# Patient Record
Sex: FEMALE | Race: BLACK OR AFRICAN AMERICAN | Employment: OTHER | URBAN - METROPOLITAN AREA
[De-identification: names, ages, dates, MRNs, and addresses within clinical notes are randomized per-mention and may not be internally consistent; named-entity substitution may affect disease eponyms.]

---

## 2017-07-18 ENCOUNTER — OFFICE VISIT (OUTPATIENT)
Dept: INTERNAL MEDICINE CLINIC | Age: 67
End: 2017-07-18

## 2017-07-18 VITALS
HEART RATE: 67 BPM | DIASTOLIC BLOOD PRESSURE: 92 MMHG | SYSTOLIC BLOOD PRESSURE: 163 MMHG | HEIGHT: 63 IN | BODY MASS INDEX: 20.94 KG/M2 | RESPIRATION RATE: 20 BRPM | OXYGEN SATURATION: 94 % | TEMPERATURE: 97.9 F | WEIGHT: 118.2 LBS

## 2017-07-18 DIAGNOSIS — Z12.11 ENCOUNTER FOR SCREENING COLONOSCOPY: ICD-10-CM

## 2017-07-18 DIAGNOSIS — E78.5 DYSLIPIDEMIA: ICD-10-CM

## 2017-07-18 DIAGNOSIS — R06.09 DYSPNEA ON EXERTION: Primary | ICD-10-CM

## 2017-07-18 DIAGNOSIS — I10 WHITE COAT SYNDROME WITH HYPERTENSION: ICD-10-CM

## 2017-07-18 DIAGNOSIS — Z12.31 ENCOUNTER FOR SCREENING MAMMOGRAM FOR HIGH-RISK PATIENT: ICD-10-CM

## 2017-07-18 DIAGNOSIS — Z00.00 WELLNESS EXAMINATION: ICD-10-CM

## 2017-07-18 NOTE — MR AVS SNAPSHOT
Visit Information Date & Time Provider Department Dept. Phone Encounter #  
 7/18/2017 10:30 AM Pawan Batista MD [facility] [phone] [id number] Follow-up Instructions Return in about 6 months (around 1/18/2018). Upcoming Health Maintenance Date Due Hepatitis C Screening 1950 BREAST CANCER SCRN MAMMOGRAM 1/20/2001 FOBT Q 1 YEAR AGE 50-75 1/20/2001 GLAUCOMA SCREENING Q2Y 1/20/2016 MEDICARE YEARLY EXAM 7/12/2017 INFLUENZA AGE 9 TO ADULT 8/1/2017 Pneumococcal 65+ Low/Medium Risk (2 of 2 - PPSV23) 7/18/2018 DTaP/Tdap/Td series (2 - Td) 7/18/2027 Allergies as of 7/18/2017  Review Complete On: 7/18/2017 By: Shazia Contreras No Known Allergies Current Immunizations  Never Reviewed No immunizations on file. Not reviewed this visit You Were Diagnosed With   
  
 Codes Comments Dyspnea on exertion    -  Primary ICD-10-CM: R06.09 
ICD-9-CM: 786.09 Dyslipidemia     ICD-10-CM: E78.5 ICD-9-CM: 272.4 White coat syndrome with hypertension     ICD-10-CM: I10 
ICD-9-CM: 401.9 Vitals BP Pulse Temp Resp Height(growth percentile) Weight(growth percentile) (!) 163/92 (BP 1 Location: Left arm, BP Patient Position: Sitting) 67 97.9 °F (36.6 °C) (Oral) 20 5' 3\" (1.6 m) 118 lb 3.2 oz (53.6 kg) SpO2 BMI OB Status Smoking Status 94% 20.94 kg/m2 Hysterectomy Never Smoker Vitals History BMI and BSA Data Body Mass Index Body Surface Area  
 20.94 kg/m 2 1.54 m 2 Preferred Pharmacy Pharmacy Name Phone [facility] [address] [place] [address] [phone] Your Updated Medication List  
  
Notice  As of 7/18/2017 12:40 PM  
 You have not been prescribed any medications. We Performed the Following AMB POC EKG ROUTINE W/ 12 LEADS, INTER & REP [76629 CPT(R)] APOLIPOPROTEIN B [id number] CPT(R)] CBC WITH AUTOMATED DIFF [52163 CPT(R)] LIPID PANEL [54424 CPT(R)] METABOLIC PANEL, COMPREHENSIVE [32372 CPT(R)] MO COLLECTION VENOUS BLOOD,VENIPUNCTURE [id number] CPT(R)] TSH 3RD GENERATION [88983 CPT(R)] URINALYSIS W/ RFLX MICROSCOPIC [41231 CPT(R)] Follow-up Instructions Return in about 6 months (around 1/18/2018). Introducing [facility] & HEALTH SERVICES! Jumana Machado introduces [employer] patient portal. Now you can access parts of your medical record, email your doctor's office, and request medication refills online. 1. In your internet browser, go to [email or web address] 2. Click on the First Time User? Click Here link in the Sign In box. You will see the New Member Sign Up page. 3. Enter your [employer]iance Access Code exactly as it appears below. You will not need to use this code after youve completed the sign-up process. If you do not sign up before the expiration date, you must request a new code. · [employer]iance Access Code: [email or web address] Expires: 10/16/2017 11:09 AM 
 
4. Enter the last four digits of your Social Security Number (xxxx) and Date of Birth (mm/dd/yyyy) as indicated and click Submit. You will be taken to the next sign-up page. 5. Create a Enviance ID. This will be your Enviance login ID and cannot be changed, so think of one that is secure and easy to remember. 6. Create a Enviance password. You can change your password at any time. 7. Enter your Password Reset Question and Answer. This can be used at a later time if you forget your password. 8. Enter your e-mail address. You will receive e-mail notification when new information is available in [address] 9. Click Sign Up. You can now view and download portions of your medical record. 10. Click the Download Summary menu link to download a portable copy of your medical information.  
 
If you have questions, please visit the Frequently Asked Questions section of the [employer]. Remember, [employer] is NOT to be used for urgent needs. For medical emergencies, dial 911. Now available from your iPhone and Android! Please provide this summary of care documentation to your next provider. Your primary care clinician is listed as Adriana Bonds. If you have any questions after today's visit, please call [phone].

## 2017-07-18 NOTE — PROGRESS NOTES
Chief Complaint   Patient presents with   Vilma Coffman Annual Wellness Visit     1. Have you been to the ER, urgent care clinic since your last visit? Hospitalized since your last visit? No    2. Have you seen or consulted any other health care providers outside of the [address] [place] [address] since your last visit? Include any pap smears or colon screening.  No

## 2017-07-18 NOTE — PROGRESS NOTES
Chief Complaint   Patient presents with   [facility] Wellness Visit   . SUBECTIVE:    Castillo Barakat is a 78 y.o. female comes in for return visit stating that he has done well. Her chief complaint is that of dyspnea on exertion. She denies any chest pain. She has had this for before but it has gotten a bit worse. It does not interfere with her ability to function. She also has a history of white coat hypertension. Colonoscopy was done last year and she had some dysplasia on one of her polyps that was removed. Therefore she needs another colonoscopy this year.             Past Medical History:   Diagnosis Date    Pneumonia 04/01/2010     Past Surgical History:   Procedure Laterality Date    COLONOSCOPY N/A 5/23/2016    COLONOSCOPY performed [facility] Lexx Valencia [facility] at [facility] END[facility]COPY    HX GI      colonscopy - about 2011    HX GYN      hysterectomy    HX ORTHOPAEDIC      surgery for broken foot - right     No Known Allergies    REVIEW OF SYSTEMS:  Review of Systems - Negative except   ENT ROS: negative for - headaches, hearing change, nasal congestion, oral lesions, tinnitus, visual changes or   Respiratory ROS: no cough, shortness of breath, or wheezing  Cardiovascular ROS: no chest pain or dyspnea on exertion  Gastrointestinal ROS: no abdominal pain, change in bowel habits, or black or blood  Genito-Urinary ROS: no dysuria, trouble voiding, or hematuria  Musculoskeletal ROS: negative  Neurological ROS: no TIA or stroke symptoms      Social History     Social History    Marital status: LEGALLY      Spouse name: N/A    Number of children: 3    Years of education: N/A     Occupational History    retired cna      Social History Main Topics    Smoking status: Never Smoker    Smokeless tobacco: Never Used    Alcohol use No    Drug use: No    Sexual activity: Not Asked     Other Topics Concern    None     Social History Narrative   r  Family History   Problem Relation Age of Onset  Cancer Sister 39     colon ca       OBJECTIVE:  Visit Vitals    BP (!) 163/92 (BP 1 Location: Left arm, BP Patient Position: Sitting)    Pulse 67    Temp 97.9 °F (36.6 °C) (Oral)    Resp 20    Ht 5' 3\" (1.6 m)    Wt 118 lb 3.2 oz (53.6 kg)    SpO2 94%    BMI 20.94 kg/m2     ENT: perrla,  eom intact  NECK: supple. Thyroid normal, no JVD  CHEST: clear to ascultation and percussion   HEART: regular rate and rhythm  Breast: No masses  ABD: soft, bowel sounds active,   EXTREMITIES: no edema, pulse 1+  INTEGUMENT: clear      ASSESSMENT:  1. Dyspnea on exertion    2. Dyslipidemia    3. White coat syndrome with hypertension    4. Encounter for screening colonoscopy    5. Encounter for screening mammogram for high-risk patient    6. Wellness examination        PLAN:    1. I am not entirely sure of the origin of the patient's dyspnea on exertion. I will await the results of her lab work first.  If this is entirely negative, specifically no anemia an echocardiogram and chest x-ray will be obtained. 2. Her blood pressure is 140/70. I think she has white coat hypertension but I suspect she will eventually develop sustained hypertension. 3. Mammograms will be scheduled. .  Orders Placed This Encounter    NATALIE MAMMO BI SCREENING INCL CAD    APOLIPOPROTEIN B    CBC WITH AUTOMATED DIFF    LIPID PANEL    URINALYSIS W/ RFLX MICROSCOPIC    TSH 3RD GENERATION    METABOLIC PANEL, COMPREHENSIVE    MICROSCOPIC EXAMINATION    REFERRAL TO GASTROENTEROLOGY    AMB POC EKG ROUTINE W/ 12 LEADS, INTER & REP       Follow-up Disposition:  Return in about 6 months (around 1/18/2018). Cliff Meza MD  This is a Subsequent Medicare Annual Wellness Exam (AWV) (Performed 12 months after IPPE or effective date of Medicare Part B enrollment, Once in a lifetime)    I have reviewed the patient's medical history in detail and updated the computerized patient record.      History     Past Medical History:   Diagnosis Date    Pneumonia 04/01/2010      Past Surgical History:   Procedure Laterality Date    COLONOSCOPY N/A 5/23/2016    COL[facility] at [facility]    HX GI      colonscopy - about 2011    HX GYN      hysterectomy    HX ORTHOPAEDIC      surgery for broken foot - right       No Known Allergies  Family History   Problem Relation Age of Onset    Cancer Sister 39     colon ca     Social History   Substance Use Topics    Smoking status: Never Smoker    Smokeless tobacco: Never Used    Alcohol use No     Patient Active Problem List   Diagnosis Code    Physical exam, annual Z00.00    Encounter for screening mammogram for high-risk patient Z12.31    Anemia D64.9    HTN, white coat [id number]       Depression Risk Factor Screening:     PHQ over the last two weeks 7/18/2017   Little interest or pleasure in doing things Not at all   Feeling down, depressed or hopeless Not at all   Total Score PHQ 2 0     Alcohol Risk Factor Screening: On any occasion during the past 3 months, have you had more than 3 drinks containing alcohol? No    Do you average more than 7 drinks per week? No      Functional Ability and Level of Safety:     Hearing Loss  Hearing is good. Activities of Daily Living  The home contains: no safety equipment  Patient does total self care    Fall Risk  Fall Risk Assessment, last 12 mths 7/18/2017   Able to walk? Yes   Fall in past 12 months? No       Abuse Screen  Patient is not abused    Cognitive Screening   Evaluation of Cognitive Function:  Has your family/caregiver stated any concerns about your memory: no  Normal    Patient Care Team   Patient Care Team:  Pb Reece MD as PCP - General (Internal Medicine)    Advice/Referrals/Counseling   Education and counseling provided:  Are appropriate based on today's review and evaluation      Assessment/Plan   Diagnoses and all orders for this visit:    1.  Dyspnea on exertion  -     CBC WITH AUTOMATED DIFF  - URINALYSIS W/ RFLX MICROSCOPIC  -     HI COLLECTION VENOUS BLOOD,VENIPUNCTURE  -     METABOLIC PANEL, COMPREHENSIVE  -     AMB POC EKG ROUTINE W/ 12 LEADS, INTER & REP    2. Dyslipidemia  -     APOLIPOPROTEIN B  -     LIPID PANEL  -     TSH 3RD GENERATION  -     AMB POC EKG ROUTINE W/ 12 LEADS, INTER & REP    3. White coat syndrome with hypertension    4. Encounter for screening colonoscopy  -     REFERRAL TO GASTROENTEROLOGY    5. Encounter for screening mammogram for high-risk patient  -     NATALIE MAMMO BI SCREENING INCL CAD; Future    6.  Wellness examination    Other orders  -     MICROSCOPIC EXAMINATION      Health Maintenance Due   Topic Date Due    Hepatitis C Screening  1950    BREAST CANCER SCRN MAMMOGRAM  01/20/2001    FOBT Q 1 YEAR AGE 50-75  01/20/2001    GLAUCOMA SCREENING Q2Y  01/20/2016    INFLUENZA AGE 9 TO ADULT  08/01/2017

## 2017-07-19 LAB
ALBUMIN SERPL-MCNC: 4.4 G/DL (ref 3.6–4.8)
ALBUMIN/GLOB SERPL: 1.8 {RATIO} (ref 1.2–2.2)
ALP SERPL-CCNC: 59 IU/L (ref 39–117)
ALT SERPL-CCNC: 9 IU/L (ref 0–32)
APO B SERPL-MCNC: 86 MG/DL (ref 54–133)
APPEARANCE UR: ABNORMAL
AST SERPL-CCNC: 13 IU/L (ref 0–40)
BACTERIA #/AREA URNS HPF: NORMAL /[HPF]
BASOPHILS # BLD AUTO: 0 X10E3/UL (ref 0–0.2)
BASOPHILS NFR BLD AUTO: 1 %
BILIRUB SERPL-MCNC: 0.3 MG/DL (ref 0–1.2)
BILIRUB UR QL STRIP: NEGATIVE
BUN SERPL-MCNC: 20 MG/DL (ref 8–27)
BUN/CREAT SERPL: 20 (ref 12–28)
CALCIUM SERPL-MCNC: 9.9 MG/DL (ref 8.7–10.3)
CASTS URNS QL MICRO: NORMAL /LPF
CHLORIDE SERPL-SCNC: 103 MMOL/L (ref 96–106)
CHOLEST SERPL-MCNC: 187 MG/DL (ref 100–199)
CO2 SERPL-SCNC: 24 MMOL/L (ref 18–29)
COLOR UR: YELLOW
CREAT SERPL-MCNC: 1 MG/DL (ref 0.57–1)
EOSINOPHIL # BLD AUTO: 0.1 X10E3/UL (ref 0–0.4)
EOSINOPHIL NFR BLD AUTO: 3 %
EPI CELLS #/AREA URNS HPF: NORMAL /HPF
ERYTHROCYTE [DISTWIDTH] IN BLOOD BY AUTOMATED COUNT: 12.8 % (ref 12.3–15.4)
GLOBULIN SER CALC-MCNC: 2.5 G/DL (ref 1.5–4.5)
GLUCOSE SERPL-MCNC: 89 MG/DL (ref 65–99)
GLUCOSE UR QL: NEGATIVE
HCT VFR BLD AUTO: 34.9 % (ref 34–46.6)
HDLC SERPL-MCNC: 69 MG/DL
HGB BLD-MCNC: 11 G/DL (ref 11.1–15.9)
HGB UR QL STRIP: NEGATIVE
IMM GRANULOCYTES # BLD: 0 X10E3/UL (ref 0–0.1)
IMM GRANULOCYTES NFR BLD: 0 %
KETONES UR QL STRIP: NEGATIVE
LDLC SERPL CALC-MCNC: 110 MG/DL (ref 0–99)
LEUKOCYTE ESTERASE UR QL STRIP: ABNORMAL
LYMPHOCYTES # BLD AUTO: 1.7 X10E3/UL (ref 0.7–3.1)
LYMPHOCYTES NFR BLD AUTO: 39 %
MCH RBC QN AUTO: 30.7 PG (ref 26.6–33)
MCHC RBC AUTO-ENTMCNC: 31.5 G/DL (ref 31.5–35.7)
MCV RBC AUTO: 98 FL (ref 79–97)
MICRO URNS: ABNORMAL
MONOCYTES # BLD AUTO: 0.3 X10E3/UL (ref 0.1–0.9)
MONOCYTES NFR BLD AUTO: 7 %
MUCOUS THREADS URNS QL MICRO: PRESENT
NEUTROPHILS # BLD AUTO: 2.2 X10E3/UL (ref 1.4–7)
NEUTROPHILS NFR BLD AUTO: 50 %
NITRITE UR QL STRIP: NEGATIVE
PH UR STRIP: 6 [PH] (ref 5–7.5)
PLATELET # BLD AUTO: 244 X10E3/UL (ref 150–379)
POTASSIUM SERPL-SCNC: 4.3 MMOL/L (ref 3.5–5.2)
PROT SERPL-MCNC: 6.9 G/DL (ref 6–8.5)
PROT UR QL STRIP: NEGATIVE
RBC # BLD AUTO: 3.58 X10E6/UL (ref 3.77–5.28)
RBC #/AREA URNS HPF: NORMAL /HPF
SODIUM SERPL-SCNC: 144 MMOL/L (ref 134–144)
SP GR UR: 1.01 (ref 1–1.03)
TRIGL SERPL-MCNC: 41 MG/DL (ref 0–149)
TSH SERPL DL<=0.005 MIU/L-ACNC: 1.59 UIU/ML (ref 0.45–4.5)
UROBILINOGEN UR STRIP-MCNC: 0.2 MG/DL (ref 0.2–1)
VLDLC SERPL CALC-MCNC: 8 MG/DL (ref 5–40)
WBC # BLD AUTO: 4.4 X10E3/UL (ref 3.4–10.8)
WBC #/AREA URNS HPF: NORMAL /HPF

## 2017-08-27 NOTE — PATIENT INSTRUCTIONS

## 2017-08-31 ENCOUNTER — HOSPITAL ENCOUNTER (OUTPATIENT)
Dept: MAMMOGRAPHY | Age: 67
Discharge: HOME | End: 2017-08-31
Attending: INTERNAL MEDICINE
Payer: MEDICARE

## 2017-08-31 DIAGNOSIS — Z12.31 ENCOUNTER FOR SCREENING MAMMOGRAM FOR HIGH-RISK PATIENT: ICD-10-CM

## 2017-08-31 PROCEDURE — 77067 SCR MAMMO BI INCL CAD: CPT

## 2017-09-04 DIAGNOSIS — R06.09 DYSPNEA ON EXERTION: Primary | ICD-10-CM

## 2017-09-08 ENCOUNTER — TELEPHONE (OUTPATIENT)
Dept: INTERNAL MEDICINE CLINIC | Age: 67
End: 2017-09-08

## 2017-09-12 ENCOUNTER — HOSPITAL ENCOUNTER (OUTPATIENT)
Dept: ULTRASOUND IMAGING | Age: 67
Discharge: HOME | End: 2017-09-12
Attending: INTERNAL MEDICINE
Payer: MEDICARE

## 2017-09-12 ENCOUNTER — HOSPITAL ENCOUNTER (OUTPATIENT)
Dept: MAMMOGRAPHY | Age: 67
Discharge: HOME | End: 2017-09-12
Attending: INTERNAL MEDICINE
Payer: MEDICARE

## 2017-09-12 DIAGNOSIS — R92.8 ABNORMAL MAMMOGRAM OF LEFT BREAST: ICD-10-CM

## 2017-09-12 DIAGNOSIS — R92.8 ABNORMAL MAMMOGRAM OF RIGHT BREAST: ICD-10-CM

## 2017-09-12 PROCEDURE — 77065 DX MAMMO INCL CAD UNI: CPT

## 2017-09-28 ENCOUNTER — HOSPITAL ENCOUNTER (OUTPATIENT)
Dept: NON INVASIVE DIAGNOSTICS | Age: 67
Discharge: HOME | End: 2017-09-28
Attending: INTERNAL MEDICINE
Payer: MEDICARE

## 2017-09-28 DIAGNOSIS — R06.09 DYSPNEA ON EXERTION: ICD-10-CM

## 2017-09-28 PROCEDURE — 93306 TTE W/DOPPLER COMPLETE: CPT

## 2017-11-13 NOTE — TELEPHONE ENCOUNTER
Patient called stating that she is constantly coughing. Per Dr. Cheyenne Romero patient treated with Prednisone 20mg 1 tid x 7 days.

## 2017-11-30 ENCOUNTER — HOSPITAL ENCOUNTER (EMERGENCY)
Age: 67
Discharge: HOME | End: 2017-11-30
Attending: EMERGENCY MEDICINE
Payer: MEDICARE

## 2017-11-30 ENCOUNTER — APPOINTMENT (OUTPATIENT)
Dept: GENERAL RADIOLOGY | Age: 67
End: 2017-11-30
Attending: EMERGENCY MEDICINE
Payer: MEDICARE

## 2017-11-30 VITALS
TEMPERATURE: 98.5 F | RESPIRATION RATE: 16 BRPM | SYSTOLIC BLOOD PRESSURE: 150 MMHG | DIASTOLIC BLOOD PRESSURE: 120 MMHG | HEART RATE: 77 BPM | WEIGHT: 114.64 LBS | HEIGHT: 63 IN | BODY MASS INDEX: 20.31 KG/M2 | OXYGEN SATURATION: 98 %

## 2017-11-30 DIAGNOSIS — J20.9 ACUTE BRONCHITIS, UNSPECIFIED ORGANISM: ICD-10-CM

## 2017-11-30 DIAGNOSIS — R09.89 HYPERINFLATION OF LUNGS: ICD-10-CM

## 2017-11-30 DIAGNOSIS — R05.9 COUGH: Primary | ICD-10-CM

## 2017-11-30 PROCEDURE — 71020 XR CHEST PA LAT: CPT

## 2017-11-30 PROCEDURE — 99283 EMERGENCY DEPT VISIT LOW MDM: CPT

## 2017-11-30 PROCEDURE — 74011000250 HC RX REV CODE- 250: Performed by: EMERGENCY MEDICINE

## 2017-11-30 PROCEDURE — 94640 AIRWAY INHALATION TREATMENT: CPT

## 2017-11-30 PROCEDURE — 77030029684 HC NEB SM VOL KT MONA -A

## 2017-11-30 PROCEDURE — 74011250637 HC RX REV CODE- 250/637: Performed by: EMERGENCY MEDICINE

## 2017-11-30 RX ORDER — BENZONATATE 100 MG/1
100 CAPSULE ORAL
Qty: 21 CAP | Refills: 0 | Status: SHIPPED | OUTPATIENT
Start: 2017-11-30 | End: 2017-12-07

## 2017-11-30 RX ORDER — ALBUTEROL SULFATE 90 UG/1
2 AEROSOL, METERED RESPIRATORY (INHALATION)
Qty: 1 INHALER | Refills: 1 | Status: SHIPPED | OUTPATIENT
Start: 2017-11-30 | End: 2020-04-08

## 2017-11-30 RX ORDER — IPRATROPIUM BROMIDE AND ALBUTEROL SULFATE 2.5; .5 MG/3ML; MG/3ML
3 SOLUTION RESPIRATORY (INHALATION)
Status: COMPLETED | OUTPATIENT
Start: 2017-11-30 | End: 2017-11-30

## 2017-11-30 RX ORDER — AZITHROMYCIN 250 MG/1
250 TABLET, FILM COATED ORAL DAILY
Qty: 4 TAB | Refills: 0 | Status: SHIPPED | OUTPATIENT
Start: 2017-12-01 | End: 2017-12-05

## 2017-11-30 RX ORDER — AZITHROMYCIN 250 MG/1
500 TABLET, FILM COATED ORAL
Status: COMPLETED | OUTPATIENT
Start: 2017-11-30 | End: 2017-11-30

## 2017-11-30 RX ADMIN — IPRATROPIUM BROMIDE AND ALBUTEROL SULFATE 3 ML: .5; 3 SOLUTION RESPIRATORY (INHALATION) at 13:46

## 2017-11-30 RX ADMIN — AZITHROMYCIN 500 MG: 250 TABLET, FILM COATED ORAL at 13:45

## 2017-11-30 NOTE — ED NOTES
Complaint of cough for several months. She initially had a cold, which resolved, but the cough did not. She was placed on steroids and mucinex. Since then, the cold sx have returned and the cough is persistent.

## 2017-11-30 NOTE — ED PROVIDER NOTES
EMERGENCY DEPARTMENT HISTORY AND PHYSICAL EXAM      Date: 11/30/2017  Patient Name: Nino Leonard    History of Presenting Illness     Chief Complaint   Patient presents with    Cough     pt reported she has had a cough for 2-3 months. Pts PCP called in prescription for prednisone that patient reported didn't help. History Provided By: Patient    HPI: Nino Leonard, 78 y.o. female, presents ambulatory to the ED with cc of a persistent productive cough with thick yellow sputum x 3 months. She also c/o associated chest tightness. She states that she was evaluated by her PCP for this cough, and she was prescribed Prednisone TID with no relief. The patient also reports that she has taken Mucinex and Robitussin with no relief. She states that she has a history of PNA. She denies a history of COPD, asthma, tobacco use, or heart failure. She also denies taking Lisinopril. She specifically denies fevers, chest pain, leg swelling, or other complaints at this time. There are no other complaints, changes, or physical findings at this time. PCP: Court Vargas MD        Past History     Past Medical History:  Past Medical History:   Diagnosis Date    Pneumonia 04/01/2010       Past Surgical History:  Past Surgical History:   Procedure Laterality Date    COLONOSCOPY N/A 5/23/2016    COLONOSCOPY performed by Ashly Rodriguez MD at [address] [place] [address] ENDOSCOPY    HX BREAST BIOPSY Right     Benign. Patient thinks biopsy was on rt breast lower areolar area. Along time ago.     HX GI      colonscopy - about 2011    HX GYN      hysterectomy    HX HYSTERECTOMY      HX ORTHOPAEDIC      surgery for broken foot - right       Family History:  Family History   Problem Relation Age of Onset    Cancer Sister 39     colon ca       Social History:  Social History   Substance Use Topics    Smoking status: Never Smoker    Smokeless tobacco: Never Used    Alcohol use No       Allergies:  No Known Allergies      Review of Systems Review of Systems   Constitutional: Negative for chills and fever. Respiratory: Positive for cough and chest tightness. Negative for shortness of breath. Cardiovascular: Negative for chest pain and leg swelling. Gastrointestinal: Negative for constipation, diarrhea, nausea and vomiting. Neurological: Negative for weakness and numbness. All other systems reviewed and are negative. Physical Exam   Physical Exam   Constitutional: She is oriented to person, place, and time. Thin. HENT:   Head: Normocephalic and atraumatic. Eyes: Conjunctivae and EOM are normal.   Neck: Normal range of motion. Neck supple. Cardiovascular: Normal rate and regular rhythm. Pulmonary/Chest: Effort normal. No respiratory distress. Poor air movement throughout, especially with expiration. Abdominal: Soft. She exhibits no distension. There is no tenderness. Musculoskeletal: Normal range of motion. No pitting edema. Neurological: She is alert and oriented to person, place, and time. Skin: Skin is warm and dry. Psychiatric: She has a normal mood and affect. Nursing note and vitals reviewed. Diagnostic Study Results     Radiologic Studies -   CXR Results  (Last 48 hours)               11/30/17 1324  XR CHEST PA LAT Final result    Impression:  IMPRESSION: COPD. No airspace disease or acute abnormality and no change. Narrative:  EXAM:  XR CHEST PA LAT. INDICATION: Cough. COMPARISON: 1/29/2013. FINDINGS:    PA and lateral radiographs of the chest were obtained. Lungs: The lungs are hyperinflated and clear of mass, nodule, airspace disease   or edema. Pleura: There is no pleural effusion or pneumothorax. Mediastinum: The cardiac and mediastinal contours and pulmonary vascularity are   normal.   Bones and soft tissues: There are degenerative changes of the spine. Left upper   quadrant calcifications probably represent splenic granulomas and are unchanged. Medical Decision Making   I am the first provider for this patient. I reviewed the vital signs, available nursing notes, past medical history, past surgical history, family history and social history. Vital Signs-Reviewed the patient's vital signs. Patient Vitals for the past 12 hrs:   Temp Pulse Resp BP SpO2   11/30/17 1232 98.5 °F (36.9 °C) 77 16 (!) 150/120 98 %       Records Reviewed: Nursing Notes and Old Medical Records    Provider Notes (Medical Decision Making): The patient complains of nasal congestion, rhinorrhea, and cough. DDx: viral URI, acute bronchitis, bacterial sinusitis vs. pharyngitis, migraine, flu. Possibly COPD/asthma. Will get CXR to r/o PNA and treat symptoms. ED Course:   Initial assessment performed. The patients presenting problems have been discussed, and they are in agreement with the care plan formulated and outlined with them. I have encouraged them to ask questions as they arise throughout their visit. Progress Note:  1:55 PM  The patient was re-evaluated, and she states that she is feeling better at this time. The patient was informed of cxray results, and she conveys her understanding of these results. Pt was recommended to follow up with Pulmonology. Written by Arron Shaw ED Scribbailey, as dictated by Naomi Reyna MD.    Critical Care Time: 0 minutes    Discharge Note:  1:47 PM  The pt is ready for discharge. The pt's signs, symptoms, diagnosis, and discharge instructions have been discussed and pt has conveyed their understanding. The pt is to follow up as recommended or return to ER should their symptoms worsen. Plan has been discussed and pt is in agreement. PLAN:  1. Current Discharge Medication List      START taking these medications    Details   albuterol (PROVENTIL HFA, VENTOLIN HFA, PROAIR HFA) 90 mcg/actuation inhaler Take 2 Puffs by inhalation every four (4) hours as needed for Wheezing.   Qty: 1 Inhaler, Refills: 1      azithromycin (ZITHROMAX) 250 mg tablet Take 1 Tab by mouth daily for 4 days. Qty: 4 Tab, Refills: 0      benzonatate (TESSALON PERLES) 100 mg capsule Take 1 Cap by mouth three (3) times daily as needed for Cough for up to 7 days. Qty: 21 Cap, Refills: 0           2. Follow-up Information     Follow up With Details Comments Contact Stephany Schultz MD  About seeing a Pulmonologist [address] [address]  [address]  [facility] 7 [facility]      Mango Albert MD Schedule an appointment as soon as possible for a visit  [facility]  [facility]  Shade Romero [address] [place] [address]  [phone]          Return to ED if worse     Diagnosis     Clinical Impression:   1. Cough    2. Acute bronchitis, unspecified organism    3. Hyperinflation of lungs        Attestations: This note is prepared by Paula Meyer, acting as a Scribe for Steve Daniel MD.    Steve Daniel MD: The scribe's documentation has been prepared under my direction and personally reviewed by me in its entirety. I confirm that the notes above accurately reflects all work, treatment, procedures, and medical decision making performed by me. This note will not be viewable in [address]

## 2017-11-30 NOTE — DISCHARGE INSTRUCTIONS

## 2017-11-30 NOTE — ED NOTES
Dr Concepcion Mcnally reviewed discharge instructions with the patient. The patient verbalized understanding. All questions and concerns were addressed. The patient declined a wheelchair and is discharged ambulatory in the care of family members with instructions and prescriptions in hand. Pt is alert and oriented x 4. Respirations are clear and unlabored.

## 2017-12-21 ENCOUNTER — OFFICE VISIT (OUTPATIENT)
Dept: INTERNAL MEDICINE CLINIC | Age: 67
End: 2017-12-21

## 2017-12-21 VITALS
BODY MASS INDEX: 20.55 KG/M2 | HEIGHT: 63 IN | HEART RATE: 81 BPM | RESPIRATION RATE: 16 BRPM | TEMPERATURE: 98.5 F | OXYGEN SATURATION: 95 % | DIASTOLIC BLOOD PRESSURE: 74 MMHG | WEIGHT: 116 LBS | SYSTOLIC BLOOD PRESSURE: 127 MMHG

## 2017-12-21 DIAGNOSIS — R06.09 DYSPNEA ON EXERTION: ICD-10-CM

## 2017-12-21 DIAGNOSIS — J45.20 MILD INTERMITTENT REACTIVE AIRWAY DISEASE WITHOUT COMPLICATION: Primary | ICD-10-CM

## 2017-12-21 NOTE — PROGRESS NOTES
Chief Complaint   Patient presents with   [facility] ED Follow-up     cough on 11/30/17     1. Have you been to the ER, urgent care clinic since your last visit? Hospitalized since your last visit? Yes Reason for visit: cough on 11/30/17    2. Have you seen or consulted any other health care providers outside of the [address] [place] [address] since your last visit? Include any pap smears or colon screening.  No

## 2017-12-21 NOTE — MR AVS SNAPSHOT
Visit Information Date & Time Provider Department Dept. Phone Encounter #  
 12/21/2017 12:30 PM [facility] [phone] Your Appointments 1/18/2018  9:15 AM  
Any with Viky Wolf MD  
[address] [facility] [address] [facility]) Appt Note: 6 month f/u  
 [address] [facility]  
  
   
 [address] [id number] Upcoming Health Maintenance Date Due Hepatitis C Screening 1950 FOBT Q 1 YEAR AGE 50-75 1/20/2001 GLAUCOMA SCREENING Q2Y 1/20/2016 Influenza Age 5 to Adult 8/1/2017 Pneumococcal 65+ Low/Medium Risk (2 of 2 - PPSV23) 7/18/2018 MEDICARE YEARLY EXAM 7/19/2018 DTaP/Tdap/Td series (2 - Td) 7/18/2027 Allergies as of 12/21/2017  Review Complete On: 12/21/2017 By: Tracey Morrison No Known Allergies Current Immunizations  Reviewed on 11/30/2017 No immunizations on file. Not reviewed this visit Vitals BP Pulse Temp Resp Height(growth percentile) Weight(growth percentile) 127/74 (BP 1 Location: Right arm, BP Patient Position: Sitting) 81 98.5 °F (36.9 °C) (Oral) 16 5' 3\" (1.6 m) 116 lb (52.6 kg) SpO2 BMI OB Status Smoking Status 95% 20.55 kg/m2 Hysterectomy Never Smoker Vitals History BMI and BSA Data Body Mass Index Body Surface Area 20.55 kg/m 2 1.53 m 2 Preferred Pharmacy Pharmacy Name Phone [facility] [address] [address] [employer] [facility] [phone] Your Updated Medication List  
  
   
This list is accurate as of: 12/21/17 12:49 PM.  Always use your most recent med list.  
  
  
  
  
 albuterol 90 mcg/actuation inhaler Commonly known as:  PROVENTIL HFA, VENTOLIN HFA, PROAIR HFA Take 2 Puffs by inhalation every four (4) hours as needed for Wheezing. Introducing \[facility]\"" & HEALTH SERVICES! Janice Morgan introduces [employer] patient portal. Now you can access parts of your medical record, email your doctor's office, and request medication refills online. 1. In your internet browser, go to [email or web address] 2. Click on the First Time User? Click Here link in the Sign In box. You will see the New Member Sign Up page. 3. Enter your [employer] Access Code exactly as it appears below. You will not need to use this code after youve completed the sign-up process. If you do not sign up before the expiration date, you must request a new code. · [employer] Access Code: [email or web address] Expires: 1/13/2018  1:09 PM 
 
4. Enter the last four digits of your Social Security Number (xxxx) and Date of Birth (mm/dd/yyyy) as indicated and click Submit. You will be taken to the next sign-up page. 5. Create a [employer] ID. This will be your [employer] login ID and cannot be changed, so think of one that is secure and easy to remember. 6. Create a [employer] password. You can change your password at any time. 7. Enter your Password Reset Question and Answer. This can be used at a later time if you forget your password. 8. Enter your e-mail address. You will receive e-mail notification when new information is available in [address] 9. Click Sign Up. You can now view and download portions of your medical record. 10. Click the Download Summary menu link to download a portable copy of your medical information. If you have questions, please visit the Frequently Asked Questions section of the [employer] website. Remember, [employer] is NOT to be used for urgent needs. For medical emergencies, dial 911. Now available from your iPhone and Android! Please provide this summary of care documentation to your next provider. Your primary care clinician is listed as Adriana Bonds. If you have any questions after today's visit, please call [phone].

## 2017-12-21 NOTE — PROGRESS NOTES
Chief Complaint   Patient presents with   [facility] ED Follow-up     cough on 11/30/17   . SUBECTIVE:    Yaya Jaramillo is a 78 y.o. female   Comes in for a return visit stating that she has had a history of recurrent coughing. This started in September. She never followed up with me. She was given steroids, only took it for two days and did not get any improvement. Therefore, she stopped the medication. The cough continued intermittently and apparently worsened a week or so ago. The cough became productive of yellowish to greenish mucus. She was given an antibiotic and Albuterol inhaler as well as a cough suppressant. The cough is somewhat better currently. She has had a history of chronic dyspnea but to date no obvious etiology has been found. She formerly smoked cigarettes for 30+ years ago. She has never worked in a polluted environment either. MedDATA/gwo         Current Outpatient Prescriptions   Medication Sig Dispense Refill    albuterol (PROVENTIL HFA, VENTOLIN HFA, PROAIR HFA) 90 mcg/actuation inhaler Take 2 Puffs by inhalation every four (4) hours as needed for Wheezing. 1 Inhaler 1     Past Medical History:   Diagnosis Date    Pneumonia 04/01/2010     Past Surgical History:   Procedure Laterality Date    COLONOSCOPY N/A 5/23/2016    COLONOSCOPY performed by Lisa Toth MD at [address] HX BREAST BIOPSY Right     Benign. Patient thinks biopsy was on rt breast lower areolar area. Along time ago.     HX GI      colonscopy - about 2011    HX GYN      hysterectomy    HX HYSTERECTOMY      HX ORTHOPAEDIC      surgery for broken foot - right     No Known Allergies    REVIEW OF SYSTEMS:  Review of Systems - Negative except   ENT ROS: negative for - headaches, hearing change, nasal congestion, oral lesions, tinnitus, visual changes or   Respiratory ROS: no cough, shortness of breath, or wheezing  Cardiovascular ROS: no chest pain or dyspnea on exertion  Gastrointestinal ROS: no abdominal pain, change in bowel habits, or black or blood  Genito-Urinary ROS: no dysuria, trouble voiding, or hematuria  Musculoskeletal ROS: negative  Neurological ROS: no TIA or stroke symptoms      Social History     Social History    Marital status: LEGALLY      Spouse name: N/A    Number of children: 3    Years of education: N/A     Occupational History    retired cna      Social History Main Topics    Smoking status: Never Smoker    Smokeless tobacco: Never Used    Alcohol use No    Drug use: No    Sexual activity: Not Asked     Other Topics Concern    None     Social History Narrative   r  Family History   Problem Relation Age of Onset    Cancer Sister 39     colon ca       OBJECTIVE:  Visit Vitals    /74 (BP 1 Location: Right arm, BP Patient Position: Sitting)    Pulse 81    Temp 98.5 °F (36.9 °C) (Oral)    Resp 16    Ht 5' 3\" (1.6 m)    Wt 116 lb (52.6 kg)    SpO2 95%    BMI 20.55 kg/m2     ENT: perrla,  eom intact  NECK: supple. Thyroid normal, no JVD  CHEST: Sparse fine rhonchi to forced expiration  HEART: regular rate and rhythm  ABD: soft, bowel sounds active,   EXTREMITIES: no edema, pulse 1+  INTEGUMENT: clear      ASSESSMENT:  1. Mild intermittent reactive airway disease without complication    2. Dyspnea on exertion        PLAN:    1. The patient has mild reactive airway disease. She will use Dulera two puffs bid which will be taken on a regular basis. She will not use the rescue inhaler she was given because she is not having any true episodic flares. I will see how she fares when she returns in three weeks. Her chest x-ray done in the emergency department was negative. 2. As far as the dyspnea on exertion, I do not find any obvious etiology. I am not entirely sure if we need to be a bit more aggressive. Her echocardiogram was negative.  I would prefer her exercising on a consistent basis first and if this persists and she is not able to engage in activities that she should be able to theoretically engage in, then workup will be intensified. MedDATA/gwo         Follow-up Disposition:  Return in about 3 weeks (around 1/11/2018).       Fernando Vasquez MD

## 2018-01-11 ENCOUNTER — OFFICE VISIT (OUTPATIENT)
Dept: INTERNAL MEDICINE CLINIC | Age: 68
End: 2018-01-11

## 2018-01-11 VITALS
HEART RATE: 88 BPM | TEMPERATURE: 99 F | SYSTOLIC BLOOD PRESSURE: 120 MMHG | RESPIRATION RATE: 16 BRPM | OXYGEN SATURATION: 96 % | DIASTOLIC BLOOD PRESSURE: 65 MMHG | WEIGHT: 117 LBS | BODY MASS INDEX: 20.73 KG/M2 | HEIGHT: 63 IN

## 2018-01-11 DIAGNOSIS — J06.9 UPPER RESPIRATORY TRACT INFECTION, UNSPECIFIED TYPE: ICD-10-CM

## 2018-01-11 DIAGNOSIS — J45.909 MILD REACTIVE AIRWAYS DISEASE, UNSPECIFIED WHETHER PERSISTENT: Primary | ICD-10-CM

## 2018-01-11 NOTE — MR AVS SNAPSHOT
Visit Information Date & Time Provider Department Dept. Phone Encounter #  
 1/11/2018  3:30 PM Oscar Canavan, [address] Sports Medicine and Primary Care [phone] [id number] Your Appointments 1/11/2018  3:30 PM  
Any with Oscar Canavan, MD  
[address] [facility]) Appt Note: 3 week f/u  
 [phone]  
  
   
 [address] [phone] Upcoming Health Maintenance Date Due Hepatitis C Screening 1950 FOBT Q 1 YEAR AGE 50-75 1/20/2001 GLAUCOMA SCREENING Q2Y 1/20/2016 Influenza Age 5 to Adult 8/1/2017 Pneumococcal 65+ Low/Medium Risk (2 of 2 - PPSV23) 7/18/2018 MEDICARE YEARLY EXAM 7/19/2018 BREAST CANCER SCRN MAMMOGRAM 9/12/2019 DTaP/Tdap/Td series (2 - Td) 7/18/2027 Allergies as of 1/11/2018  Review Complete On: 1/11/2018 By: Natasha Schuster No Known Allergies Current Immunizations  Reviewed on 11/30/2017 No immunizations on file. Not reviewed this visit You Were Diagnosed With   
  
 Codes Comments Mild reactive airways disease, unspecified whether persistent    -  Primary ICD-10-CM: J45.909 ICD-9-CM: 493.90 Vitals BP Pulse Temp Resp Height(growth percentile) Weight(growth percentile) 120/65 (BP 1 Location: Right arm, BP Patient Position: Sitting) 88 99 °F (37.2 °C) (Oral) 16 5' 3\" (1.6 m) 117 lb (53.1 kg) SpO2 BMI OB Status Smoking Status 96% 20.73 kg/m2 Hysterectomy Never Smoker BMI and BSA Data Body Mass Index Body Surface Area 20.73 kg/m 2 1.54 m 2 Preferred Pharmacy Pharmacy Name Phone [facility] [address], [address] [place] [address] [phone] Your Updated Medication List  
  
   
This list is accurate as of: 1/11/18  3:14 PM.  Always use your most recent med list.  
  
  
  
  
 albuterol 90 mcg/actuation inhaler Commonly known as:  PROVENTIL HFA, VENTOLIN HFA, PROAIR HFA Take 2 Puffs by inhalation every four (4) hours as needed for Wheezing. Introducing [facility]! [facility] introduces [employer] patient portal. Now you can access parts of your medical record, email your doctor's office, and request medication refills online. 1. In your internet browser, go to [email or web address] 2. Click on the First Time User? Click Here link in the Sign In box. You will see the New Member Sign Up page. 3. Enter your [employer] Access Code exactly as it appears below. You will not need to use this code after youve completed the sign-up process. If you do not sign up before the expiration date, you must request a new code. · [employer] Access Code: [email or web address] Expires: 1/13/2018  1:09 PM 
 
4. Enter the last four digits of your Social Security Number (xxxx) and Date of Birth (mm/dd/yyyy) as indicated and click Submit. You will be taken to the next sign-up page. 5. Create a i[employer]Systems ID. This will be your [employer] login ID and cannot be changed, so think of one that is secure and easy to remember. 6. Create a [employer] password. You can change your password at any time. 7. Enter your Password Reset Question and Answer. This can be used at a later time if you forget your password. 8. Enter your e-mail address. You will receive e-mail notification when new information is available in [address] 9. Click Sign Up. You can now view and download portions of your medical record. 10. Click the Download Summary menu link to download a portable copy of your medical information. If you have questions, please visit the Frequently Asked Questions section of the [employer] website. Remember, [employer] is NOT to be used for urgent needs. For medical emergencies, dial 911. Now available from your iPhone and Android! Please provide this summary of care documentation to your next provider. Your primary care clinician is listed as Adriana Bonds. If you have any questions after today's visit, please call [phone].

## 2018-01-11 NOTE — PROGRESS NOTES
Chief Complaint   Patient presents with    Asthma     3 week follow up    . SUBECTIVE:    Cassandra Fernandez is a 78 y.o. female comes in for return visit stating that she is much better now as compared to her last visit as it relates to her cough. She responded quite nicely to the dual MDI. Most recently, she thinks she had developed another upper respiratory infection. She works in the school system. Her symptoms are quite mild at this point and consist predominantly of a slight bit of nasal congestion. Current Outpatient Prescriptions   Medication Sig Dispense Refill    albuterol (PROVENTIL HFA, VENTOLIN HFA, PROAIR HFA) 90 mcg/actuation inhaler Take 2 Puffs by inhalation every four (4) hours as needed for Wheezing. 1 Inhaler 1     Past Medical History:   Diagnosis Date    Pneumonia 04/01/2010     Past Surgical History:   Procedure Laterality Date    COLONOSCOPY N/A 5/23/2016    COLONOSCOPY performed by Radha Dunaway MD at [address] HX BREAST BIOPSY Right     Benign. Patient thinks biopsy was on rt breast lower areolar area. Along time ago.     HX GI      colonscopy - about 2011    HX GYN      hysterectomy    HX HYSTERECTOMY      HX ORTHOPAEDIC      surgery for broken foot - right     No Known Allergies    REVIEW OF SYSTEMS:  Review of Systems - Negative except   ENT ROS: negative for - headaches, hearing change, nasal congestion, oral lesions, tinnitus, visual changes or   Respiratory ROS: no cough, shortness of breath, or wheezing  Cardiovascular ROS: no chest pain or dyspnea on exertion  Gastrointestinal ROS: no abdominal pain, change in bowel habits, or black or blood  Genito-Urinary ROS: no dysuria, trouble voiding, or hematuria  Musculoskeletal ROS: negative  Neurological ROS: no TIA or stroke symptoms      Social History     Social History    Marital status: LEGALLY      Spouse name: N/A    Number of children: 3    Years of education: N/A     Occupational History    retired cna      Social History Main Topics    Smoking status: Never Smoker    Smokeless tobacco: Never Used    Alcohol use No    Drug use: No    Sexual activity: Not Currently     Other Topics Concern    None     Social History Narrative   r  Family History   Problem Relation Age of Onset    Cancer Sister 39     colon ca       OBJECTIVE:  Visit Vitals    /65 (BP 1 Location: Right arm, BP Patient Position: Sitting)    Pulse 88    Temp 99 °F (37.2 °C) (Oral)    Resp 16    Ht 5' 3\" (1.6 m)    Wt 117 lb (53.1 kg)    SpO2 96%    BMI 20.73 kg/m2     ENT: perrla,  eom intact  NECK: supple. Thyroid normal, no JVD  CHEST: clear to ascultation and percussion   HEART: regular rate and rhythm  ABD: soft, bowel sounds active,   EXTREMITIES: no edema, pulse 1+  INTEGUMENT: clear      ASSESSMENT:  1. Mild reactive airways disease, unspecified whether persistent    2. Upper respiratory tract infection, unspecified type        PLAN:    1. The patient's reactive airway disease has resolved. Nothing more need to be done for now. 2. She might have a mild upper respiratory infection but it is difficult to say. No treatment is started at this point. 3. I encouraged her to remain as physically active as possible. Follow-up Disposition:  Return in about 6 months (around 7/11/2018).       Tammy Quinones MD

## 2018-01-11 NOTE — PROGRESS NOTES
Chief Complaint   Patient presents with    Asthma     3 week follow up      1. Have you been to the ER, urgent care clinic since your last visit? Hospitalized since your last visit? No    2. Have you seen or consulted any other health care providers outside of the [address] [place] [address] since your last visit? Include any pap smears or colon screening.  No

## 2019-01-24 ENCOUNTER — OFFICE VISIT (OUTPATIENT)
Dept: INTERNAL MEDICINE CLINIC | Age: 69
End: 2019-01-24

## 2019-01-24 VITALS
BODY MASS INDEX: 21.03 KG/M2 | OXYGEN SATURATION: 92 % | HEIGHT: 63 IN | HEART RATE: 78 BPM | SYSTOLIC BLOOD PRESSURE: 181 MMHG | TEMPERATURE: 98.3 F | DIASTOLIC BLOOD PRESSURE: 82 MMHG | WEIGHT: 118.7 LBS | RESPIRATION RATE: 14 BRPM

## 2019-01-24 DIAGNOSIS — E78.5 DYSLIPIDEMIA: ICD-10-CM

## 2019-01-24 DIAGNOSIS — I10 ESSENTIAL HYPERTENSION: ICD-10-CM

## 2019-01-24 DIAGNOSIS — R19.5 OCCULT BLOOD IN STOOLS: ICD-10-CM

## 2019-01-24 DIAGNOSIS — J45.909 MILD REACTIVE AIRWAYS DISEASE, UNSPECIFIED WHETHER PERSISTENT: Primary | ICD-10-CM

## 2019-01-24 DIAGNOSIS — D64.9 ANEMIA, UNSPECIFIED TYPE: ICD-10-CM

## 2019-01-24 RX ORDER — AMLODIPINE BESYLATE 5 MG/1
5 TABLET ORAL DAILY
Qty: 30 TAB | Refills: 11 | Status: SHIPPED | OUTPATIENT
Start: 2019-01-24 | End: 2020-03-16 | Stop reason: SDUPTHER

## 2019-01-24 NOTE — PROGRESS NOTES
Chief Complaint Patient presents with  Cold Patient states that she has had a cold/cough x 1 month. 1. Have you been to the ER, urgent care clinic since your last visit? Hospitalized since your last visit? No 
 
2. Have you seen or consulted any other health care providers outside of the [address] [place] [address] since your last visit? Include any pap smears or colon screening.  No

## 2019-01-25 NOTE — PROGRESS NOTES
[address] [place] [address] and Primary Care 
[place] [address] 
[address] [place] [address] [address] Phone:  [phone]  Fax: [phone] Chief Complaint Patient presents with  Cold Patient states that she has had a cold/cough x 1 month. .   
 
SUBJECTIVE: 
  Cammy Shannon is a 77 y.o. female Comes in stating that she is doing well other than having a cough for the last three to four weeks. Accompanying this has been mild dyspnea on exertion, which she has had for years. The character of the shortness of breath has not changed. I worked this up previously and the workup was entirely negative. She does not have a history of cigarette smoking. Her current cough is nonproductive, but worse nocturnally and accompanied by occasional wheezing. Her blood pressure is also elevated today, more so than it has ever been. She has a mild anemia and this will be pursued. She also has a history of dyslipidemia. Her risk factor profile is quite low, however. Current Outpatient Medications Medication Sig Dispense Refill  amLODIPine (NORVASC) 5 mg tablet Take 1 Tab by mouth daily. 30 Tab 11  
 albuterol (PROVENTIL HFA, VENTOLIN HFA, PROAIR HFA) 90 mcg/actuation inhaler Take 2 Puffs by inhalation every four (4) hours as needed for Wheezing. 1 Inhaler 1 Past Medical History:  
Diagnosis Date  Pneumonia 04/01/2010 Past Surgical History:  
Procedure Laterality Date  COLONOSCOPY N/A 5/23/2016 COLONOSCOPY performed by Ivy Garduno MD at [facility] ENDOSCOPY  
 HX BREAST BIOPSY Right Benign. Patient thinks biopsy was on rt breast lower areolar area. Along time ago.  HX GI    
 colonscopy - about 2011  HX GYN    
 hysterectomy  HX HYSTERECTOMY  HX ORTHOPAEDIC    
 surgery for broken foot - right No Known Allergies REVIEW OF SYSTEMS: 
General: negative for - chills or fever ENT: negative for - headaches, nasal congestion or tinnitus Respiratory: negative for - cough, hemoptysis, shortness of breath or wheezing Cardiovascular : negative for - chest pain, edema, palpitations or shortness of breath Gastrointestinal: negative for - abdominal pain, blood in stools, heartburn or nausea/vomiting Genito-Urinary: no dysuria, trouble voiding, or hematuria Musculoskeletal: negative for - gait disturbance, joint pain, joint stiffness or joint swelling Neurological: no TIA or stroke symptoms Hematologic: no bruises, no bleeding, no swollen glands Integument: no lumps, mole changes, nail changes or rash Endocrine: no malaise/lethargy or unexpected weight changes Social History Socioeconomic History  Marital status: LEGALLY  Spouse name: Not on file  Number of children: 3  
 Years of education: Not on file  Highest education level: Not on file Occupational History  Occupation: retired cna  Occupation: Current  [employer] Tobacco Use  Smoking status: Never Smoker  Smokeless tobacco: Never Used Substance and Sexual Activity  Alcohol use: No  
 Drug use: No  
 Sexual activity: Not Currently Family History Problem Relation Age of Onset  Cancer Sister 39  
     colon ca OBJECTIVE: 
 
Visit Vitals /82 Pulse 78 Temp 98.3 °F (36.8 °C) (Oral) Resp 14 Ht 5' 3\" (1.6 m) Wt 118 lb 11.2 oz (53.8 kg) SpO2 92% BMI 21.03 kg/m² CONSTITUTIONAL: well , well nourished, appears age appropriate EYES: perrla, eom intact ENMT:moist mucous membranes, pharynx clear NECK: supple. Thyroid normal,no JVD RESPIRATORY: Chest: Fine expiratory rhonchi CARDIOVASCULAR: Heart: regular rate and rhythm GASTROINTESTINAL: Abdomen: soft, bowel sounds active HEMATOLOGIC: no pathological lymph nodes palpated MUSCULOSKELETAL: Extremities: no edema, pulse 1+ INTEGUMENT: No unusual rashes or suspicious skin lesions noted.  Nails appear normal. 
 NEUROLOGIC: non-focal exam  
MENTAL STATUS: alert and oriented, appropriate affect ASSESSMENT: 
1. Mild reactive airways disease, unspecified whether persistent 2. Essential hypertension 3. Anemia, unspecified type 4. Dyslipidemia 5. Occult blood in stools PLAN: 
 
1. I think the patient's cough is probably coming from reactive airway disease. She will begin using a Breo inhaler, one inhalation daily. Chest xray will also be obtained. As I stated earlier, there is no history of cigarette smoking. 2. Her blood pressure is markedly elevated today after taken on several occasions. Specifically I got 200/110. She now has sustained hypertension and will be started on Amlodipine, which will be titrated up accordingly. 3. She does have a history of anemia and CBC will be checked. 4. Lipids will also be checked tomorrow to determine if the levels are high enough to justify intervention. . 
Orders Placed This Encounter  XR CHEST PA LAT  APOLIPOPROTEIN B  
 CBC WITH AUTOMATED DIFF  
 LIPID PANEL  
 METABOLIC PANEL, COMPREHENSIVE  
 TSH 3RD GENERATION  
 URINALYSIS W/ RFLX MICROSCOPIC  amLODIPine (NORVASC) 5 mg tablet Follow-up Disposition: 
Return in about 4 weeks (around 2/21/2019).  
 
 
Manuela Miramontes MD

## 2019-01-27 LAB
ALBUMIN SERPL-MCNC: 4.3 G/DL (ref 3.6–4.8)
ALBUMIN/GLOB SERPL: 1.9 {RATIO} (ref 1.2–2.2)
ALP SERPL-CCNC: 55 IU/L (ref 39–117)
ALT SERPL-CCNC: 18 IU/L (ref 0–32)
APO B SERPL-MCNC: 91 MG/DL (ref 54–133)
APPEARANCE UR: CLEAR
AST SERPL-CCNC: 14 IU/L (ref 0–40)
BACTERIA #/AREA URNS HPF: ABNORMAL /[HPF]
BASOPHILS # BLD AUTO: 0 X10E3/UL (ref 0–0.2)
BASOPHILS NFR BLD AUTO: 1 %
BILIRUB SERPL-MCNC: 0.4 MG/DL (ref 0–1.2)
BILIRUB UR QL STRIP: NEGATIVE
BUN SERPL-MCNC: 12 MG/DL (ref 8–27)
BUN/CREAT SERPL: 13 (ref 12–28)
CALCIUM SERPL-MCNC: 9.2 MG/DL (ref 8.7–10.3)
CASTS URNS QL MICRO: ABNORMAL /LPF
CHLORIDE SERPL-SCNC: 107 MMOL/L (ref 96–106)
CHOLEST SERPL-MCNC: 163 MG/DL (ref 100–199)
CO2 SERPL-SCNC: 23 MMOL/L (ref 20–29)
COLOR UR: YELLOW
CREAT SERPL-MCNC: 0.9 MG/DL (ref 0.57–1)
EOSINOPHIL # BLD AUTO: 0.2 X10E3/UL (ref 0–0.4)
EOSINOPHIL NFR BLD AUTO: 4 %
EPI CELLS #/AREA URNS HPF: ABNORMAL /HPF
ERYTHROCYTE [DISTWIDTH] IN BLOOD BY AUTOMATED COUNT: 13.7 % (ref 12.3–15.4)
GLOBULIN SER CALC-MCNC: 2.3 G/DL (ref 1.5–4.5)
GLUCOSE SERPL-MCNC: 89 MG/DL (ref 65–99)
GLUCOSE UR QL: NEGATIVE
HCT VFR BLD AUTO: 32.3 % (ref 34–46.6)
HDLC SERPL-MCNC: 57 MG/DL
HGB BLD-MCNC: 10.8 G/DL (ref 11.1–15.9)
HGB UR QL STRIP: NEGATIVE
IMM GRANULOCYTES # BLD AUTO: 0 X10E3/UL (ref 0–0.1)
IMM GRANULOCYTES NFR BLD AUTO: 0 %
KETONES UR QL STRIP: NEGATIVE
LDLC SERPL CALC-MCNC: 92 MG/DL (ref 0–99)
LEUKOCYTE ESTERASE UR QL STRIP: ABNORMAL
LYMPHOCYTES # BLD AUTO: 2.3 X10E3/UL (ref 0.7–3.1)
LYMPHOCYTES NFR BLD AUTO: 42 %
MCH RBC QN AUTO: 31.2 PG (ref 26.6–33)
MCHC RBC AUTO-ENTMCNC: 33.4 G/DL (ref 31.5–35.7)
MCV RBC AUTO: 93 FL (ref 79–97)
MICRO URNS: ABNORMAL
MONOCYTES # BLD AUTO: 0.5 X10E3/UL (ref 0.1–0.9)
MONOCYTES NFR BLD AUTO: 9 %
MUCOUS THREADS URNS QL MICRO: PRESENT
NEUTROPHILS # BLD AUTO: 2.5 X10E3/UL (ref 1.4–7)
NEUTROPHILS NFR BLD AUTO: 44 %
NITRITE UR QL STRIP: NEGATIVE
PH UR STRIP: 6 [PH] (ref 5–7.5)
PLATELET # BLD AUTO: 272 X10E3/UL (ref 150–379)
POTASSIUM SERPL-SCNC: 3.7 MMOL/L (ref 3.5–5.2)
PROT SERPL-MCNC: 6.6 G/DL (ref 6–8.5)
PROT UR QL STRIP: NEGATIVE
RBC # BLD AUTO: 3.46 X10E6/UL (ref 3.77–5.28)
RBC #/AREA URNS HPF: ABNORMAL /HPF
SODIUM SERPL-SCNC: 146 MMOL/L (ref 134–144)
SP GR UR: 1.01 (ref 1–1.03)
TRIGL SERPL-MCNC: 71 MG/DL (ref 0–149)
TSH SERPL DL<=0.005 MIU/L-ACNC: 1.49 UIU/ML (ref 0.45–4.5)
UROBILINOGEN UR STRIP-MCNC: 0.2 MG/DL (ref 0.2–1)
VLDLC SERPL CALC-MCNC: 14 MG/DL (ref 5–40)
WBC # BLD AUTO: 5.5 X10E3/UL (ref 3.4–10.8)
WBC #/AREA URNS HPF: ABNORMAL /HPF

## 2019-02-03 DIAGNOSIS — N39.0 URINARY TRACT INFECTION WITHOUT HEMATURIA, SITE UNSPECIFIED: Primary | ICD-10-CM

## 2019-02-03 RX ORDER — CIPROFLOXACIN 500 MG/1
500 TABLET ORAL 2 TIMES DAILY
Qty: 14 TAB | Refills: 0 | Status: SHIPPED | OUTPATIENT
Start: 2019-02-03 | End: 2019-02-10

## 2019-02-28 ENCOUNTER — OFFICE VISIT (OUTPATIENT)
Dept: INTERNAL MEDICINE CLINIC | Age: 69
End: 2019-02-28

## 2019-02-28 VITALS
DIASTOLIC BLOOD PRESSURE: 82 MMHG | OXYGEN SATURATION: 91 % | SYSTOLIC BLOOD PRESSURE: 155 MMHG | TEMPERATURE: 98.2 F | HEIGHT: 63 IN | RESPIRATION RATE: 16 BRPM | BODY MASS INDEX: 20.62 KG/M2 | HEART RATE: 64 BPM | WEIGHT: 116.4 LBS

## 2019-02-28 DIAGNOSIS — Z00.00 WELLNESS EXAMINATION: ICD-10-CM

## 2019-02-28 DIAGNOSIS — Z13.39 SCREENING FOR ALCOHOLISM: ICD-10-CM

## 2019-02-28 DIAGNOSIS — I10 ESSENTIAL HYPERTENSION: ICD-10-CM

## 2019-02-28 DIAGNOSIS — J45.909 MILD REACTIVE AIRWAYS DISEASE, UNSPECIFIED WHETHER PERSISTENT: Primary | ICD-10-CM

## 2019-02-28 DIAGNOSIS — Z13.31 SCREENING FOR DEPRESSION: ICD-10-CM

## 2019-02-28 DIAGNOSIS — R06.09 DYSPNEA ON EXERTION: ICD-10-CM

## 2019-02-28 NOTE — PROGRESS NOTES
Chief Complaint Patient presents with [address] [place] [address] Annual Wellness Visit 1. Have you been to the ER, urgent care clinic since your last visit? Hospitalized since your last visit? No 
 
2. Have you seen or consulted any other health care providers outside of the [address] [place] [address] since your last visit? Include any pap smears or colon screening.  No

## 2019-03-01 NOTE — PROGRESS NOTES
Chief Complaint Patient presents with [place] Annual Wellness Visit [place] SUBECTIVE: 
 
Poly Baca is a 77 y.o. female who comes in for return visit stating that she feels great. She no longer is coughing. She had reactive airway disease on her last visit and used an inhaler which was quite successful in alleviating the wheezing. She continues to have chronic shortness of breath which she has had for years, and I do not think this is pathological, but more related to deconditioning. Finally, she has a past history of primary hypertension. Current Outpatient Medications Medication Sig Dispense Refill  amLODIPine (NORVASC) 5 mg tablet Take 1 Tab by mouth daily. 30 Tab 11  
 albuterol (PROVENTIL HFA, VENTOLIN HFA, PROAIR HFA) 90 mcg/actuation inhaler Take 2 Puffs by inhalation every four (4) hours as needed for Wheezing. 1 Inhaler 1 Past Medical History:  
Diagnosis Date  Pneumonia 04/01/2010 Past Surgical History:  
Procedure Laterality Date  COLONOSCOPY N/A 5/23/2016 COL[facility]OSCOPY performed by Mylene Nolan MD at [facility] ENDOSCOPY  
 HX BREAST BIOPSY Right Benign. Patient thinks biopsy was on rt breast lower areolar area. Along time ago.  HX GI    
 colonscopy - about 2011  HX GYN    
 hysterectomy  HX HYSTERECTOMY  HX ORTHOPAEDIC    
 surgery for broken foot - right No Known Allergies REVIEW OF SYSTEMS: 
Review of Systems - Negative except ENT ROS: negative for - headaches, hearing change, nasal congestion, oral lesions, tinnitus, visual changes or Respiratory ROS: no cough, shortness of breath, or wheezing Cardiovascular ROS: no chest pain or dyspnea on exertion Gastrointestinal ROS: no abdominal pain, change in bowel habits, or black or blood Genito-Urinary ROS: no dysuria, trouble voiding, or hematuria Musculoskeletal ROS: negative Neurological ROS: no TIA or stroke symptoms Social History Socioeconomic History  Marital status: LEGALLY  Spouse name: Not on file  Number of children: 3  
 Years of education: Not on file  Highest education level: Not on file Occupational History  Occupation: retired cna  Occupation: Current  [employer] Tobacco Use  Smoking status: Never Smoker  Smokeless tobacco: Never Used Substance and Sexual Activity  Alcohol use: No  
 Drug use: No  
 Sexual activity: Not Currently  
r Family History Problem Relation Age of Onset  Cancer Sister 39  
     colon ca OBJECTIVE: 
Visit Vitals /82 Pulse 64 Temp 98.2 °F (36.8 °C) (Oral) Resp 16 Ht 5' 3\" (1.6 m) Wt 116 lb 6.4 oz (52.8 kg) SpO2 91% BMI 20.62 kg/m² ENT: perrla,  eom intact NECK: supple. Thyroid normal, no JVD CHEST: clear to ascultation and percussion HEART: regular rate and rhythm ABD: soft, bowel sounds active, EXTREMITIES: no edema, pulse 1+ INTEGUMENT: clear ASSESSMENT: 
1. Mild reactive airways disease, unspecified whether persistent 2. Dyspnea on exertion 3. Essential hypertension 4. Screening for alcoholism 5. Screening for depression 6. Wellness examination PLAN: 
 
1. The patient's reactive airway disease has resolved completely. 2. Her dyspnea is related to deconditioning and nothing need be done other than exercising on a consistent basis. 3. Patient remains normotensive and will continue her antihypertensive medication as prescribed. Follow-up Disposition: 
Return in about 6 months (around 8/28/2019). Jessica Chou MD  
This is the Subsequent Medicare Annual Wellness Exam, performed 12 months or more after the Initial AWV or the last Subsequent AWV I have reviewed the patient's medical history in detail and updated the computerized patient record. History Past Medical History:  
Diagnosis Date  Pneumonia 04/01/2010 Past Surgical History: Procedure Laterality Date  COLONOSCOPY N/A 5/23/2016 COLONOSCOPY performed by Josh Fraire MD at [facility] ENDOSCOPY  
 HX BREAST BIOPSY Right Benign. Patient thinks biopsy was on rt breast lower areolar area. Along time ago.  HX GI    
 colonscopy - about 2011  HX GYN    
 hysterectomy  HX HYSTERECTOMY  HX ORTHOPAEDIC    
 surgery for broken foot - right Current Outpatient Medications Medication Sig Dispense Refill  amLODIPine (NORVASC) 5 mg tablet Take 1 Tab by mouth daily. 30 Tab 11  
 albuterol (PROVENTIL HFA, VENTOLIN HFA, PROAIR HFA) 90 mcg/actuation inhaler Take 2 Puffs by inhalation every four (4) hours as needed for Wheezing. 1 Inhaler 1 No Known Allergies Family History Problem Relation Age of Onset  Cancer Sister 39  
     colon ca Social History Tobacco Use  Smoking status: Never Smoker  Smokeless tobacco: Never Used Substance Use Topics  Alcohol use: No  
 
Patient Active Problem List  
Diagnosis Code  Physical exam, annual Z00.00  Encounter for screening mammogram for high-risk patient Z12.31  
 Anemia D64.9  Dyspnea on exertion R06.09  
 Mild reactive airways disease J45.909  Essential hypertension I10 Depression Risk Factor Screening:  
 
3 most recent PHQ Screens 2/28/2019 Little interest or pleasure in doing things Not at all Feeling down, depressed, irritable, or hopeless Not at all Total Score PHQ 2 0 Alcohol Risk Factor Screening: You do not drink alcohol or very rarely. Functional Ability and Level of Safety:  
Hearing Loss Hearing is good. Activities of Daily Living The home contains: no safety equipment. Patient does total self care Fall Risk Fall Risk Assessment, last 12 mths 2/28/2019 Able to walk? Yes Fall in past 12 months? No  
 
 
Abuse Screen Patient is not abused Cognitive Screening Evaluation of Cognitive Function: Has your family/caregiver stated any concerns about your memory: no 
Normal 
 
Patient Care Team  
Patient Care Team: 
Luz Corbin MD as PCP - General (Internal Medicine) Assessment/Plan Education and counseling provided: 
Are appropriate based on today's review and evaluation Diagnoses and all orders for this visit: 
 
1. Mild reactive airways disease, unspecified whether persistent 2. Dyspnea on exertion 3. Essential hypertension 4. Screening for alcoholism -     [place] ANNUAL ALCOHOL SCREEN 15 MIN 
 
5. Screening for depression 
-     [facility] Wellness examination Health Maintenance Due Topic Date Due  
 Hepatitis C Screening  1950  Shingrix Vaccine Age 50> (1 of 2) 01/20/2001  
 FOBT Q 1 YEAR AGE 50-75  01/20/2001  GLAUCOMA SCREENING Q2Y  01/20/2016

## 2019-03-01 NOTE — PATIENT INSTRUCTIONS
Medicare Wellness Visit, Female The best way to live healthy is to have a lifestyle where you eat a well-balanced diet, exercise regularly, limit alcohol use, and quit all forms of tobacco/nicotine, if applicable. Regular preventive services are another way to keep healthy. Preventive services (vaccines, screening tests, monitoring & exams) can help personalize your care plan, which helps you manage your own care. Screening tests can find health problems at the earliest stages, when they are easiest to treat. [facility] follows the current, evidence-based guidelines published by the [facility] ([facility]) when recommending preventive services for our patients. Because we follow these guidelines, sometimes recommendations change over time as research supports it. (For example, mammograms used to be recommended annually. Even though Medicare will still pay for an annual mammogram, the newer guidelines recommend a mammogram every two years for women of average risk.) Of course, you and your doctor may decide to screen more often for some diseases, based on your risk and your health status. Preventive services for you include: - Medicare offers their members a free annual wellness visit, which is time for you and your primary care provider to discuss and plan for your preventive service needs. Take advantage of this benefit every year! 
-All adults over the age of 72 should receive the recommended pneumonia vaccines. Current USPSTF guidelines recommend a series of two vaccines for the best pneumonia protection.  
-All adults should have a flu vaccine yearly and a tetanus vaccine every 10 years. All adults age 61 and older should receive a shingles vaccine once in their lifetime.   
-A bone mass density test is recommended when a woman turns 65 to screen for osteoporosis. This test is only recommended one time, as a screening. Some providers will use this same test as a disease monitoring tool if you already have osteoporosis. -All adults age 38-68 who are overweight should have a diabetes screening test once every three years.  
-Other screening tests and preventive services for persons with diabetes include: an eye exam to screen for diabetic retinopathy, a kidney function test, a foot exam, and stricter control over your cholesterol.  
-Cardiovascular screening for adults with routine risk involves an electrocardiogram (ECG) at intervals determined by your doctor.  
-Colorectal cancer screenings should be done for adults age 54-65 with no increased risk factors for colorectal cancer. There are a number of acceptable methods of screening for this type of cancer. Each test has its own benefits and drawbacks. Discuss with your doctor what is most appropriate for you during your annual wellness visit. The different tests include: colonoscopy (considered the best screening method), a fecal occult blood test, a fecal DNA test, and sigmoidoscopy. -Breast cancer screenings are recommended every other year for women of normal risk, age 54-69. 
-Cervical cancer screenings for women over age 72 are only recommended with certain risk factors.  
-All adults born between [facility] should be screened once for Hepatitis C. Here is a list of your current Health Maintenance items (your personalized list of preventive services) with a due date: 
Health Maintenance Due Topic Date Due  
 Hepatitis C Test  1950  Shingles Vaccine (1 of 2) 01/20/2001  Stool testing for trace blood  01/20/2001  Glaucoma Screening   01/20/2016

## 2019-08-29 ENCOUNTER — OFFICE VISIT (OUTPATIENT)
Dept: INTERNAL MEDICINE CLINIC | Age: 69
End: 2019-08-29

## 2019-08-29 VITALS
DIASTOLIC BLOOD PRESSURE: 81 MMHG | WEIGHT: 115.4 LBS | SYSTOLIC BLOOD PRESSURE: 149 MMHG | RESPIRATION RATE: 16 BRPM | HEIGHT: 63 IN | TEMPERATURE: 98.1 F | OXYGEN SATURATION: 92 % | HEART RATE: 74 BPM | BODY MASS INDEX: 20.45 KG/M2

## 2019-08-29 DIAGNOSIS — Z12.11 COLON CANCER SCREENING: ICD-10-CM

## 2019-08-29 DIAGNOSIS — I10 ESSENTIAL HYPERTENSION: ICD-10-CM

## 2019-08-29 DIAGNOSIS — R59.1 LYMPHADENOPATHY OF HEAD AND NECK: ICD-10-CM

## 2019-08-29 DIAGNOSIS — E78.5 DYSLIPIDEMIA: ICD-10-CM

## 2019-08-29 DIAGNOSIS — Z12.31 SCREENING MAMMOGRAM, ENCOUNTER FOR: Primary | ICD-10-CM

## 2019-08-29 DIAGNOSIS — E04.9 NODULAR GOITER: ICD-10-CM

## 2019-08-29 DIAGNOSIS — D64.9 ANEMIA, UNSPECIFIED TYPE: ICD-10-CM

## 2019-08-29 NOTE — PROGRESS NOTES
Chief Complaint   Patient presents with    Hypertension     6 month follow up      1. Have you been to the ER, urgent care clinic since your last visit? Hospitalized since your last visit? No    2. Have you seen or consulted any other health care providers outside of the [address] [place] [address] since your last visit? Include any pap smears or colon screening.  No

## 2019-08-31 LAB
ALBUMIN SERPL ELPH-MCNC: 4 G/DL (ref 2.9–4.4)
ALBUMIN/GLOB SERPL: 1.3 {RATIO} (ref 0.7–1.7)
ALPHA1 GLOB SERPL ELPH-MCNC: 0.2 G/DL (ref 0–0.4)
ALPHA2 GLOB SERPL ELPH-MCNC: 0.8 G/DL (ref 0.4–1)
APO B SERPL-MCNC: 89 MG/DL
B-GLOBULIN SERPL ELPH-MCNC: 1.1 G/DL (ref 0.7–1.3)
BASOPHILS # BLD AUTO: 0.1 X10E3/UL (ref 0–0.2)
BASOPHILS NFR BLD AUTO: 1 %
EOSINOPHIL # BLD AUTO: 0.1 X10E3/UL (ref 0–0.4)
EOSINOPHIL NFR BLD AUTO: 2 %
ERYTHROCYTE [DISTWIDTH] IN BLOOD BY AUTOMATED COUNT: 11.7 % (ref 12.3–15.4)
G6PD BLD QN: 49 U/10E12 RBC (ref 146–376)
GAMMA GLOB SERPL ELPH-MCNC: 0.9 G/DL (ref 0.4–1.8)
GLOBULIN SER CALC-MCNC: 3 G/DL (ref 2.2–3.9)
HCT VFR BLD AUTO: 36.4 % (ref 34–46.6)
HGB BLD-MCNC: 11.5 G/DL (ref 11.1–15.9)
IMM GRANULOCYTES # BLD AUTO: 0 X10E3/UL (ref 0–0.1)
IMM GRANULOCYTES NFR BLD AUTO: 0 %
IRON SATN MFR SERPL: 23 % (ref 15–55)
IRON SERPL-MCNC: 65 UG/DL (ref 27–139)
LYMPHOCYTES # BLD AUTO: 1.6 X10E3/UL (ref 0.7–3.1)
LYMPHOCYTES NFR BLD AUTO: 34 %
M PROTEIN SERPL ELPH-MCNC: NORMAL G/DL
MCH RBC QN AUTO: 30.2 PG (ref 26.6–33)
MCHC RBC AUTO-ENTMCNC: 31.6 G/DL (ref 31.5–35.7)
MCV RBC AUTO: 96 FL (ref 79–97)
MONOCYTES # BLD AUTO: 0.4 X10E3/UL (ref 0.1–0.9)
MONOCYTES NFR BLD AUTO: 9 %
NEUTROPHILS # BLD AUTO: 2.5 X10E3/UL (ref 1.4–7)
NEUTROPHILS NFR BLD AUTO: 54 %
PLATELET # BLD AUTO: 287 X10E3/UL (ref 150–450)
PLEASE NOTE, 011150: NORMAL
PROT SERPL-MCNC: 7 G/DL (ref 6–8.5)
RBC # BLD AUTO: 3.81 X10E6/UL (ref 3.77–5.28)
TIBC SERPL-MCNC: 286 UG/DL (ref 250–450)
UIBC SERPL-MCNC: 221 UG/DL (ref 118–369)
WBC # BLD AUTO: 4.8 X10E3/UL (ref 3.4–10.8)

## 2019-09-02 PROBLEM — D75.A G6PD DEFICIENCY: Status: ACTIVE | Noted: 2019-09-02

## 2019-09-12 ENCOUNTER — HOSPITAL ENCOUNTER (OUTPATIENT)
Dept: MAMMOGRAPHY | Age: 69
Discharge: HOME | End: 2019-09-12
Attending: INTERNAL MEDICINE
Payer: MEDICARE

## 2019-09-12 ENCOUNTER — HOSPITAL ENCOUNTER (OUTPATIENT)
Dept: ULTRASOUND IMAGING | Age: 69
Discharge: HOME | End: 2019-09-12
Attending: INTERNAL MEDICINE
Payer: MEDICARE

## 2019-09-12 DIAGNOSIS — E04.9 NODULAR GOITER: ICD-10-CM

## 2019-09-12 DIAGNOSIS — Z12.31 SCREENING MAMMOGRAM, ENCOUNTER FOR: ICD-10-CM

## 2019-09-12 PROCEDURE — 77067 SCR MAMMO BI INCL CAD: CPT

## 2019-09-12 PROCEDURE — 76536 US EXAM OF HEAD AND NECK: CPT

## 2019-09-26 ENCOUNTER — OFFICE VISIT (OUTPATIENT)
Dept: INTERNAL MEDICINE CLINIC | Age: 69
End: 2019-09-26

## 2019-09-26 VITALS
BODY MASS INDEX: 20.13 KG/M2 | DIASTOLIC BLOOD PRESSURE: 70 MMHG | HEIGHT: 63 IN | TEMPERATURE: 98.9 F | WEIGHT: 113.6 LBS | OXYGEN SATURATION: 93 % | RESPIRATION RATE: 16 BRPM | HEART RATE: 71 BPM | SYSTOLIC BLOOD PRESSURE: 126 MMHG

## 2019-09-26 DIAGNOSIS — D64.9 ANEMIA, UNSPECIFIED TYPE: ICD-10-CM

## 2019-09-26 DIAGNOSIS — R59.1 LYMPHADENOPATHY OF HEAD AND NECK: ICD-10-CM

## 2019-09-26 DIAGNOSIS — I10 ESSENTIAL HYPERTENSION: Primary | ICD-10-CM

## 2019-09-26 DIAGNOSIS — E04.9 NODULAR GOITER: ICD-10-CM

## 2019-09-26 NOTE — PROGRESS NOTES
[address] [place] [address] and Primary Care  [place] [address]  [address] [place] [address]  Phone:  [phone]  Fax: [phone]       Chief Complaint   Patient presents with    Thyroid Problem     1 month follow up    . SUBJECTIVE:    Radha Benrardo is a 77 y.o. female Comes in for return visit stating she has done well. I did a thyroid ultrasound and nothing had changed as compared to the last ultrasound, therefore no further workup is indicated. I also was concerned about lymphadenopathy in her anterior cervical chain. She has some shoddy nodes that were there. She has an anemia, which is secondary to G6PD deficiency. Finally, she has a past history of primary hypertension. Current Outpatient Medications   Medication Sig Dispense Refill    amLODIPine (NORVASC) 5 mg tablet Take 1 Tab by mouth daily. 30 Tab 11    albuterol (PROVENTIL HFA, VENTOLIN HFA, PROAIR HFA) 90 mcg/actuation inhaler Take 2 Puffs by inhalation every four (4) hours as needed for Wheezing. 1 Inhaler 1     Past Medical History:   Diagnosis Date    Pneumonia 04/01/2010     Past Surgical History:   Procedure Laterality Date    COLONOSCOPY N/A 5/23/2016    COLONOSCOPY performed by Rose Rudd MD at [address] HX BREAST BIOPSY Right     Benign. Patient thinks biopsy was on rt breast lower areolar area. Along time ago.     HX GI      colonscopy - about 2011    HX GYN      hysterectomy    HX HYSTERECTOMY      HX ORTHOPAEDIC      surgery for broken foot - right     No Known Allergies      REVIEW OF SYSTEMS:  General: negative for - chills or fever  ENT: negative for - headaches, nasal congestion or tinnitus  Respiratory: negative for - cough, hemoptysis, shortness of breath or wheezing  Cardiovascular : negative for - chest pain, edema, palpitations or shortness of breath  Gastrointestinal: negative for - abdominal pain, blood in stools, heartburn or nausea/vomiting  Genito-Urinary: no dysuria, trouble voiding, or hematuria  Musculoskeletal: negative for - gait disturbance, joint pain, joint stiffness or joint swelling  Neurological: no TIA or stroke symptoms  Hematologic: no bruises, no bleeding, no swollen glands  Integument: no lumps, mole changes, nail changes or rash  Endocrine: no malaise/lethargy or unexpected weight changes      Social History     Socioeconomic History    Marital status: LEGALLY      Spouse name: Not on file    Number of children: 3    Years of education: Not on file    Highest education level: Not on file   Occupational History    Occupation: retired cna    Occupation: Current  [employer]   Tobacco Use    Smoking status: Never Smoker    Smokeless tobacco: Never Used   Substance and Sexual Activity    Alcohol use: No    Drug use: No    Sexual activity: Not Currently     Family History   Problem Relation Age of Onset    Cancer Sister 39        colon ca       OBJECTIVE:    Visit Vitals  /70   Pulse 71   Temp 98.9 °F (37.2 °C) (Oral)   Resp 16   Ht 5' 3\" (1.6 m)   Wt 113 lb 9.6 oz (51.5 kg)   SpO2 93%   BMI 20.12 kg/m²     CONSTITUTIONAL: well , well nourished, appears age appropriate  EYES: perrla, eom intact  ENMT:moist mucous membranes, pharynx clear  NECK: supple. Thyroid normal  RESPIRATORY: Chest: clear to ascultation and percussion   CARDIOVASCULAR: Heart: regular rate and rhythm  GASTROINTESTINAL: Abdomen: soft, bowel sounds active  HEMATOLOGIC: no pathological lymph nodes palpated  MUSCULOSKELETAL: Extremities: no edema, pulse 1+   INTEGUMENT: No unusual rashes or suspicious skin lesions noted. Nails appear normal.  NEUROLOGIC: non-focal exam   MENTAL STATUS: alert and oriented, appropriate affect      ASSESSMENT:  1. Essential hypertension    2. Lymphadenopathy of head and neck    3. Anemia, unspecified type    4. Nodular goiter        PLAN:    1. BP is excellent today, no adjustments are made.   Specifically her BP is 126/70.  2. She continues to have mild shoddy notes in the anterior cervical chain, but much less than it previously was. It is predominantly on the right side. Because they are decreasing in size I see no reason to pursue this. There is nothing to suggest that these are pathological and there is no other lymphadenopathy that is present. 3. Her CBC has been historically stable with the G6PD deficiency. 4. No further workup is indicated for her nodular goiter. She is indeed euthyroid based on TSH that was entirely normal.  The nodularity does not need to be evaluated, at least for now. Follow-up and Dispositions    · Return in about 6 months (around 3/26/2020).            Jinny Marcus MD

## 2019-09-26 NOTE — PROGRESS NOTES
Chief Complaint   Patient presents with    Thyroid Problem     1 month follow up      1. Have you been to the ER, urgent care clinic since your last visit? Hospitalized since your last visit? No    2. Have you seen or consulted any other health care providers outside of the [address] [place] [address] since your last visit? Include any pap smears or colon screening.  No

## 2019-12-20 RX ORDER — PREDNISONE 20 MG/1
20 TABLET ORAL 3 TIMES DAILY
Qty: 21 TAB | Refills: 0 | Status: SHIPPED | OUTPATIENT
Start: 2019-12-20 | End: 2019-12-27

## 2020-01-28 ENCOUNTER — OFFICE VISIT (OUTPATIENT)
Dept: INTERNAL MEDICINE CLINIC | Age: 70
End: 2020-01-28

## 2020-01-28 DIAGNOSIS — R59.1 LYMPHADENOPATHY OF HEAD AND NECK: ICD-10-CM

## 2020-01-28 DIAGNOSIS — R63.4 WEIGHT LOSS: ICD-10-CM

## 2020-01-28 DIAGNOSIS — E04.9 NODULAR GOITER: ICD-10-CM

## 2020-01-28 DIAGNOSIS — R03.0 WHITE COAT SYNDROME WITH HIGH BLOOD PRESSURE BUT WITHOUT HYPERTENSION: Primary | ICD-10-CM

## 2020-01-28 NOTE — PROGRESS NOTES
Chief Complaint   Patient presents with    Elevated Blood Pressure     Patient states that she has been experiencing headaches and blurred vision. 1. Have you been to the ER, urgent care clinic since your last visit? Hospitalized since your last visit? No    2. Have you seen or consulted any other health care providers outside of the [address] [place] [address] since your last visit? Include any pap smears or colon screening.  No

## 2020-01-29 PROBLEM — R03.0 WHITE COAT SYNDROME WITH HIGH BLOOD PRESSURE BUT WITHOUT HYPERTENSION: Status: ACTIVE | Noted: 2020-01-29

## 2020-02-09 VITALS
WEIGHT: 110.4 LBS | DIASTOLIC BLOOD PRESSURE: 70 MMHG | HEART RATE: 77 BPM | HEIGHT: 63 IN | RESPIRATION RATE: 16 BRPM | TEMPERATURE: 98.4 F | BODY MASS INDEX: 19.56 KG/M2 | SYSTOLIC BLOOD PRESSURE: 130 MMHG | OXYGEN SATURATION: 94 %

## 2020-02-09 NOTE — PROGRESS NOTES
[address] [place] [address] and Primary Care  [place] [address]  [address]  [place] [address]  Phone:  [phone]  Fax: [phone]       Chief Complaint   Patient presents with    Elevated Blood Pressure     Patient states that she has been experiencing headaches and blurred vision. .      SUBJECTIVE:    Tri Montoya is a 72 y.o. female Comes in for return visit concerned because she was told that her blood pressure was elevated. She had mild blurred vision and headaches and thought this was secondary to this. She is currently on Amlodipine 5 mg daily, which she has been consistently taking. She has not been stressed of late. I have noticed that she has been losing weight. Finally, she does have a nodular goiter previously noted. Current Outpatient Medications   Medication Sig Dispense Refill    amLODIPine (NORVASC) 5 mg tablet Take 1 Tab by mouth daily. 30 Tab 11    albuterol (PROVENTIL HFA, VENTOLIN HFA, PROAIR HFA) 90 mcg/actuation inhaler Take 2 Puffs by inhalation every four (4) hours as needed for Wheezing. 1 Inhaler 1     Past Medical History:   Diagnosis Date    Pneumonia 04/01/2010     Past Surgical History:   Procedure Laterality Date    COLONOSCOPY N/A 5/23/2016    COLONOSCOPY performed by Maryjean Boeck, MD at [address] HX BREAST BIOPSY Right     Benign. Patient thinks biopsy was on rt breast lower areolar area. Along time ago.     HX GI      colonscopy - about 2011    HX GYN      hysterectomy    HX HYSTERECTOMY      HX ORTHOPAEDIC      surgery for broken foot - right     No Known Allergies      REVIEW OF SYSTEMS:  General: negative for - chills or fever  ENT: negative for - headaches, nasal congestion or tinnitus  Respiratory: negative for - cough, hemoptysis, shortness of breath or wheezing  Cardiovascular : negative for - chest pain, edema, palpitations or shortness of breath  Gastrointestinal: negative for - abdominal pain, blood in stools, heartburn or nausea/vomiting  Genito-Urinary: no dysuria, trouble voiding, or hematuria  Musculoskeletal: negative for - gait disturbance, joint pain, joint stiffness or joint swelling  Neurological: no TIA or stroke symptoms  Hematologic: no bruises, no bleeding, no swollen glands  Integument: no lumps, mole changes, nail changes or rash  Endocrine: no malaise/lethargy or unexpected weight changes      Social History     Socioeconomic History    Marital status: LEGALLY      Spouse name: Not on file    Number of children: 3    Years of education: Not on file    Highest education level: Not on file   Occupational History    Occupation: retired cna    Occupation: Current  [employer]   Tobacco Use    Smoking status: Never Smoker    Smokeless tobacco: Never Used   Substance and Sexual Activity    Alcohol use: No    Drug use: No    Sexual activity: Not Currently     Family History   Problem Relation Age of Onset    Cancer Sister 39        colon ca       OBJECTIVE:    Visit Vitals  /70   Pulse 77   Temp 98.4 °F (36.9 °C) (Oral)   Resp 16   Ht 5' 3\" (1.6 m)   Wt 110 lb 6.4 oz (50.1 kg)   SpO2 94%   BMI 19.56 kg/m²     CONSTITUTIONAL: well , well nourished, appears age appropriate  EYES: perrla, eom intact  ENMT:moist mucous membranes, pharynx clear  NECK: supple. Nodular goiter, multiple shotty nodes anterior cervical chain bilaterally  RESPIRATORY: Chest: clear to ascultation and percussion   CARDIOVASCULAR: Heart: regular rate and rhythm  GASTROINTESTINAL: Abdomen: soft, bowel sounds active  HEMATOLOGIC: no pathological lymph nodes palpated  MUSCULOSKELETAL: Extremities: no edema, pulse 1+   INTEGUMENT: No unusual rashes or suspicious skin lesions noted. Nails appear normal.  NEUROLOGIC: non-focal exam   MENTAL STATUS: alert and oriented, appropriate affect      ASSESSMENT:  1. White coat syndrome with high blood pressure but without hypertension    2.  Lymphadenopathy of head and neck    3. Weight loss    4. Nodular goiter        PLAN:    1. Her blood pressure is entirely normal.  This represents a sympathetic surge. She will continue her Amlodipine 5 mg daily and I will not make an adjustment for now. I, however, suspect that her pressure will be periodically elevated. If it becomes sustained, or the values get too high on the surges, then Amlodipine will be increased. 2. She has a mild degree of shotty nodes in the anterior cervical chains. I will see her back in the office in one month to determine clinical significance. 3. She has actually only lost 3 pounds since her last visit in September. This is not pathological.  4. She does have a nodular goiter and has been euthyroid to date. This is a previous finding and does not represent anything new or different. Follow-up and Dispositions    · Return in about 4 weeks (around 2/25/2020).            Aury Figueroa MD

## 2020-02-25 ENCOUNTER — OFFICE VISIT (OUTPATIENT)
Dept: INTERNAL MEDICINE CLINIC | Age: 70
End: 2020-02-25

## 2020-02-25 VITALS
WEIGHT: 109.9 LBS | TEMPERATURE: 98.3 F | HEIGHT: 63 IN | SYSTOLIC BLOOD PRESSURE: 135 MMHG | DIASTOLIC BLOOD PRESSURE: 76 MMHG | BODY MASS INDEX: 19.47 KG/M2 | OXYGEN SATURATION: 96 % | RESPIRATION RATE: 16 BRPM | HEART RATE: 73 BPM

## 2020-02-25 DIAGNOSIS — I10 ESSENTIAL HYPERTENSION: ICD-10-CM

## 2020-02-25 DIAGNOSIS — R63.4 WEIGHT LOSS: Primary | ICD-10-CM

## 2020-02-25 RX ORDER — AMLODIPINE BESYLATE 5 MG/1
5 TABLET ORAL DAILY
Qty: 90 TAB | Refills: 3 | Status: CANCELLED | OUTPATIENT
Start: 2020-02-25

## 2020-02-25 RX ORDER — AMLODIPINE BESYLATE 5 MG/1
5 TABLET ORAL DAILY
Qty: 30 TAB | Refills: 11 | Status: CANCELLED | OUTPATIENT
Start: 2020-02-25

## 2020-02-25 NOTE — PROGRESS NOTES
Chief Complaint   Patient presents with    Thyroid Problem     Patient is here for follow up. .      SUBECTIVE:    Herminia Aguilar is a 72 y.o. female Comes in for return visit stating that she has done reasonably well. She has lost only 1 pound since her last visit. In reality this is not clinically significant because she was somewhat ill yesterday. At this point I do not think there is any pathological weight loss for now. She has been taking her antihypertensive medication as prescribed. She does have a history of mild hypertension. Current Outpatient Medications   Medication Sig Dispense Refill    amLODIPine (NORVASC) 5 mg tablet Take 1 Tab by mouth daily. 30 Tab 11    albuterol (PROVENTIL HFA, VENTOLIN HFA, PROAIR HFA) 90 mcg/actuation inhaler Take 2 Puffs by inhalation every four (4) hours as needed for Wheezing. 1 Inhaler 1     Past Medical History:   Diagnosis Date    Pneumonia 04/01/2010     Past Surgical History:   Procedure Laterality Date    COLONOSCOPY N/A 5/23/2016    COLONOSCOPY performed by Agata Kinsey MD at [address] HX BREAST BIOPSY Right     Benign. Patient thinks biopsy was on rt breast lower areolar area. Along time ago.     HX GI      colonscopy - about 2011    HX GYN      hysterectomy    HX HYSTERECTOMY      HX ORTHOPAEDIC      surgery for broken foot - right     No Known Allergies    REVIEW OF SYSTEMS:  Review of Systems - Negative except   ENT ROS: negative for - headaches, hearing change, nasal congestion, oral lesions, tinnitus, visual changes or   Respiratory ROS: no cough, shortness of breath, or wheezing  Cardiovascular ROS: no chest pain or dyspnea on exertion  Gastrointestinal ROS: no abdominal pain, change in bowel habits, or black or blood  Genito-Urinary ROS: no dysuria, trouble voiding, or hematuria  Musculoskeletal ROS: negative  Neurological ROS: no TIA or stroke symptoms      Social History     Socioeconomic History    Marital status: LEGALLY      Spouse name: Not on file    Number of children: 3    Years of education: Not on file    Highest education level: Not on file   Occupational History    Occupation: retired cna    Occupation: Current  [employer] Use    Smoking status: Never Smoker    Smokeless tobacco: Never Used   Substance and Sexual Activity    Alcohol use: No    Drug use: No    Sexual activity: Not Currently   r  Family History   Problem Relation Age of Onset    Cancer Sister 39        colon ca       OBJECTIVE:  Visit Vitals  /76   Pulse 73   Temp 98.3 °F (36.8 °C)   Resp 16   Ht 5' 3\" (1.6 m)   Wt 109 lb 14.4 oz (49.9 kg)   SpO2 96%   BMI 19.47 kg/m²     ENT: perrla,  eom intact  NECK: supple. Thyroid normal, no JVD  CHEST: clear to ascultation and percussion   HEART: regular rate and rhythm  ABD: soft, bowel sounds active,   EXTREMITIES: no edema, pulse 1+  INTEGUMENT: clear      ASSESSMENT:  1. Weight loss    2. Essential hypertension        PLAN:    1. Currently she has no pathological weight loss. 2. Her blood pressure is excellent today. Systolics are in the 913 range both sitting and standing. I will make no adjustments. Follow-up and Dispositions    · Return in about 6 months (around 8/25/2020).            Gemma Mercer MD

## 2020-02-25 NOTE — PROGRESS NOTES
Chief Complaint   Patient presents with    Thyroid Problem     Patient is here for follow up. 1. Have you been to the ER, urgent care clinic since your last visit? Hospitalized since your last visit? No    2. Have you seen or consulted any other health care providers outside of the [address] [place] [address] since your last visit? Include any pap smears or colon screening.  No

## 2020-03-16 DIAGNOSIS — I10 ESSENTIAL HYPERTENSION: ICD-10-CM

## 2020-03-16 RX ORDER — AMLODIPINE BESYLATE 5 MG/1
5 TABLET ORAL DAILY
Qty: 90 TAB | Refills: 3 | Status: SHIPPED | OUTPATIENT
Start: 2020-03-16 | End: 2020-10-28 | Stop reason: SDUPTHER

## 2020-04-08 ENCOUNTER — APPOINTMENT (OUTPATIENT)
Dept: GENERAL RADIOLOGY | Age: 70
DRG: 871 | End: 2020-04-08
Attending: EMERGENCY MEDICINE
Payer: MEDICARE

## 2020-04-08 ENCOUNTER — HOSPITAL ENCOUNTER (INPATIENT)
Age: 70
LOS: 5 days | Discharge: HOME | DRG: 871 | End: 2020-04-13
Attending: EMERGENCY MEDICINE | Admitting: INTERNAL MEDICINE
Payer: MEDICARE

## 2020-04-08 DIAGNOSIS — Z20.828 EXPOSURE TO SARS-ASSOCIATED CORONAVIRUS: ICD-10-CM

## 2020-04-08 DIAGNOSIS — J18.9 COMMUNITY ACQUIRED PNEUMONIA OF LEFT LUNG, UNSPECIFIED PART OF LUNG: ICD-10-CM

## 2020-04-08 DIAGNOSIS — J96.01 ACUTE RESPIRATORY FAILURE WITH HYPOXIA (HCC): Primary | ICD-10-CM

## 2020-04-08 PROBLEM — U07.1 COVID-19: Status: ACTIVE | Noted: 2020-04-08

## 2020-04-08 LAB
ALBUMIN SERPL-MCNC: 3.4 G/DL (ref 3.5–5)
ALBUMIN/GLOB SERPL: 0.7 {RATIO} (ref 1.1–2.2)
ALP SERPL-CCNC: 56 U/L (ref 45–117)
ALT SERPL-CCNC: 32 U/L (ref 12–78)
ANION GAP SERPL CALC-SCNC: 7 MMOL/L (ref 5–15)
APPEARANCE UR: ABNORMAL
AST SERPL-CCNC: 52 U/L (ref 15–37)
ATRIAL RATE: 99 BPM
BACTERIA URNS QL MICRO: NEGATIVE /HPF
BASOPHILS # BLD: 0 K/UL (ref 0–0.1)
BASOPHILS NFR BLD: 0 % (ref 0–1)
BILIRUB SERPL-MCNC: 0.9 MG/DL (ref 0.2–1)
BILIRUB UR QL: NEGATIVE
BUN SERPL-MCNC: 16 MG/DL (ref 6–20)
BUN/CREAT SERPL: 11 (ref 12–20)
CALCIUM SERPL-MCNC: 8.7 MG/DL (ref 8.5–10.1)
CALCULATED P AXIS, ECG09: 79 DEGREES
CALCULATED R AXIS, ECG10: 81 DEGREES
CALCULATED T AXIS, ECG11: 17 DEGREES
CHLORIDE SERPL-SCNC: 106 MMOL/L (ref 97–108)
CO2 SERPL-SCNC: 24 MMOL/L (ref 21–32)
COLOR UR: ABNORMAL
COMMENT, HOLDF: NORMAL
CREAT SERPL-MCNC: 1.41 MG/DL (ref 0.55–1.02)
D DIMER PPP FEU-MCNC: 1.33 MG/L FEU (ref 0–0.65)
DIAGNOSIS, 93000: NORMAL
DIFFERENTIAL METHOD BLD: ABNORMAL
EOSINOPHIL # BLD: 0 K/UL (ref 0–0.4)
EOSINOPHIL NFR BLD: 0 % (ref 0–7)
EPITH CASTS URNS QL MICRO: ABNORMAL /LPF
ERYTHROCYTE [DISTWIDTH] IN BLOOD BY AUTOMATED COUNT: 12.5 % (ref 11.5–14.5)
GLOBULIN SER CALC-MCNC: 4.6 G/DL (ref 2–4)
GLUCOSE SERPL-MCNC: 160 MG/DL (ref 65–100)
GLUCOSE UR STRIP.AUTO-MCNC: NEGATIVE MG/DL
GRAN CASTS URNS QL MICRO: ABNORMAL /LPF
HCT VFR BLD AUTO: 33.3 % (ref 35–47)
HGB BLD-MCNC: 11.5 G/DL (ref 11.5–16)
HGB UR QL STRIP: NEGATIVE
IMM GRANULOCYTES # BLD AUTO: 0 K/UL (ref 0–0.04)
IMM GRANULOCYTES NFR BLD AUTO: 1 % (ref 0–0.5)
INR PPP: 1 (ref 0.9–1.1)
KETONES UR QL STRIP.AUTO: NEGATIVE MG/DL
LACTATE SERPL-SCNC: 0.9 MMOL/L (ref 0.4–2)
LACTATE SERPL-SCNC: 1.5 MMOL/L (ref 0.4–2)
LEUKOCYTE ESTERASE UR QL STRIP.AUTO: NEGATIVE
LYMPHOCYTES # BLD: 1 K/UL (ref 0.8–3.5)
LYMPHOCYTES NFR BLD: 12 % (ref 12–49)
MCH RBC QN AUTO: 31.7 PG (ref 26–34)
MCHC RBC AUTO-ENTMCNC: 34.5 G/DL (ref 30–36.5)
MCV RBC AUTO: 91.7 FL (ref 80–99)
MONOCYTES # BLD: 0.5 K/UL (ref 0–1)
MONOCYTES NFR BLD: 7 % (ref 5–13)
NEUTS SEG # BLD: 6.6 K/UL (ref 1.8–8)
NEUTS SEG NFR BLD: 80 % (ref 32–75)
NITRITE UR QL STRIP.AUTO: NEGATIVE
NRBC # BLD: 0 K/UL (ref 0–0.01)
NRBC BLD-RTO: 0 PER 100 WBC
P-R INTERVAL, ECG05: 138 MS
PH UR STRIP: 5.5 [PH] (ref 5–8)
PLATELET # BLD AUTO: 235 K/UL (ref 150–400)
PMV BLD AUTO: 10.2 FL (ref 8.9–12.9)
POTASSIUM SERPL-SCNC: 3.4 MMOL/L (ref 3.5–5.1)
PROT SERPL-MCNC: 8 G/DL (ref 6.4–8.2)
PROT UR STRIP-MCNC: 100 MG/DL
PROTHROMBIN TIME: 10.6 SEC (ref 9–11.1)
Q-T INTERVAL, ECG07: 314 MS
QRS DURATION, ECG06: 64 MS
QTC CALCULATION (BEZET), ECG08: 402 MS
RBC # BLD AUTO: 3.63 M/UL (ref 3.8–5.2)
RBC #/AREA URNS HPF: ABNORMAL /HPF (ref 0–5)
SAMPLES BEING HELD,HOLD: NORMAL
SODIUM SERPL-SCNC: 137 MMOL/L (ref 136–145)
SP GR UR REFRACTOMETRY: 1.01 (ref 1–1.03)
TROPONIN I SERPL-MCNC: 0.09 NG/ML
UA: UC IF INDICATED,UAUC: ABNORMAL
UROBILINOGEN UR QL STRIP.AUTO: 1 EU/DL (ref 0.2–1)
VENTRICULAR RATE, ECG03: 99 BPM
WBC # BLD AUTO: 8.2 K/UL (ref 3.6–11)
WBC URNS QL MICRO: ABNORMAL /HPF (ref 0–4)

## 2020-04-08 PROCEDURE — 96374 THER/PROPH/DIAG INJ IV PUSH: CPT

## 2020-04-08 PROCEDURE — 80053 COMPREHEN METABOLIC PANEL: CPT

## 2020-04-08 PROCEDURE — 74011250636 HC RX REV CODE- 250/636: Performed by: EMERGENCY MEDICINE

## 2020-04-08 PROCEDURE — 65660000001 HC RM ICU INTERMED STEPDOWN

## 2020-04-08 PROCEDURE — 0100U RESPIRATORY PANEL,PCR,NASOPHARYNGEAL: CPT

## 2020-04-08 PROCEDURE — 85610 PROTHROMBIN TIME: CPT

## 2020-04-08 PROCEDURE — 83605 ASSAY OF LACTIC ACID: CPT

## 2020-04-08 PROCEDURE — 83036 HEMOGLOBIN GLYCOSYLATED A1C: CPT

## 2020-04-08 PROCEDURE — 74011250636 HC RX REV CODE- 250/636: Performed by: INTERNAL MEDICINE

## 2020-04-08 PROCEDURE — 82728 ASSAY OF FERRITIN: CPT

## 2020-04-08 PROCEDURE — 86140 C-REACTIVE PROTEIN: CPT

## 2020-04-08 PROCEDURE — 83615 LACTATE (LD) (LDH) ENZYME: CPT

## 2020-04-08 PROCEDURE — 85379 FIBRIN DEGRADATION QUANT: CPT

## 2020-04-08 PROCEDURE — 36415 COLL VENOUS BLD VENIPUNCTURE: CPT

## 2020-04-08 PROCEDURE — 84145 PROCALCITONIN (PCT): CPT

## 2020-04-08 PROCEDURE — 84484 ASSAY OF TROPONIN QUANT: CPT

## 2020-04-08 PROCEDURE — 74011000258 HC RX REV CODE- 258: Performed by: EMERGENCY MEDICINE

## 2020-04-08 PROCEDURE — 71045 X-RAY EXAM CHEST 1 VIEW: CPT

## 2020-04-08 PROCEDURE — 87040 BLOOD CULTURE FOR BACTERIA: CPT

## 2020-04-08 PROCEDURE — 81001 URINALYSIS AUTO W/SCOPE: CPT

## 2020-04-08 PROCEDURE — 87449 NOS EACH ORGANISM AG IA: CPT

## 2020-04-08 PROCEDURE — 99285 EMERGENCY DEPT VISIT HI MDM: CPT

## 2020-04-08 PROCEDURE — 87899 AGENT NOS ASSAY W/OPTIC: CPT

## 2020-04-08 PROCEDURE — 74011250637 HC RX REV CODE- 250/637: Performed by: INTERNAL MEDICINE

## 2020-04-08 PROCEDURE — 93005 ELECTROCARDIOGRAM TRACING: CPT

## 2020-04-08 PROCEDURE — 85025 COMPLETE CBC W/AUTO DIFF WBC: CPT

## 2020-04-08 RX ORDER — ONDANSETRON 2 MG/ML
4 INJECTION INTRAMUSCULAR; INTRAVENOUS
Status: DISCONTINUED | OUTPATIENT
Start: 2020-04-08 | End: 2020-04-13 | Stop reason: HOSPADM

## 2020-04-08 RX ORDER — SODIUM CHLORIDE 0.9 % (FLUSH) 0.9 %
5-40 SYRINGE (ML) INJECTION AS NEEDED
Status: DISCONTINUED | OUTPATIENT
Start: 2020-04-08 | End: 2020-04-13 | Stop reason: HOSPADM

## 2020-04-08 RX ORDER — HEPARIN SODIUM 5000 [USP'U]/ML
5000 INJECTION, SOLUTION INTRAVENOUS; SUBCUTANEOUS EVERY 12 HOURS
Status: DISCONTINUED | OUTPATIENT
Start: 2020-04-08 | End: 2020-04-13 | Stop reason: HOSPADM

## 2020-04-08 RX ORDER — SODIUM CHLORIDE 9 MG/ML
50 INJECTION, SOLUTION INTRAVENOUS CONTINUOUS
Status: DISCONTINUED | OUTPATIENT
Start: 2020-04-08 | End: 2020-04-11

## 2020-04-08 RX ORDER — ZINC SULFATE 50(220)MG
1 CAPSULE ORAL DAILY
Status: COMPLETED | OUTPATIENT
Start: 2020-04-09 | End: 2020-04-13

## 2020-04-08 RX ORDER — SODIUM CHLORIDE 0.9 % (FLUSH) 0.9 %
5-40 SYRINGE (ML) INJECTION EVERY 8 HOURS
Status: DISCONTINUED | OUTPATIENT
Start: 2020-04-08 | End: 2020-04-13 | Stop reason: HOSPADM

## 2020-04-08 RX ORDER — INSULIN LISPRO 100 [IU]/ML
INJECTION, SOLUTION INTRAVENOUS; SUBCUTANEOUS
Status: DISCONTINUED | OUTPATIENT
Start: 2020-04-08 | End: 2020-04-11

## 2020-04-08 RX ORDER — HYDROXYCHLOROQUINE SULFATE 200 MG/1
400 TABLET, FILM COATED ORAL EVERY 12 HOURS
Status: COMPLETED | OUTPATIENT
Start: 2020-04-08 | End: 2020-04-09

## 2020-04-08 RX ORDER — ACETAMINOPHEN 650 MG/1
650 SUPPOSITORY RECTAL
Status: DISCONTINUED | OUTPATIENT
Start: 2020-04-08 | End: 2020-04-13 | Stop reason: HOSPADM

## 2020-04-08 RX ORDER — ENOXAPARIN SODIUM 100 MG/ML
40 INJECTION SUBCUTANEOUS EVERY 24 HOURS
Status: DISCONTINUED | OUTPATIENT
Start: 2020-04-08 | End: 2020-04-08 | Stop reason: SDUPTHER

## 2020-04-08 RX ORDER — ACETAMINOPHEN 325 MG/1
650 TABLET ORAL
Status: DISCONTINUED | OUTPATIENT
Start: 2020-04-08 | End: 2020-04-13 | Stop reason: HOSPADM

## 2020-04-08 RX ORDER — SODIUM CHLORIDE 0.9 % (FLUSH) 0.9 %
5-10 SYRINGE (ML) INJECTION AS NEEDED
Status: DISCONTINUED | OUTPATIENT
Start: 2020-04-08 | End: 2020-04-13 | Stop reason: HOSPADM

## 2020-04-08 RX ORDER — HYDROXYCHLOROQUINE SULFATE 200 MG/1
200 TABLET, FILM COATED ORAL EVERY 12 HOURS
Status: COMPLETED | OUTPATIENT
Start: 2020-04-09 | End: 2020-04-13

## 2020-04-08 RX ORDER — DEXTROSE 50 % IN WATER (D50W) INTRAVENOUS SYRINGE
25-50 AS NEEDED
Status: DISCONTINUED | OUTPATIENT
Start: 2020-04-08 | End: 2020-04-13 | Stop reason: HOSPADM

## 2020-04-08 RX ORDER — GUAIFENESIN 600 MG/1
600 TABLET, EXTENDED RELEASE ORAL EVERY 12 HOURS
Status: DISCONTINUED | OUTPATIENT
Start: 2020-04-08 | End: 2020-04-13 | Stop reason: HOSPADM

## 2020-04-08 RX ORDER — ASCORBIC ACID 500 MG
500 TABLET ORAL 2 TIMES DAILY
Status: DISCONTINUED | OUTPATIENT
Start: 2020-04-09 | End: 2020-04-13 | Stop reason: HOSPADM

## 2020-04-08 RX ORDER — MAGNESIUM SULFATE 100 %
4 CRYSTALS MISCELLANEOUS AS NEEDED
Status: DISCONTINUED | OUTPATIENT
Start: 2020-04-08 | End: 2020-04-13 | Stop reason: HOSPADM

## 2020-04-08 RX ORDER — HYDRALAZINE HYDROCHLORIDE 20 MG/ML
10 INJECTION INTRAMUSCULAR; INTRAVENOUS
Status: DISCONTINUED | OUTPATIENT
Start: 2020-04-08 | End: 2020-04-13 | Stop reason: HOSPADM

## 2020-04-08 RX ORDER — ONDANSETRON 2 MG/ML
4 INJECTION INTRAMUSCULAR; INTRAVENOUS
Status: COMPLETED | OUTPATIENT
Start: 2020-04-08 | End: 2020-04-08

## 2020-04-08 RX ADMIN — SODIUM CHLORIDE 1000 ML: 900 INJECTION, SOLUTION INTRAVENOUS at 15:02

## 2020-04-08 RX ADMIN — AZITHROMYCIN MONOHYDRATE 500 MG: 500 INJECTION, POWDER, LYOPHILIZED, FOR SOLUTION INTRAVENOUS at 22:22

## 2020-04-08 RX ADMIN — HEPARIN SODIUM 5000 UNITS: 5000 INJECTION INTRAVENOUS; SUBCUTANEOUS at 22:31

## 2020-04-08 RX ADMIN — CEFTRIAXONE SODIUM 2 G: 2 INJECTION, POWDER, FOR SOLUTION INTRAMUSCULAR; INTRAVENOUS at 22:17

## 2020-04-08 RX ADMIN — GUAIFENESIN 600 MG: 600 TABLET, EXTENDED RELEASE ORAL at 22:29

## 2020-04-08 RX ADMIN — SODIUM CHLORIDE 100 ML/HR: 900 INJECTION, SOLUTION INTRAVENOUS at 22:33

## 2020-04-08 RX ADMIN — ONDANSETRON 4 MG: 2 INJECTION INTRAMUSCULAR; INTRAVENOUS at 15:05

## 2020-04-08 RX ADMIN — SODIUM CHLORIDE 1000 ML: 900 INJECTION, SOLUTION INTRAVENOUS at 15:53

## 2020-04-08 RX ADMIN — HYDROXYCHLOROQUINE SULFATE 400 MG: 200 TABLET ORAL at 22:29

## 2020-04-08 NOTE — PROGRESS NOTES
Nurse Clarification of the Prior to Admission Medication Regimen     The patient was NOT interviewed regarding clarification of the prior to admission medication regimen because unable to reach patient via phone. Tanmay Farris Spoke with pharmacist at pharmacy listed in patient's chart. The individual(s) listed above were questioned regarding the patient's use of any other inhalers, topical products, over the counter medications, herbal medications, vitamin products or ophthalmic/nasal/otic medication use. Information Obtained From: Pharmacist    Recommendations/Findings: The following amendments were made to the patient's active medication list on file at [facility]:     1) Additions:    None    2) Removals:    Albuterol 90mcg/actuation inhaler    3) Changes:   None       PTA medication list was corrected to the following:     Prior to Admission Medications   Prescriptions Last Dose Informant Taking? amLODIPine (NORVASC) 5 mg tablet Unknown at Unknown time  No   Sig: Take 1 Tab by mouth daily.       Facility-Administered Medications: None        Thank you,  Devendra Pang RN

## 2020-04-08 NOTE — ED PROVIDER NOTES
EMERGENCY DEPARTMENT HISTORY AND PHYSICAL EXAM      Date: 4/8/2020  Patient Name: Tiffany Elam    History of Presenting Illness     Chief Complaint   Patient presents with    Fatigue     Ambulatory into the ED with c/o \"low oxygen\" when she was swabbed at a Covid clinic 2 days ago. Also c/o generalized weakness and vomiting, but denies respiratory sx. She was recently in contact with her adughter, who tested positive for Covid-19       History Provided By: Patient    HPI: Tiffany Elam, 72 y.o. female of hypertension presents to the ED with cc of fatigue and generalized weakness. She states that over the past 3 to 4 days she has had worsening generalized weakness, fatigue. She does have nausea and vomiting. She has had poor p.o. intake over the past few days. Denies any diarrhea. Denies any abdominal pain. She does endorse cough and she was sent home from work where she works at [employer] for fever this past Friday. She does not live with anybody who has been sick but does report being around her daughter who has tested positive for COVID19 before she developed symptoms she states that she was tested but has not learned the results of this test.    There are no other complaints, changes, or physical findings at this time. PCP: Michael Singh MD    No current facility-administered medications on file prior to encounter. Current Outpatient Medications on File Prior to Encounter   Medication Sig Dispense Refill    amLODIPine (NORVASC) 5 mg tablet Take 1 Tab by mouth daily. 90 Tab 3    [DISCONTINUED] albuterol (PROVENTIL HFA, VENTOLIN HFA, PROAIR HFA) 90 mcg/actuation inhaler Take 2 Puffs by inhalation every four (4) hours as needed for Wheezing.  1 Inhaler 1       Past History     Past Medical History:  Past Medical History:   Diagnosis Date    Pneumonia 04/01/2010       Past Surgical History:  Past Surgical History:   Procedure Laterality Date    COLONOSCOPY N/A 5/23/2016 COLONOSCOPY performed by Marquez Carrion MD at [address] HX BREAST BIOPSY Right     Benign. Patient thinks biopsy was on rt breast lower areolar area. Along time ago.  HX GI      colonscopy - about 2011    HX GYN      hysterectomy    HX HYSTERECTOMY      HX ORTHOPAEDIC      surgery for broken foot - right       Family History:  Family History   Problem Relation Age of Onset    Cancer Sister 39        colon ca       Social History:  Social History     Tobacco Use    Smoking status: Never Smoker    Smokeless tobacco: Never Used   Substance Use Topics    Alcohol use: No    Drug use: No       Allergies:  No Known Allergies      Review of Systems   Review of Systems   Constitutional: Positive for activity change, appetite change, chills, fatigue and fever. Eyes: Negative for visual disturbance. Respiratory: Positive for cough. Negative for shortness of breath. Cardiovascular: Negative for chest pain and leg swelling. Gastrointestinal: Positive for nausea and vomiting. Negative for abdominal pain and diarrhea. Genitourinary: Negative. Musculoskeletal: Negative for back pain and gait problem. Skin: Negative for color change and rash. Neurological: Positive for light-headedness. Negative for dizziness, weakness and headaches. Hematological: Does not bruise/bleed easily. All other systems reviewed and are negative. Physical Exam   Physical Exam  Vitals signs and nursing note reviewed. Constitutional:       General: She is not in acute distress. Appearance: Normal appearance. She is not ill-appearing, toxic-appearing or diaphoretic. HENT:      Head: Normocephalic and atraumatic. Mouth/Throat:      Mouth: Mucous membranes are dry. Neck:      Musculoskeletal: Normal range of motion and neck supple. No muscular tenderness. Cardiovascular:      Rate and Rhythm: Regular rhythm. Tachycardia present. Heart sounds: Normal heart sounds. No murmur.    Pulmonary: Effort: Pulmonary effort is normal. No respiratory distress. Breath sounds: Normal breath sounds. No wheezing. Abdominal:      Palpations: Abdomen is soft. Tenderness: There is no abdominal tenderness. There is no guarding or rebound. Musculoskeletal: Normal range of motion. Right lower leg: No edema. Left lower leg: No edema. Skin:     General: Skin is warm and dry. Findings: No erythema or rash. Neurological:      General: No focal deficit present. Mental Status: She is alert and oriented to person, place, and time. Diagnostic Study Results     Labs -     Recent Results (from the past 12 hour(s))   EKG, 12 LEAD, INITIAL    Collection Time: 04/08/20  2:09 PM   Result Value Ref Range    Ventricular Rate 99 BPM    Atrial Rate 99 BPM    P-R Interval 138 ms    QRS Duration 64 ms    Q-T Interval 314 ms    QTC Calculation (Bezet) 402 ms    Calculated P Axis 79 degrees    Calculated R Axis 81 degrees    Calculated T Axis 17 degrees    Diagnosis       Sinus rhythm with premature atrial complexes  Nonspecific T wave abnormality  Confirmed by Emily Benson ([id number]) on 4/8/2020 7:14:54 PM     CBC WITH AUTOMATED DIFF    Collection Time: 04/08/20  2:21 PM   Result Value Ref Range    WBC 8.2 3.6 - 11.0 K/uL    RBC 3.63 (L) 3.80 - 5.20 M/uL    HGB 11.5 11.5 - 16.0 g/dL    HCT 33.3 (L) 35.0 - 47.0 %    MCV 91.7 80.0 - 99.0 FL    MCH 31.7 26.0 - 34.0 PG    MCHC 34.5 30.0 - 36.5 g/dL    RDW 12.5 11.5 - 14.5 %    PLATELET 118 818 - 420 K/uL    MPV 10.2 8.9 - 12.9 FL    NRBC 0.0 0  WBC    ABSOLUTE NRBC 0.00 0.00 - 0.01 K/uL    NEUTROPHILS 80 (H) 32 - 75 %    LYMPHOCYTES 12 12 - 49 %    MONOCYTES 7 5 - 13 %    EOSINOPHILS 0 0 - 7 %    BASOPHILS 0 0 - 1 %    IMMATURE GRANULOCYTES 1 (H) 0.0 - 0.5 %    ABS. NEUTROPHILS 6.6 1.8 - 8.0 K/UL    ABS. LYMPHOCYTES 1.0 0.8 - 3.5 K/UL    ABS. MONOCYTES 0.5 0.0 - 1.0 K/UL    ABS. EOSINOPHILS 0.0 0.0 - 0.4 K/UL    ABS.  BASOPHILS 0.0 0.0 - 0.1 K/UL    ABS. IMM. GRANS. 0.0 0.00 - 0.04 K/UL    DF AUTOMATED     METABOLIC PANEL, COMPREHENSIVE    Collection Time: 04/08/20  2:21 PM   Result Value Ref Range    Sodium 137 136 - 145 mmol/L    Potassium 3.4 (L) 3.5 - 5.1 mmol/L    Chloride 106 97 - 108 mmol/L    CO2 24 21 - 32 mmol/L    Anion gap 7 5 - 15 mmol/L    Glucose 160 (H) 65 - 100 mg/dL    BUN 16 6 - 20 MG/DL    Creatinine 1.41 (H) 0.55 - 1.02 MG/DL    BUN/Creatinine ratio 11 (L) 12 - 20      GFR est AA 45 (L) >60 ml/min/1.73m2    GFR est non-AA 37 (L) >60 ml/min/1.73m2    Calcium 8.7 8.5 - 10.1 MG/DL    Bilirubin, total 0.9 0.2 - 1.0 MG/DL    ALT (SGPT) 32 12 - 78 U/L    AST (SGOT) 52 (H) 15 - 37 U/L    Alk. phosphatase 56 45 - 117 U/L    Protein, total 8.0 6.4 - 8.2 g/dL    Albumin 3.4 (L) 3.5 - 5.0 g/dL    Globulin 4.6 (H) 2.0 - 4.0 g/dL    A-G Ratio 0.7 (L) 1.1 - 2.2     SAMPLES BEING HELD    Collection Time: 04/08/20  2:21 PM   Result Value Ref Range    SAMPLES BEING HELD  1 BLUE, 1 RED, 1 SST     COMMENT        Add-on orders for these samples will be processed based on acceptable specimen integrity and analyte stability, which may vary by analyte.    LACTIC ACID    Collection Time: 04/08/20  2:21 PM   Result Value Ref Range    Lactic acid 1.5 0.4 - 2.0 MMOL/L   URINALYSIS W/ REFLEX CULTURE    Collection Time: 04/08/20  2:22 PM   Result Value Ref Range    Color DARK YELLOW      Appearance CLOUDY (A) CLEAR      Specific gravity 1.015 1.003 - 1.030      pH (UA) 5.5 5.0 - 8.0      Protein 100 (A) NEG mg/dL    Glucose Negative NEG mg/dL    Ketone Negative NEG mg/dL    Bilirubin Negative NEG      Blood Negative NEG      Urobilinogen 1.0 0.2 - 1.0 EU/dL    Nitrites Negative NEG      Leukocyte Esterase Negative NEG      WBC 0-4 0 - 4 /hpf    RBC 0-5 0 - 5 /hpf    Epithelial cells FEW FEW /lpf    Bacteria Negative NEG /hpf    UA:UC IF INDICATED CULTURE NOT INDICATED BY UA RESULT CNI      Granular cast 0-2 (A) NEG /lpf       Radiologic Studies -   XR CHEST PORT   Final Result   IMPRESSION:   1. Minimal, nonspecific opacity in the left midlung and left base as well as   interstitial prominence which can be seen with viral pneumonia. Recommend   clinical management. CT Results  (Last 48 hours)    None        CXR Results  (Last 48 hours)               04/08/20 1532  XR CHEST PORT Final result    Impression:  IMPRESSION:   1. Minimal, nonspecific opacity in the left midlung and left base as well as   interstitial prominence which can be seen with viral pneumonia. Recommend   clinical management. Narrative:  INDICATION: . SOB   Additional history: Dyspnea   COMPARISON: Previous chest xray, 11/30/2017 and 1/24/2019. LIMITATIONS: Portable technique. [place] FINDINGS: Single frontal view of the chest.    .   Lines/tubes/surgical: None. Heart/mediastinum: Unremarkable. Lungs/pleura: Minimal opacity in the left midlung and left base. Slight   interstitial prominence. Additional Comments: None. .             Medical Decision Making   I am the first provider for this patient. I reviewed the vital signs, available nursing notes, past medical history, past surgical history, family history and social history. Vital Signs-Reviewed the patient's vital signs.   Patient Vitals for the past 12 hrs:   Temp Pulse Resp BP SpO2   04/08/20 1836  77  100/53 96 %   04/08/20 1800  82 21 100/57 95 %   04/08/20 1630  100 21 112/71 (!) 87 %   04/08/20 1615  84 25 110/63 (!) 87 %   04/08/20 1600  100 23 122/66 (!) 82 %   04/08/20 1545  93 23 125/67 (!) 85 %   04/08/20 1533     100 %   04/08/20 1530  91 20 113/77 90 %   04/08/20 1515  88 22 112/64 100 %   04/08/20 1500  96 24 102/65 96 %   04/08/20 1457     96 %   04/08/20 1445     92 %   04/08/20 1445  (!) 109 24 115/67 (!) 89 %   04/08/20 1438  (!) 102 12  99 %   04/08/20 1437    118/67    04/08/20 1428  95 18  95 %   04/08/20 1401 99.4 °F (37.4 °C) (!) 118 16 118/76 (!) 86 %       Records Reviewed: Nursing Notes and Old Medical Records    Provider Notes (Medical Decision Making):   43-year-old female here with cough, febrile illness, and noted to be hypoxic 80 to 86% with ambulation. Concern for viral pneumonia such as COVID19 after chest x-ray infiltration. No leukocytosis. Urinalysis unremarkable. She does have mild NAZARIO and will be administered IV fluids. Rocephin and azithromycin will be administered, blood cultures obtained. Lactic acid within normal limits. Patient is unable to ambulate without becoming extremely hypoxic and will require admission for this. Will obtain Sars CoV2 swab and plan for admission. ED Course:   Initial assessment performed. The patients presenting problems have been discussed, and they are in agreement with the care plan formulated and outlined with them. I have encouraged them to ask questions as they arise throughout their visit. ED Course as of Apr 08 2208 Wed Apr 08, 2020   1415 EKG per my interpretation normal sinus rhythm, rate 99 bpm, normal axis, no acute ischemic changes. [AK]      ED Course User Index  [AK] Precious Mejia MD       Admission Note:  Patient is being admitted to the hospital by Dr. Kike Lopez, Service: Hospitalist.  The results of their tests and reasons for their admission have been discussed with them and available family. They convey agreement and understanding for the need to be admitted and for their admission diagnosis. Disposition:  Admit      Diagnosis     Clinical Impression:   1. Acute respiratory failure with hypoxia ([address]    2. Exposure to SARS-associated coronavirus    3. Community acquired pneumonia of left lung, unspecified part of lung        Attestations:    Librado Loen MD    Please note that this dictation was completed with Lovely, the [employer] voice recognition software.   Quite often unanticipated grammatical, syntax, homophones, and other interpretive errors are inadvertently transcribed by the computer software. Please disregard these errors. Please excuse any errors that have escaped final proofreading. Thank you.

## 2020-04-08 NOTE — ED NOTES
Pt placed on 2L O2 via NSAO.   Pt was sating on room air at 86% and when placed on NASO went up to 96%

## 2020-04-08 NOTE — ED NOTES
Pt amubulated 100 ft. Pt denied SOB or any discomfort. HR remained in between 90 and 110. O2 went from 92% on RA to 80% while ambulating. Pt had a cough. Pt placed on 2L O2 via NASO. Pt in bed resting with call bell in reach and bed in lowest position.   AxOx4

## 2020-04-09 LAB
ALBUMIN SERPL-MCNC: 2.6 G/DL (ref 3.5–5)
ALBUMIN/GLOB SERPL: 0.7 {RATIO} (ref 1.1–2.2)
ALP SERPL-CCNC: 44 U/L (ref 45–117)
ALT SERPL-CCNC: 26 U/L (ref 12–78)
ANION GAP SERPL CALC-SCNC: 4 MMOL/L (ref 5–15)
AST SERPL-CCNC: 43 U/L (ref 15–37)
B PERT DNA SPEC QL NAA+PROBE: NOT DETECTED
BASOPHILS # BLD: 0 K/UL (ref 0–0.1)
BASOPHILS NFR BLD: 0 % (ref 0–1)
BILIRUB SERPL-MCNC: 0.5 MG/DL (ref 0.2–1)
BORDETELLA PARAPERTUSSIS PCR, BORPAR: NOT DETECTED
BUN SERPL-MCNC: 13 MG/DL (ref 6–20)
BUN/CREAT SERPL: 12 (ref 12–20)
C PNEUM DNA SPEC QL NAA+PROBE: NOT DETECTED
CALCIUM SERPL-MCNC: 7.4 MG/DL (ref 8.5–10.1)
CHLORIDE SERPL-SCNC: 115 MMOL/L (ref 97–108)
CO2 SERPL-SCNC: 23 MMOL/L (ref 21–32)
COMMENT, HOLDF: NORMAL
CREAT SERPL-MCNC: 1.06 MG/DL (ref 0.55–1.02)
CRP SERPL-MCNC: 14.4 MG/DL (ref 0–0.6)
DIFFERENTIAL METHOD BLD: ABNORMAL
EOSINOPHIL # BLD: 0 K/UL (ref 0–0.4)
EOSINOPHIL NFR BLD: 0 % (ref 0–7)
ERYTHROCYTE [DISTWIDTH] IN BLOOD BY AUTOMATED COUNT: 12.8 % (ref 11.5–14.5)
EST. AVERAGE GLUCOSE BLD GHB EST-MCNC: 100 MG/DL
FERRITIN SERPL-MCNC: 3510 NG/ML (ref 8–252)
FLUAV H1 2009 PAND RNA SPEC QL NAA+PROBE: NOT DETECTED
FLUAV H1 RNA SPEC QL NAA+PROBE: NOT DETECTED
FLUAV H3 RNA SPEC QL NAA+PROBE: NOT DETECTED
FLUAV SUBTYP SPEC NAA+PROBE: NOT DETECTED
FLUBV RNA SPEC QL NAA+PROBE: NOT DETECTED
GLOBULIN SER CALC-MCNC: 3.7 G/DL (ref 2–4)
GLUCOSE BLD STRIP.AUTO-MCNC: 121 MG/DL (ref 65–100)
GLUCOSE BLD STRIP.AUTO-MCNC: 147 MG/DL (ref 65–100)
GLUCOSE BLD STRIP.AUTO-MCNC: 89 MG/DL (ref 65–100)
GLUCOSE BLD STRIP.AUTO-MCNC: 93 MG/DL (ref 65–100)
GLUCOSE BLD STRIP.AUTO-MCNC: 99 MG/DL (ref 65–100)
GLUCOSE SERPL-MCNC: 107 MG/DL (ref 65–100)
HADV DNA SPEC QL NAA+PROBE: NOT DETECTED
HBA1C MFR BLD: 5.1 % (ref 4–5.6)
HCOV 229E RNA SPEC QL NAA+PROBE: NOT DETECTED
HCOV HKU1 RNA SPEC QL NAA+PROBE: NOT DETECTED
HCOV NL63 RNA SPEC QL NAA+PROBE: NOT DETECTED
HCOV OC43 RNA SPEC QL NAA+PROBE: NOT DETECTED
HCT VFR BLD AUTO: 29.2 % (ref 35–47)
HGB BLD-MCNC: 9.8 G/DL (ref 11.5–16)
HMPV RNA SPEC QL NAA+PROBE: NOT DETECTED
HPIV1 RNA SPEC QL NAA+PROBE: NOT DETECTED
HPIV2 RNA SPEC QL NAA+PROBE: NOT DETECTED
HPIV3 RNA SPEC QL NAA+PROBE: NOT DETECTED
HPIV4 RNA SPEC QL NAA+PROBE: NOT DETECTED
IMM GRANULOCYTES # BLD AUTO: 0.1 K/UL (ref 0–0.04)
IMM GRANULOCYTES NFR BLD AUTO: 1 % (ref 0–0.5)
LDH SERPL L TO P-CCNC: 398 U/L (ref 81–246)
LYMPHOCYTES # BLD: 0.7 K/UL (ref 0.8–3.5)
LYMPHOCYTES NFR BLD: 9 % (ref 12–49)
M PNEUMO DNA SPEC QL NAA+PROBE: NOT DETECTED
MAGNESIUM SERPL-MCNC: 1.8 MG/DL (ref 1.6–2.4)
MCH RBC QN AUTO: 31 PG (ref 26–34)
MCHC RBC AUTO-ENTMCNC: 33.6 G/DL (ref 30–36.5)
MCV RBC AUTO: 92.4 FL (ref 80–99)
MONOCYTES # BLD: 0.6 K/UL (ref 0–1)
MONOCYTES NFR BLD: 7 % (ref 5–13)
NEUTS SEG # BLD: 6.6 K/UL (ref 1.8–8)
NEUTS SEG NFR BLD: 83 % (ref 32–75)
NRBC # BLD: 0 K/UL (ref 0–0.01)
NRBC BLD-RTO: 0 PER 100 WBC
PLATELET # BLD AUTO: 217 K/UL (ref 150–400)
PMV BLD AUTO: 10.3 FL (ref 8.9–12.9)
POTASSIUM SERPL-SCNC: 3.9 MMOL/L (ref 3.5–5.1)
PROCALCITONIN SERPL-MCNC: 0.44 NG/ML
PROT SERPL-MCNC: 6.3 G/DL (ref 6.4–8.2)
RBC # BLD AUTO: 3.16 M/UL (ref 3.8–5.2)
RBC MORPH BLD: ABNORMAL
RSV RNA SPEC QL NAA+PROBE: NOT DETECTED
RV+EV RNA SPEC QL NAA+PROBE: NOT DETECTED
SAMPLES BEING HELD,HOLD: NORMAL
SERVICE CMNT-IMP: ABNORMAL
SERVICE CMNT-IMP: ABNORMAL
SERVICE CMNT-IMP: NORMAL
SODIUM SERPL-SCNC: 142 MMOL/L (ref 136–145)
WBC # BLD AUTO: 8 K/UL (ref 3.6–11)

## 2020-04-09 PROCEDURE — 87635 SARS-COV-2 COVID-19 AMP PRB: CPT

## 2020-04-09 PROCEDURE — 85025 COMPLETE CBC W/AUTO DIFF WBC: CPT

## 2020-04-09 PROCEDURE — 82962 GLUCOSE BLOOD TEST: CPT

## 2020-04-09 PROCEDURE — 36415 COLL VENOUS BLD VENIPUNCTURE: CPT

## 2020-04-09 PROCEDURE — 74011636637 HC RX REV CODE- 636/637: Performed by: INTERNAL MEDICINE

## 2020-04-09 PROCEDURE — 74011250637 HC RX REV CODE- 250/637: Performed by: INTERNAL MEDICINE

## 2020-04-09 PROCEDURE — 80053 COMPREHEN METABOLIC PANEL: CPT

## 2020-04-09 PROCEDURE — 94760 N-INVAS EAR/PLS OXIMETRY 1: CPT

## 2020-04-09 PROCEDURE — 83735 ASSAY OF MAGNESIUM: CPT

## 2020-04-09 PROCEDURE — 74011250636 HC RX REV CODE- 250/636: Performed by: INTERNAL MEDICINE

## 2020-04-09 PROCEDURE — 87070 CULTURE OTHR SPECIMN AEROBIC: CPT

## 2020-04-09 PROCEDURE — 65660000001 HC RM ICU INTERMED STEPDOWN

## 2020-04-09 PROCEDURE — 77010033678 HC OXYGEN DAILY

## 2020-04-09 PROCEDURE — 74011000258 HC RX REV CODE- 258: Performed by: INTERNAL MEDICINE

## 2020-04-09 RX ORDER — EPINEPHRINE 1 MG/ML
0.3 INJECTION, SOLUTION, CONCENTRATE INTRAVENOUS
Status: ACTIVE | OUTPATIENT
Start: 2020-04-09 | End: 2020-04-09

## 2020-04-09 RX ORDER — DIPHENHYDRAMINE HYDROCHLORIDE 50 MG/ML
50 INJECTION, SOLUTION INTRAMUSCULAR; INTRAVENOUS
Status: DISCONTINUED | OUTPATIENT
Start: 2020-04-09 | End: 2020-04-10 | Stop reason: SDUPTHER

## 2020-04-09 RX ADMIN — SODIUM CHLORIDE 100 ML/HR: 900 INJECTION, SOLUTION INTRAVENOUS at 09:37

## 2020-04-09 RX ADMIN — HEPARIN SODIUM 5000 UNITS: 5000 INJECTION INTRAVENOUS; SUBCUTANEOUS at 09:42

## 2020-04-09 RX ADMIN — OXYCODONE HYDROCHLORIDE AND ACETAMINOPHEN 500 MG: 500 TABLET ORAL at 12:28

## 2020-04-09 RX ADMIN — CEFTRIAXONE 1 G: 1 INJECTION, POWDER, FOR SOLUTION INTRAMUSCULAR; INTRAVENOUS at 21:38

## 2020-04-09 RX ADMIN — GUAIFENESIN 600 MG: 600 TABLET, EXTENDED RELEASE ORAL at 09:40

## 2020-04-09 RX ADMIN — Medication 10 ML: at 21:37

## 2020-04-09 RX ADMIN — AZITHROMYCIN MONOHYDRATE 500 MG: 500 INJECTION, POWDER, LYOPHILIZED, FOR SOLUTION INTRAVENOUS at 23:08

## 2020-04-09 RX ADMIN — HEPARIN SODIUM 5000 UNITS: 5000 INJECTION INTRAVENOUS; SUBCUTANEOUS at 21:34

## 2020-04-09 RX ADMIN — HYDROXYCHLOROQUINE SULFATE 400 MG: 200 TABLET ORAL at 09:39

## 2020-04-09 RX ADMIN — Medication 10 ML: at 16:00

## 2020-04-09 RX ADMIN — Medication 10 ML: at 05:53

## 2020-04-09 RX ADMIN — OXYCODONE HYDROCHLORIDE AND ACETAMINOPHEN 500 MG: 500 TABLET ORAL at 17:48

## 2020-04-09 RX ADMIN — INSULIN LISPRO 2 UNITS: 100 INJECTION, SOLUTION INTRAVENOUS; SUBCUTANEOUS at 12:29

## 2020-04-09 RX ADMIN — SODIUM CHLORIDE 100 ML/HR: 900 INJECTION, SOLUTION INTRAVENOUS at 19:17

## 2020-04-09 RX ADMIN — ZINC SULFATE 220 MG (50 MG) CAPSULE 1 CAPSULE: CAPSULE at 09:40

## 2020-04-09 RX ADMIN — HYDROXYCHLOROQUINE SULFATE 200 MG: 200 TABLET ORAL at 21:34

## 2020-04-09 RX ADMIN — GUAIFENESIN 600 MG: 600 TABLET, EXTENDED RELEASE ORAL at 21:34

## 2020-04-09 RX ADMIN — TOCILIZUMAB 324 MG: 180 INJECTION, SOLUTION SUBCUTANEOUS at 17:52

## 2020-04-09 NOTE — PROGRESS NOTES
Problem: Pneumonia: Day 1  Goal: Off Pathway (Use only if patient is Off Pathway)  Outcome: Progressing Towards Goal  Goal: Activity/Safety  Outcome: Progressing Towards Goal  Goal: Consults, if ordered  Outcome: Progressing Towards Goal  Goal: Diagnostic Test/Procedures  Outcome: Progressing Towards Goal  Goal: Nutrition/Diet  Outcome: Progressing Towards Goal  Goal: Medications  Outcome: Progressing Towards Goal  Goal: Respiratory  Outcome: Progressing Towards Goal  Goal: Psychosocial  Outcome: Progressing Towards Goal  Goal: *Oxygen saturation within defined limits  Outcome: Progressing Towards Goal  Goal: *Hemodynamically stable  Outcome: Progressing Towards Goal  Goal: *Demonstrates progressive activity  Outcome: Progressing Towards Goal  Goal: *Tolerating diet  Outcome: Progressing Towards Goal

## 2020-04-09 NOTE — CDMP QUERY
Good Morning, Patient admitted with Pneumonia 2/2 suspected COVID-19. It's noted documentation of RR 20-25 with RA oxygen saturation of 85% within progress note by Giovanna PORTER on 4/8. Please clarify whether or not the patient is currently being treated for any of the following within a progress note and/or discharge summary: 
 
==>Acute hypoxic Respiratory Failure POA 
==>Acute hypoxemia POA 
==>Other; Unspecified 
==>unable to determine The medical record reflects the following: 
    
Risk Factors: 72 Yr F admitted for Pneumonia 2/2 suspected COVID-19 Clinical Indicators: Patient arrived to the ED with c/o nausea/vomiting and generalized weakness with a dry cough. Recently tested outpatient without a result but daughter tested positive for COVID-19. Patient arrived sating 85-90% on RA with noted desaturation to 80% on RA with ambulation. Patient was placed on 2L O2 NC. It's noted in the documented vital signs that the patient had desaturations on 2L O2 down to 85-87% and is now currently on 3 L O2 NC sating at 92%. CXR showed Minimal, nonspecific opacity in the left midlung and left base as well as interstitial prominence which can be seen with viral pneumonia. Patient placed on IV antibiotics. Treatment: Daily CBC/BMP, CXR daily, frequent vital signs and monitoring and titration of Oxygen needs. Thank you, Yuliya Renteria [facility], . Katie 72 Clinical Indicators of Respiratory Failure: - Respirations <12 or >25 - Air hunger/gasping - Use of accessory muscles of respiration/increased work of breathing - Sternal or intercostal retractions - Stridor - Inability to speak in full sentences - Cyanosis - Pulse ox <90% RA or <95% on O2  
- pH <7.35 or >7.45  
- pO2 < 60 mm Hg (or 10mm below COPD patient's baseline) - pCO2 >50mm Hg (or 10mm above COPD patient's baseline)

## 2020-04-09 NOTE — PROGRESS NOTES
Pt ambulating to bathroom with oxygen, pt given SQ Actemra with emergency meds on standby, Vital signs checked, pt ambulated to bathroom after approximately 20 to 30minutes of injections. Pt is showing no s/sx of anaphylaxis symptoms. Pt eating about 1/2 of her meals. Denies any pain at all this shift. VSS. Pt now sitting up in chair.

## 2020-04-09 NOTE — ED NOTES
TRANSFER - OUT REPORT:    Verbal report given to [facility] RN (name) on Ilene Brooks  being transferred to [facility] (unit) for routine progression of care       Report consisted of patients Situation, Background, Assessment and   Recommendations(SBAR). Information from the following report(s) SBAR, ED Summary, [facility] and Recent Results was reviewed with the receiving nurse. Lines:   Peripheral IV 04/08/20 Left Antecubital (Active)        Opportunity for questions and clarification was provided.

## 2020-04-09 NOTE — PROGRESS NOTES
Verbal shift change report given to Willie Verdugo (oncoming nurse) by Christina Mills (offgoing nurse). Report included the following information SBAR.

## 2020-04-09 NOTE — PROGRESS NOTES
Hospitalist Progress Note    [facility]   [facility]  1950   MRN:  [id number]       Assessment / Plan:  Acute hypoxic resp failure  Severe sepsis POA  COVID -19 pna  CXR  -1. Minimal, nonspecific opacity in the left midlung and left base as well as  interstitial prominence which can be seen with viral pneumonia.   Recommend  clinical management.  -maintaining sats on 3L, no prior lung Dz, no prior home O2  -follow stress markers/  - azithromycinRocephin/Plaq/  - F/u lactate  - F/u bcx  - Guaifenesin for cough  - Tylenol for fever  - Pulmonary consult if decomp   vit c/zinc  Continue with to keep level above 92.  Patient decompensated with transfer to ICU.        Gastroenteritis: Likely related to aforementioned viral prodrome,liquid diet advance as tolerated- Zofran prn for nausea     HTN-hold medications IV fluids for now  Anemia labs for the morning will need follow-up on outpatient basis  Hyperglycemia check A1c start insulin sliding scale  Hypokalemia replace as needed check magnesium  Acute renal failure likely prerenal IV fluids recheck labs in the morning  Code Status: Full code  Surrogate Decision Maker:DTR     DVT Prophylaxis: Lovenox   GI Prophylaxis: not indicated     Baseline: ambulatory      Subjective:     Chief Complaint / Reason for Physician Visit  Presented with weakness and nausea, feeling. Little better today. No shortness of breath today or when she came in. Occasional dry cough    Discussed with RN events overnight. Review of Systems:  Symptom Y/N Comments  Symptom Y/N Comments   Fever/Chills n   Chest Pain n    Poor Appetite    Edema     Cough y   Abdominal Pain n    Sputum n   Joint Pain     SOB/LIANG n   Pruritis/Rash     Nausea/vomit y improved  Tolerating PT/OT     Diarrhea n   Tolerating Diet y    Constipation    Other       Could NOT obtain due to:      Objective:     VITALS:   Last 24hrs VS reviewed since prior progress note.  Most recent are:  Patient Vitals for the past 24 hrs:   Temp Pulse Resp BP SpO2   04/09/20 1054 98.2 °F (36.8 °C) 86 18 104/60 92 %   04/09/20 0737 98.6 °F (37 °C) 86 18 102/59 94 %   04/09/20 0329 100 °F (37.8 °C) 92 18 118/72 92 %   04/08/20 2359 100.2 °F (37.9 °C) 95 20 114/54 92 %   04/08/20 2200  84 21 116/63 99 %   04/08/20 2130  80 23 119/58 98 %   04/08/20 2100  81 21 113/62 95 %   04/08/20 2030  78 21 109/71 97 %   04/08/20 1930  80 21 109/61 95 %   04/08/20 1836  77  100/53 96 %   04/08/20 1800  82 21 100/57 95 %   04/08/20 1630  100 21 112/71 (!) 87 %   04/08/20 1615  84 25 110/63 (!) 87 %   04/08/20 1600  100 23 122/66 (!) 82 %   04/08/20 1545  93 23 125/67 (!) 85 %   04/08/20 1533     100 %   04/08/20 1530  91 20 113/77 90 %   04/08/20 1515  88 22 112/64 100 %   04/08/20 1500  96 24 102/65 96 %   04/08/20 1457     96 %   04/08/20 1445     92 %   04/08/20 1445  (!) 109 24 115/67 (!) 89 %   04/08/20 1438  (!) 102 12  99 %   04/08/20 1437    118/67    04/08/20 1428  95 18  95 %   04/08/20 1401 99.4 °F (37.4 °C) (!) 118 16 118/76 (!) 86 %       Intake/Output Summary (Last 24 hours) at 4/9/2020 1134  Last data filed at 4/9/2020 1051  Gross per 24 hour   Intake 240 ml   Output    Net 240 ml        PHYSICAL EXAM:  General: WD, WN. Alert, cooperative, no acute distress    EENT:  EOMI. Anicteric sclerae. MMM  Resp:  Diminished air entry, no rales, rhonchi. Speaks full sentences w/o distress. No accessory muscle use  CV:  Regular  rhythm,  No edema  GI:  Soft, Non distended, Non tender.  +Bowel sounds  Neurologic:  Alert and oriented X 3, normal speech,   Psych:   Good insight. Not anxious nor agitated  Skin:  No rashes.   No jaundice    Reviewed most current lab test results and cultures  YES  Reviewed most current radiology test results   YES  Review and summation of old records today    NO  Reviewed patient's current orders and MAR    YES  PMH/SH reviewed - no change compared to H&P  ________________________________________________________________________  Care Plan discussed with:    Comments   Patient x    Family      RN x    Care Manager     Consultant                        Multidiciplinary team rounds were held today with , nursing, pharmacist and clinical coordinator. Patient's plan of care was discussed; medications were reviewed and discharge planning was addressed. ________________________________________________________________________  Total NON critical care TIME:  35 Minutes    Total CRITICAL CARE TIME Spent:   Minutes non procedure based      Comments   >50% of visit spent in counseling and coordination of care x    ________________________________________________________________________  Kelly Linder DO     Procedures: see electronic medical records for all procedures/Xrays and details which were not copied into this note but were reviewed prior to creation of Plan. LABS:  I reviewed today's most current labs and imaging studies.   Pertinent labs include:  Recent Labs     04/09/20  0001 04/08/20  1421   WBC 8.0 8.2   HGB 9.8* 11.5   HCT 29.2* 33.3*    235     Recent Labs     04/09/20  0001 04/08/20  2237 04/08/20  1421     --  137   K 3.9  --  3.4*   *  --  106   CO2 23  --  24   *  --  160*   BUN 13  --  16   CREA 1.06*  --  1.41*   CA 7.4*  --  8.7   MG 1.8  --   --    ALB 2.6*  --  3.4*   TBILI 0.5  --  0.9   SGOT 43*  --  52*   ALT 26  --  32   INR  --  1.0  --        Signed: Kelly Linder DO

## 2020-04-09 NOTE — CDMP QUERY
Good Morning, The patient is admitted for Pneumonia 2/2 suspected COVID-19. There is documentation of Acute Renal Scottie Govern is noted in the patient's H&P. Currently the patient does not meet RIFLE criteria (BSV approved) to support this diagnosis. If you are using another criteria to support this diagnosis, please document this in your progress note. Otherwise, please document in the progress notes the clinical indicators that support this diagnosis or state that the diagnosis has been ruled out. Please clarify whether ARF has been ruled in/out within a progress note and/or discharge summary: 
 
==>Acute Renal Failure Ruled Out POA 
==>Acute Renal Failure Ruled In POA (provide clinical indicators within progress note) 
==>Acute Renal Insufficiency Ruled In POA 
==>other; Unspecified 
==>Unable to determine Risk Factors: Patient admitted for Pneumonia 2/2 suspected COVID-19 Clinical Indicators:Patient arrived to the ED with c/o nausea/vomiting and generalized weakness with a dry cough. Recently tested outpatient without a result but daughter tested positive for COVID-19. Patient's renal function in the ED revealed a BUN 16 Creatinine 1.41 GFR 45. On today's labs patient's BUN 13 Creatinine 1.06 with a GFR >60. Patient has no history of CKD noted within the medical record. Treatment:Daily CBC/BMP, 2,000 ML NS IVF bolus and NS IVF at 100 ML/HR. Thank you, Niyah Horan [facility], Margaret Tran RIFLE  (BSV Approved) RISK:  Increased SCr x 1.5 or GFR decrease > 25% (within 7 days) INJURY:  Increased SCr x 2.0 or GFR decreased > 50% FAILURE:  Increased SCr x 3.0 or GFR decrease > 75% or SCr >4.0 mg/dL or acute increase >0.5 mg/dL LOSS:  Persistent acute renal failure = complete loss of kidney function > 4 weeks END STAGE:  End stage of kidney disease > 3 months AKIN 
 
STAGE  1:  Increase in SCr >/= 0.3 mg/dL or >/= 150% to 200% (1.5 to 2-fold) from baseline (within 48 hours) STAGE  2:  Increase in SCr to more than 200% to 300% (>2-3 fold) from baseline STAGE  3:  Increase in SCr to more than 300% (>3-fold) from baseline or SCr >/= 4.0 mg/dL with an acute increase of at least 0.5 mg/dL or initiation of renal replacement therapy KDIGO 
 
STAGE  1:  Increase in SCr by >/= 0.3 mg/dL within 48 hours or increase in SCr 1.5 to 1.9 times baseline which is known or presumed to have occurred within the prior 7 days STAGE  2:  Increase in SCr to 2.0 to 2.9 times baseline STAGE  3:  Increase in SCr to 3.0 times baseline or increase in SCr to >/= baseline or increase in SCr to >/= 4.0 mg/dL or initiation of renal replacement therapy Please clarify and document your clinical opinion in the progress notes and discharge summary including the definitive and/or presumptive diagnosis, (suspected or probable), related to the above clinical findings. Please include clinical findings supporting your diagnosis.

## 2020-04-09 NOTE — PROGRESS NOTES
Problem: Risk for Spread of Infection  Goal: Prevent transmission of infectious organism to others  Description: Prevent the transmission of infectious organisms to other patients, staff members, and visitors.   Outcome: Progressing Towards Goal     Problem: Patient Education:  Go to Education Activity  Goal: Patient/Family Education  Outcome: Progressing Towards Goal     Problem: Patient Education: Go to Patient Education Activity  Goal: Patient/Family Education  Outcome: Progressing Towards Goal     Problem: Pneumonia: Day 1  Goal: Off Pathway (Use only if patient is Off Pathway)  Outcome: Progressing Towards Goal  Goal: Activity/Safety  Outcome: Progressing Towards Goal  Goal: Consults, if ordered  Outcome: Progressing Towards Goal  Goal: Diagnostic Test/Procedures  Outcome: Progressing Towards Goal  Goal: Nutrition/Diet  Outcome: Progressing Towards Goal  Goal: Medications  Outcome: Progressing Towards Goal  Goal: Respiratory  Outcome: Progressing Towards Goal  Goal: Treatments/Interventions/Procedures  Outcome: Progressing Towards Goal  Goal: Psychosocial  Outcome: Progressing Towards Goal  Goal: *Oxygen saturation within defined limits  Outcome: Progressing Towards Goal  Goal: *Influenza vaccine administered (October-March)  Outcome: Progressing Towards Goal  Goal: *Pneumoccocal vaccine administered  Outcome: Progressing Towards Goal  Goal: *Hemodynamically stable  Outcome: Progressing Towards Goal  Goal: *Demonstrates progressive activity  Outcome: Progressing Towards Goal  Goal: *Tolerating diet  Outcome: Progressing Towards Goal     Problem: Pneumonia: Day 2  Goal: Off Pathway (Use only if patient is Off Pathway)  Outcome: Progressing Towards Goal  Goal: Activity/Safety  Outcome: Progressing Towards Goal  Goal: Consults, if ordered  Outcome: Progressing Towards Goal  Goal: Diagnostic Test/Procedures  Outcome: Progressing Towards Goal  Goal: Nutrition/Diet  Outcome: Progressing Towards Goal  Goal: Discharge Planning  Outcome: Progressing Towards Goal  Goal: Medications  Outcome: Progressing Towards Goal  Goal: Respiratory  Outcome: Progressing Towards Goal  Goal: Treatments/Interventions/Procedures  Outcome: Progressing Towards Goal  Goal: Psychosocial  Outcome: Progressing Towards Goal  Goal: *Oxygen saturation within defined limits  Outcome: Progressing Towards Goal  Goal: *Hemodynamically stable  Outcome: Progressing Towards Goal  Goal: *Demonstrates progressive activity  Outcome: Progressing Towards Goal  Goal: *Tolerating diet  Outcome: Progressing Towards Goal  Goal: *Optimal pain control at patient's stated goal  Outcome: Progressing Towards Goal     Problem: Pneumonia: Day 3  Goal: Off Pathway (Use only if patient is Off Pathway)  Outcome: Progressing Towards Goal  Goal: Activity/Safety  Outcome: Progressing Towards Goal  Goal: Consults, if ordered  Outcome: Progressing Towards Goal  Goal: Diagnostic Test/Procedures  Outcome: Progressing Towards Goal  Goal: Nutrition/Diet  Outcome: Progressing Towards Goal  Goal: Discharge Planning  Outcome: Progressing Towards Goal  Goal: Medications  Outcome: Progressing Towards Goal  Goal: Respiratory  Outcome: Progressing Towards Goal  Goal: Treatments/Interventions/Procedures  Outcome: Progressing Towards Goal  Goal: Psychosocial  Outcome: Progressing Towards Goal  Goal: *Oxygen saturation within defined limits  Outcome: Progressing Towards Goal  Goal: *Hemodynamically stable  Outcome: Progressing Towards Goal  Goal: *Demonstrates progressive activity  Outcome: Progressing Towards Goal  Goal: *Tolerating diet  Outcome: Progressing Towards Goal  Goal: *Describes available resources and support systems  Outcome: Progressing Towards Goal  Goal: *Optimal pain control at patient's stated goal  Outcome: Progressing Towards Goal     Problem: Pneumonia: Day 4  Goal: Off Pathway (Use only if patient is Off Pathway)  Outcome: Progressing Towards Goal  Goal: Activity/Safety  Outcome: Progressing Towards Goal  Goal: Nutrition/Diet  Outcome: Progressing Towards Goal  Goal: Discharge Planning  Outcome: Progressing Towards Goal  Goal: Medications  Outcome: Progressing Towards Goal  Goal: Respiratory  Outcome: Progressing Towards Goal  Goal: Treatments/Interventions/Procedures  Outcome: Progressing Towards Goal  Goal: Psychosocial  Outcome: Progressing Towards Goal     Problem: Pneumonia: Discharge Outcomes  Goal: *Demonstrates progressive activity  Outcome: Progressing Towards Goal  Goal: *Describes follow-up/return visits to physicians  Outcome: Progressing Towards Goal  Goal: *Tolerating diet  Outcome: Progressing Towards Goal  Goal: *Verbalizes name, dosage, time, side effects, and number of days to continue medications  Outcome: Progressing Towards Goal  Goal: *Influenza immunization  Outcome: Progressing Towards Goal  Goal: *Pneumococcal immunization  Outcome: Progressing Towards Goal  Goal: *Respiratory status at baseline  Outcome: Progressing Towards Goal  Goal: *Vital signs within defined limits  Outcome: Progressing Towards Goal  Goal: *Describes available resources and support systems  Outcome: Progressing Towards Goal  Goal: *Optimal pain control at patient's stated goal  Outcome: Progressing Towards Goal

## 2020-04-09 NOTE — H&P
Hospitalist Admission Note    NAME: Minnie Alcazar   :  1950   MRN:  [id number]     Date/Time:  2020 9:18 PM    Patient PCP: Concepcion Grant MD  ______________________________________________________________________  Given the patient's current clinical presentation, I have a high level of concern for decompensation if discharged from the emergency department. Complex decision making was performed, which includes reviewing the patient's available past medical records, laboratory results, and x-ray films. My assessment of this patient's clinical condition and my plan of care is as follows. Assessment / Plan:           COVID PNA suspect  high prob:  CXR  -1. Minimal, nonspecific opacity in the left midlung and left base as well as  interstitial prominence which can be seen with viral pneumonia.    Recommend  clinical management.      keep stats >93   Check stress markers/  - azithromycinRocephin/Plaq/  - F/u lactate  - F/u bcx  - Guaifenesin for cough  - Tylenol for fever  - Pulmonary consult if decomp   vit c/zinc  Continue with to keep level above 92.  Patient decompensated with transfer to ICU.       Gastroenteritis: Likely related to aforementioned viral prodrome,liquid diet advance as tolerated- Zofran prn for nausea    HTN-hold medications IV fluids for now  Anemia labs for the morning will need follow-up on outpatient basis  Hyperglycemia check A1c start insulin sliding scale  Hypokalemia replace as needed check magnesium  Acute renal failure likely prerenal IV fluids recheck labs in the morning  Code Status: Full code  Surrogate Decision Maker:DTR    DVT Prophylaxis: Lovenox   GI Prophylaxis: not indicated    Baseline: ambulatory       Subjective:   CHIEF COMPLAINT: Fatigue cough    HISTORY OF PRESENT ILLNESS:     Minnie Alcazar is a 72 y.o. black female with a history of hypertension who presents complaining of fatigue increased weakness cough over the past 3 to 4 days she does have nausea and some mild vomiting but feeling better now she wants to eat poor p.o. intake no diarrhea no abdominal pain no fevers daughter works at [employer] apparently she was tested positive for [place]. No chest pain no difficulty breathing no leg pain. We were asked to admit for work up and evaluation of the above problems. Past Medical History:   Diagnosis Date    Pneumonia 04/01/2010        Past Surgical History:   Procedure Laterality Date    COLONOSCOPY N/A 5/23/2016    COLONOSCOPY performed by Ivy Castillo MD at [address] HX BREAST BIOPSY Right     Benign. Patient thinks biopsy was on rt breast lower areolar area. Along time ago.  HX GI      colonscopy - about 2011    HX GYN      hysterectomy    HX HYSTERECTOMY      HX ORTHOPAEDIC      surgery for broken foot - right       Social History     Tobacco Use    Smoking status: Never Smoker    Smokeless tobacco: Never Used   Substance Use Topics    Alcohol use: No        Family History   Problem Relation Age of Onset    Cancer Sister 39        colon ca     No Known Allergies     Prior to Admission medications    Medication Sig Start Date End Date Taking? Authorizing Provider   amLODIPine (NORVASC) 5 mg tablet Take 1 Tab by mouth daily. 3/16/20   Tiffani Ferrer MD       REVIEW OF SYSTEMS:     I am not able to complete the review of systems because:    The patient is intubated and sedated    The patient has altered mental status due to his acute medical problems    The patient has baseline aphasia from prior stroke(s)    The patient has baseline dementia and is not reliable historian    The patient is in acute medical distress and unable to provide information           Total of 12 systems reviewed as follows:       POSITIVE= underlined text  Negative = text not underlined  General:  fever, chills, sweats, generalized weakness, weight loss/gain,      loss of appetite   Eyes:    blurred vision, eye pain, loss of vision, double vision  ENT:    rhinorrhea, pharyngitis   Respiratory:   cough, sputum production, SOB, LIANG, wheezing, pleuritic pain   Cardiology:   chest pain, palpitations, orthopnea, PND, edema, syncope   Gastrointestinal:  abdominal pain , N/V, diarrhea, dysphagia, constipation, bleeding   Genitourinary:  frequency, urgency, dysuria, hematuria, incontinence   Muskuloskeletal :  arthralgia, myalgia, back pain  Hematology:  easy bruising, nose or gum bleeding, lymphadenopathy   Dermatological: rash, ulceration, pruritis, color change / jaundice  Endocrine:   hot flashes or polydipsia   Neurological:  headache, dizziness, confusion, focal weakness, paresthesia,     Speech difficulties, memory loss, gait difficulty  Psychological: Feelings of anxiety, depression, agitation    Objective:   VITALS:    Visit Vitals  /53   Pulse 77   Temp 99.4 °F (37.4 °C)   Resp 21   Ht 5' 3\" (1.6 m)   Wt 47.6 kg (104 lb 15 oz)   SpO2 96%   BMI 18.59 kg/m²       PHYSICAL EXAM:    General:    Alert, cooperative, no distress, appears stated age. HEENT: Atraumatic, anicteric sclerae, pink conjunctivae     No oral ulcers, mucosa moist, throat clear, dentition fair  Neck:  Supple, symmetrical,  thyroid: non tender  Lungs:   decrease breath Sounds bilaterally. No Wheezing or Rhonchi. No rales. Chest wall:  No tenderness  No Accessory muscle use. Heart:   Regular  rhythm,  No  murmur   No edema  Abdomen:   Soft, non-tender. Not distended. Bowel sounds normal  Extremities: No cyanosis. No clubbing,      Skin turgor normal, Capillary refill normal, Radial dial pulse 2+  Skin:     Not pale. Not Jaundiced  No rashes   Psych:  Fair insight. Not depressed. Not anxious or agitated. Neurologic: EOMs intact. No facial asymmetry. No aphasia or slurred speech. Symmetrical strength, Sensation grossly intact.  Alert and oriented X 4.     _______________________________________________________________________  Care Plan discussed with: Comments   Patient xx    Family      RN x    Care Manager                    Consultant:  x    _______________________________________________________________________  Expected  Disposition:   Home with Family x   HH/PT/OT/RN    SNF/LTC    JORGE    ________________________________________________________________________  TOTAL TIME:  61  Minutes    Critical Care Provided     Minutes non procedure based      Comments    x Reviewed previous records   >50% of visit spent in counseling and coordination of care x Discussion with patient and/or family and questions answered       ________________________________________________________________________  Signed: Chantal Thomas MD    Procedures: see electronic medical records for all procedures/Xrays and details which were not copied into this note but were reviewed prior to creation of Plan.     LAB DATA REVIEWED:    Recent Results (from the past 24 hour(s))   EKG, 12 LEAD, INITIAL    Collection Time: 04/08/20  2:09 PM   Result Value Ref Range    Ventricular Rate 99 BPM    Atrial Rate 99 BPM    P-R Interval 138 ms    QRS Duration 64 ms    Q-T Interval 314 ms    QTC Calculation (Bezet) 402 ms    Calculated P Axis 79 degrees    Calculated R Axis 81 degrees    Calculated T Axis 17 degrees    Diagnosis       Sinus rhythm with premature atrial complexes  Nonspecific T wave abnormality  Confirmed by Anthony Hairston ([id number]) on 4/8/2020 7:14:54 PM     CBC WITH AUTOMATED DIFF    Collection Time: 04/08/20  2:21 PM   Result Value Ref Range    WBC 8.2 3.6 - 11.0 K/uL    RBC 3.63 (L) 3.80 - 5.20 M/uL    HGB 11.5 11.5 - 16.0 g/dL    HCT 33.3 (L) 35.0 - 47.0 %    MCV 91.7 80.0 - 99.0 FL    MCH 31.7 26.0 - 34.0 PG    MCHC 34.5 30.0 - 36.5 g/dL    RDW 12.5 11.5 - 14.5 %    PLATELET 298 713 - 029 K/uL    MPV 10.2 8.9 - 12.9 FL    NRBC 0.0 0  WBC    ABSOLUTE NRBC 0.00 0.00 - 0.01 K/uL    NEUTROPHILS 80 (H) 32 - 75 %    LYMPHOCYTES 12 12 - 49 %    MONOCYTES 7 5 - 13 % EOSINOPHILS 0 0 - 7 %    BASOPHILS 0 0 - 1 %    IMMATURE GRANULOCYTES 1 (H) 0.0 - 0.5 %    ABS. NEUTROPHILS 6.6 1.8 - 8.0 K/UL    ABS. LYMPHOCYTES 1.0 0.8 - 3.5 K/UL    ABS. MONOCYTES 0.5 0.0 - 1.0 K/UL    ABS. EOSINOPHILS 0.0 0.0 - 0.4 K/UL    ABS. BASOPHILS 0.0 0.0 - 0.1 K/UL    ABS. IMM. GRANS. 0.0 0.00 - 0.04 K/UL    DF AUTOMATED     METABOLIC PANEL, COMPREHENSIVE    Collection Time: 04/08/20  2:21 PM   Result Value Ref Range    Sodium 137 136 - 145 mmol/L    Potassium 3.4 (L) 3.5 - 5.1 mmol/L    Chloride 106 97 - 108 mmol/L    CO2 24 21 - 32 mmol/L    Anion gap 7 5 - 15 mmol/L    Glucose 160 (H) 65 - 100 mg/dL    BUN 16 6 - 20 MG/DL    Creatinine 1.41 (H) 0.55 - 1.02 MG/DL    BUN/Creatinine ratio 11 (L) 12 - 20      GFR est AA 45 (L) >60 ml/min/1.73m2    GFR est non-AA 37 (L) >60 ml/min/1.73m2    Calcium 8.7 8.5 - 10.1 MG/DL    Bilirubin, total 0.9 0.2 - 1.0 MG/DL    ALT (SGPT) 32 12 - 78 U/L    AST (SGOT) 52 (H) 15 - 37 U/L    Alk. phosphatase 56 45 - 117 U/L    Protein, total 8.0 6.4 - 8.2 g/dL    Albumin 3.4 (L) 3.5 - 5.0 g/dL    Globulin 4.6 (H) 2.0 - 4.0 g/dL    A-G Ratio 0.7 (L) 1.1 - 2.2     SAMPLES BEING HELD    Collection Time: 04/08/20  2:21 PM   Result Value Ref Range    SAMPLES BEING HELD  1 BLUE, 1 RED, 1 SST     COMMENT        Add-on orders for these samples will be processed based on acceptable specimen integrity and analyte stability, which may vary by analyte.    LACTIC ACID    Collection Time: 04/08/20  2:21 PM   Result Value Ref Range    Lactic acid 1.5 0.4 - 2.0 MMOL/L   URINALYSIS W/ REFLEX CULTURE    Collection Time: 04/08/20  2:22 PM   Result Value Ref Range    Color DARK YELLOW      Appearance CLOUDY (A) CLEAR      Specific gravity 1.015 1.003 - 1.030      pH (UA) 5.5 5.0 - 8.0      Protein 100 (A) NEG mg/dL    Glucose Negative NEG mg/dL    Ketone Negative NEG mg/dL    Bilirubin Negative NEG      Blood Negative NEG      Urobilinogen 1.0 0.2 - 1.0 EU/dL    Nitrites Negative NEG Leukocyte Esterase Negative NEG      WBC 0-4 0 - 4 /hpf    RBC 0-5 0 - 5 /hpf    Epithelial cells FEW FEW /lpf    Bacteria Negative NEG /hpf    UA:UC IF INDICATED CULTURE NOT INDICATED BY UA RESULT CNI      Granular cast 0-2 (A) NEG /lpf

## 2020-04-09 NOTE — PROGRESS NOTES
TRANSFER - IN REPORT:    Verbal report received from Shereen(name) on Sukumar Dillon  being received from ED(unit) for routine progression of care      Report consisted of patients Situation, Background, Assessment and   Recommendations(SBAR). Information from the following report(s) SBAR was reviewed with the receiving nurse. Opportunity for questions and clarification was provided. Assessment completed upon patients arrival to unit and care assumed. Primary Nurse Cristino Baig and Author GERMAN Ochoa performed a dual skin assessment on this patient No impairment noted  Arnel score is 22      Bedside shift change report GIVEN TO Eduardo Conn RN. Report included the following information SBAR. SIGNIFICANT CHANGES DURING SHIFT:    00:50 Pt staying at 88% on 2L, over 92% on 3L.    0500: Pt ambulated to bedside commode, sats stayed in the low to mid 80's, placed on 4L to stay above 92. Will attempt to wean down after resting. CONCERNS TO ADDRESS WITH MD:             [address] NURSING NOTE   Admission Date 4/8/2020   Admission Diagnosis COVID-19 [U07.1, J98.8]   Consults None      Cardiac Monitoring [x] Yes [] No      Purposeful Hourly Rounding [x] Yes    Scott Score Total Score: 2   Scott score 3 or > [x] Bed Alarm [] Avasys [] 1:1 sitter [] Patient refused (Signed refusal form in chart)   Arnel Score Arnel Score: 19   Arnel score 14 or < [] PMT consult [] Wound Care consult    []  Specialty bed  [] Nutrition consult      Influenza Vaccine Received Flu Vaccine for Current Season (usually Sept-March): Yes           Oxygen needs? [] Room air Oxygen @  []1L    []2L    [x]3L   [x]4L    []5L   []6L via  NC   Chronic home O2 use?  [] Yes [x] No  Perform O2 challenge test and document in progress note using smartphrase (.Homeoxygen)      Last bowel movement Last Bowel Movement Date: 04/07/20      Urinary Catheter             LDAs               Peripheral IV 04/08/20 Left Antecubital (Active) Readmission Risk Assessment Tool Score Low Risk            5       Total Score        5 Pt. Coverage (Medicare=5 , Medicaid, or Self-Pay=4)        Criteria that do not apply:    Has Seen PCP in Last 6 Months (Yes=3, No=0)    . Living with Significant Other. Assisted Living. LTAC. SNF.  or   Rehab    Patient Length of Stay (>5 days = 3)    IP Visits Last 12 Months (1-3=4, 4=9, >4=11)    Charlson Comorbidity Score (Age + Comorbid Conditions)       Expected Length of Stay - - -   Actual Length of Stay 1

## 2020-04-10 LAB
CK SERPL-CCNC: 143 U/L (ref 26–192)
CRP SERPL-MCNC: 10.2 MG/DL (ref 0–0.6)
D DIMER PPP FEU-MCNC: 0.9 MG/L FEU (ref 0–0.65)
FERRITIN SERPL-MCNC: 2567 NG/ML (ref 26–388)
FLUID CULTURE, SPNG2: NORMAL
GLUCOSE BLD STRIP.AUTO-MCNC: 111 MG/DL (ref 65–100)
GLUCOSE BLD STRIP.AUTO-MCNC: 118 MG/DL (ref 65–100)
GLUCOSE BLD STRIP.AUTO-MCNC: 121 MG/DL (ref 65–100)
GLUCOSE BLD STRIP.AUTO-MCNC: 99 MG/DL (ref 65–100)
L PNEUMO1 AG UR QL IA: NEGATIVE
ORGANISM ID, SPNG3: NORMAL
PLEASE NOTE, SPNG4: NORMAL
S PNEUM AG SPEC QL LA: NEGATIVE
SARS-COV-2, COV2: DETECTED
SERVICE CMNT-IMP: ABNORMAL
SERVICE CMNT-IMP: NORMAL
SPECIMEN SOURCE, FCOV2M: ABNORMAL
SPECIMEN SOURCE: NORMAL
SPECIMEN SOURCE: NORMAL
SPECIMEN, SPNG1: NORMAL

## 2020-04-10 PROCEDURE — 82728 ASSAY OF FERRITIN: CPT

## 2020-04-10 PROCEDURE — 77010033678 HC OXYGEN DAILY

## 2020-04-10 PROCEDURE — 86140 C-REACTIVE PROTEIN: CPT

## 2020-04-10 PROCEDURE — 85379 FIBRIN DEGRADATION QUANT: CPT

## 2020-04-10 PROCEDURE — 74011000258 HC RX REV CODE- 258: Performed by: INTERNAL MEDICINE

## 2020-04-10 PROCEDURE — 74011250637 HC RX REV CODE- 250/637: Performed by: INTERNAL MEDICINE

## 2020-04-10 PROCEDURE — 83520 IMMUNOASSAY QUANT NOS NONAB: CPT

## 2020-04-10 PROCEDURE — 74011636637 HC RX REV CODE- 636/637: Performed by: INTERNAL MEDICINE

## 2020-04-10 PROCEDURE — 82962 GLUCOSE BLOOD TEST: CPT

## 2020-04-10 PROCEDURE — 86480 TB TEST CELL IMMUN MEASURE: CPT

## 2020-04-10 PROCEDURE — 74011250637 HC RX REV CODE- 250/637: Performed by: FAMILY MEDICINE

## 2020-04-10 PROCEDURE — 82550 ASSAY OF CK (CPK): CPT

## 2020-04-10 PROCEDURE — 74011250636 HC RX REV CODE- 250/636: Performed by: INTERNAL MEDICINE

## 2020-04-10 PROCEDURE — 94760 N-INVAS EAR/PLS OXIMETRY 1: CPT

## 2020-04-10 PROCEDURE — 36415 COLL VENOUS BLD VENIPUNCTURE: CPT

## 2020-04-10 PROCEDURE — 65660000001 HC RM ICU INTERMED STEPDOWN

## 2020-04-10 RX ORDER — EPINEPHRINE 1 MG/ML
0.3 INJECTION, SOLUTION, CONCENTRATE INTRAVENOUS
Status: DISCONTINUED | OUTPATIENT
Start: 2020-04-10 | End: 2020-04-13

## 2020-04-10 RX ORDER — GUAIFENESIN/DEXTROMETHORPHAN 100-10MG/5
10 SYRUP ORAL
Status: DISCONTINUED | OUTPATIENT
Start: 2020-04-10 | End: 2020-04-13 | Stop reason: HOSPADM

## 2020-04-10 RX ORDER — DIPHENHYDRAMINE HYDROCHLORIDE 50 MG/ML
50 INJECTION, SOLUTION INTRAMUSCULAR; INTRAVENOUS
Status: ACTIVE | OUTPATIENT
Start: 2020-04-10 | End: 2020-04-11

## 2020-04-10 RX ADMIN — HYDROXYCHLOROQUINE SULFATE 200 MG: 200 TABLET ORAL at 11:44

## 2020-04-10 RX ADMIN — HYDROXYCHLOROQUINE SULFATE 200 MG: 200 TABLET ORAL at 20:49

## 2020-04-10 RX ADMIN — OXYCODONE HYDROCHLORIDE AND ACETAMINOPHEN 500 MG: 500 TABLET ORAL at 17:42

## 2020-04-10 RX ADMIN — GUAIFENESIN 600 MG: 600 TABLET, EXTENDED RELEASE ORAL at 09:59

## 2020-04-10 RX ADMIN — ZINC SULFATE 220 MG (50 MG) CAPSULE 1 CAPSULE: CAPSULE at 10:00

## 2020-04-10 RX ADMIN — GUAIFENESIN AND DEXTROMETHORPHAN 10 ML: 100; 10 SYRUP ORAL at 17:42

## 2020-04-10 RX ADMIN — Medication 10 ML: at 06:23

## 2020-04-10 RX ADMIN — OXYCODONE HYDROCHLORIDE AND ACETAMINOPHEN 500 MG: 500 TABLET ORAL at 10:00

## 2020-04-10 RX ADMIN — Medication 10 ML: at 17:43

## 2020-04-10 RX ADMIN — CEFTRIAXONE 1 G: 1 INJECTION, POWDER, FOR SOLUTION INTRAMUSCULAR; INTRAVENOUS at 20:45

## 2020-04-10 RX ADMIN — AZITHROMYCIN MONOHYDRATE 500 MG: 500 INJECTION, POWDER, LYOPHILIZED, FOR SOLUTION INTRAVENOUS at 23:53

## 2020-04-10 RX ADMIN — SODIUM CHLORIDE 100 ML/HR: 900 INJECTION, SOLUTION INTRAVENOUS at 16:30

## 2020-04-10 RX ADMIN — HEPARIN SODIUM 5000 UNITS: 5000 INJECTION INTRAVENOUS; SUBCUTANEOUS at 11:45

## 2020-04-10 RX ADMIN — Medication 10 ML: at 20:49

## 2020-04-10 RX ADMIN — GUAIFENESIN 600 MG: 600 TABLET, EXTENDED RELEASE ORAL at 20:49

## 2020-04-10 RX ADMIN — GUAIFENESIN AND DEXTROMETHORPHAN 10 ML: 100; 10 SYRUP ORAL at 02:56

## 2020-04-10 RX ADMIN — SODIUM CHLORIDE 100 ML/HR: 900 INJECTION, SOLUTION INTRAVENOUS at 06:23

## 2020-04-10 RX ADMIN — HEPARIN SODIUM 5000 UNITS: 5000 INJECTION INTRAVENOUS; SUBCUTANEOUS at 20:49

## 2020-04-10 RX ADMIN — TOCILIZUMAB 324 MG: 180 INJECTION, SOLUTION SUBCUTANEOUS at 16:32

## 2020-04-10 NOTE — PROGRESS NOTES
Hospitalist Progress Note    NAME: Amor Malcolm   :  1950   MRN:  [id number]       Assessment / Plan:  Acute hypoxic resp failure  Severe sepsis POA  COVID -19 pna  CXR: Minimal, nonspecific opacity in the left midlung and left base as well as  interstitial prominence which can be seen with viral pneumonia.    -maintaining sats on 3L, no prior lung Dz, no prior home O2  -follow stress markers/  - azithromycinRocephin/Plaq/  - F/u lactate  - F/u bcx  - Guaifenesin for cough  - Tylenol for fever  - Pulmonary consult if decomp   vit c/zinc  Continue with to keep level above 92.  Patient decompensated with transfer to ICU.   -appreciate pulm, awake and sleep proning as able     Gastroenteritis: Likely related to aforementioned viral prodrome,liquid diet advance as tolerated- Zofran prn for nausea     HTN-hold medications IV fluids for now  Anemia labs for the morning will need follow-up on outpatient basis  Hyperglycemia check A1c start insulin sliding scale  Hypokalemia replace as needed check magnesium  Acute renal failure likely prerenal IV fluids recheck labs in the morning  Code Status: Full code  Surrogate Decision Maker:DTR     DVT Prophylaxis: Lovenox   GI Prophylaxis: not indicated     Baseline: ambulatory      Subjective:     Chief Complaint / Reason for Physician Visit  Patient resting in bed. Currently on 3L NC, no fevers last 24hrs. No new complaints    Discussed with RN events overnight. Review of Systems:  Symptom Y/N Comments  Symptom Y/N Comments   Fever/Chills n   Chest Pain n    Poor Appetite    Edema     Cough y   Abdominal Pain n    Sputum n   Joint Pain     SOB/LIANG n   Pruritis/Rash     Nausea/vomit n   Tolerating PT/OT     Diarrhea n   Tolerating Diet y    Constipation    Other       Could NOT obtain due to:      Objective:     VITALS:   Last 24hrs VS reviewed since prior progress note.  Most recent are:  Patient Vitals for the past 24 hrs:   Temp Pulse Resp BP SpO2   04/10/20 1444 98.5 °F (36.9 °C) 82 18 139/84 97 %   04/10/20 1108 98.7 °F (37.1 °C) 78 18 134/74 96 %   04/10/20 0759 98.6 °F (37 °C) 76 19 129/70 95 %   04/10/20 0229 98.5 °F (36.9 °C) 81 18 121/75 93 %   04/09/20 2306 98.6 °F (37 °C) 97 16 118/69 94 %   04/09/20 2129 98.7 °F (37.1 °C) 82 18 110/62 94 %   04/09/20 1811  82 20 140/69 94 %   04/09/20 1804  79 16 130/66 91 %   04/09/20 1757  82 20 118/64 93 %   04/09/20 1750  85 16 125/72 93 %       Intake/Output Summary (Last 24 hours) at 4/10/2020 1602  Last data filed at 4/10/2020 0845  Gross per 24 hour   Intake 240 ml   Output    Net 240 ml        PHYSICAL EXAM:  General: WD, WN. Alert, cooperative, no acute distress    EENT:  EOMI. Anicteric sclerae. MMM  Resp:  Diminished air entry, no rales, rhonchi. Speaks full sentences w/o distress. No accessory muscle use  CV:  Regular  rhythm,  No edema  GI:  Soft, Non distended, Non tender.  +Bowel sounds  Neurologic:  Alert and oriented X 3, normal speech,   Psych:   Good insight. Not anxious nor agitated  Skin:  No rashes. No jaundice    Reviewed most current lab test results and cultures  YES  Reviewed most current radiology test results   YES  Review and summation of old records today    NO  Reviewed patient's current orders and MAR    YES  PMH/SH reviewed - no change compared to H&P  ________________________________________________________________________  Care Plan discussed with:    Comments   Patient x    Family      RN x    Care Manager     Consultant                        Multidiciplinary team rounds were held today with , nursing, pharmacist and clinical coordinator. Patient's plan of care was discussed; medications were reviewed and discharge planning was addressed.      ________________________________________________________________________  Total NON critical care TIME:  35 Minutes    Total CRITICAL CARE TIME Spent:   Minutes non procedure based      Comments   >50% of visit spent in counseling and coordination of care x    ________________________________________________________________________  Susan Montague DO     Procedures: see electronic medical records for all procedures/Xrays and details which were not copied into this note but were reviewed prior to creation of Plan. LABS:  I reviewed today's most current labs and imaging studies.   Pertinent labs include:  Recent Labs     04/09/20  0001 04/08/20  1421   WBC 8.0 8.2   HGB 9.8* 11.5   HCT 29.2* 33.3*    235     Recent Labs     04/09/20  0001 04/08/20  2237 04/08/20  1421     --  137   K 3.9  --  3.4*   *  --  106   CO2 23  --  24   *  --  160*   BUN 13  --  16   CREA 1.06*  --  1.41*   CA 7.4*  --  8.7   MG 1.8  --   --    ALB 2.6*  --  3.4*   TBILI 0.5  --  0.9   SGOT 43*  --  52*   ALT 26  --  32   INR  --  1.0  --        Signed: Susan Montague DO

## 2020-04-10 NOTE — CONSULTS
[facility] INTENSIVIST Consult Service Note  Pulmonary, Critical Care, and Sleep Medicine    Name: Viviane Tavares MRN: [id number]   : 1950 Hospital: [facility]   Date: 4/10/2020  Admission date: 2020 Hospital Day: 3       Subjective/Interval History:   Seen earlier today on rounds. Pt is unstable and acutely ill. Patient was re-evaluated multiple times with repeated discussions with team throughout the day    IMPRESSION:   1. Acute on Chronic Respiratory Failure with Hypoxia,   2. COVID 19 infection;   3. Viral pneumonia   4. Sepsis and at risk for Cytokine Release Syndrome and ARDS; s/p tocilizumab x 2  5. Probable COVID related Gastroenteritis:   6. HTN-   7. Anemia   8. Hyperglycemia check A1c start insulin sliding scale  9. Hypokalemia replace as needed check magnesium  10. Acute renal failure   11. Body mass index is 19.98 kg/m². 12. Additional workup outlined below  13. Multiorgan dysfunction as outlined above: Pt has one or more acute or chronic illnesses with severe exacerbation with progression or side effects of treatment that poses a threat to life or bodily function  14. Pt is unstable, unpredictable needing more inpatient monitoring; at high risk of sudden decline and decompensation with life threatening consequenses and continued end organ dysfunction and failure      RECOMMENDATIONS/PLAN:   1. Please make sure pt always has two good PIV if possible   2. Will see over weekend upon request, if pt more labored and higher O2 needs  3. Agree withTocilizumab ( has received two sc doses)  4. Check lipase, TG, pro BNP in AM  5. If taking PO try antitussives   6. Antiemetics prn  7. Awake and sleep proning as much as tolerable 12-18 hours/ 24 hours   8. IV Fluids if pt has had GI sx with close watch of fluid status and avoid volume overload  9. IV ABx per protocol; azithromycin, ceftriaxone   10.  O2 to keep sats > 93% but if gas exchange worsens must be prepared for abrupt decline in 12 -24 hours  11. follow inflammatory markers carefully including DDimer, Ferritin, CK, CRP, LD  12. IL 6 level  13. Vit C 500 mg PO BID to help antiinflammatory mediators or IV ascorbic acid 50 mg/ kg IV in four divided doses daily (add thiamine if h/o kidney stones)  14. Zinc 220 mg PO BID has some antiviral properties  15. Hydroxychloroquine 400 mg PO BID x 2 doses then 200 mg PO BID for four days  16. Monitor QT interval daily   17. Incentive spirometer   18. Empower and encourage pt to cover mouth and muffle cough and work with nurses to mitigate droplet or aerosol production by giving pt something to cough into( gowns have no sleeves)   19. No systemic steroids   20. No aerosolization producing procedures or devices unless in negative pressure room and even then only with select patients  21. Inhalers- bronchodilators if needed   22. NPO if intubation anticipated   23. Supplemental O2 for refractory hypoxia to keep sats > 93%  24. Low threshold to Intubate and place on vent if gas exchange declines;    25. High flow nasal Cannula oxygen as salvage oxygen delivery device and NIV/PAP therapy not generally recommended due to aerosol generation but could be considered on case by case basis but only if in negative pressure setting   26. Replete electrolytes  27. probiotics  28. Minimize imaging, procedures that may exposure others to infections  29. Labs to follow electrolytes, renal function and and blood counts   30. Pt needs IV fluids with additives and Drug therapy requiring intensive monitoring for toxicity  31. Prescription drug management with home med reconciliation reviewed  32. DVT, SUP prophylaxis   33. pt counseling with enduring education about protection, public health safety topics   29.  Clarify goals of care independent of COVID status and consider enlisting help from palliative care team; families are scared          Subjective/Initial History:   I have reviewed the flowsheet and previous days notes. Seen earlier today on rounds. I was asked by Hiro Murray MD to see Sp Diaz a 72 y.o.  female  in consultation for a chief complaint of respiratory failure and COVID pneumonia    Excerpts from admission 4/8/2020 and consult notes reviewed as follows:     John Gong is a 72 y.o. black female with a history of hypertension who presents complaining of fatigue increased weakness cough over the past 3 to 4 days she does have nausea and some mild vomiting but feeling better now she wants to eat poor p.o. intake no diarrhea no abdominal pain no fevers daughter works at [employer] apparently she was tested positive for [employer]. No chest pain no difficulty breathing no leg pain. '    Pt needing more O2. D/w Pharmacy earlier and Dr. Dorothea Lemons. Desats with any activity. Fever down. No more diarrhea? Tolerated tocilizumab. On 3-4 LPm at rest.   CXR reviewed. Travel Screening       Question Response     Do you have any of the following symptoms? Fever; Shortness of breath;Vomiting     In the last month, have you been in contact with someone who was confirmed or suspected to have Coronavirus / COVID-19? Yes     Have you traveled internationally in the last month? No      Travel History   Travel since 03/10/20     No documented travel since 03/10/20          Discussed potential course/resolution/complications of diagnosis in detail with patient. Medication risks/benefits/costs/interactions/alternatives discussed with patient. She expressed understanding with the diagnosis and plan. Patient PCP: aBiley Jean MD  PMH:  has a past medical history of Pneumonia (04/01/2010). She also has no past medical history of Adverse effect of anesthesia or Sleep apnea. PSH:   has a past surgical history that includes hx gyn; hx orthopaedic; hx gi; colonoscopy (N/A, 5/23/2016); hx hysterectomy; and hx breast biopsy (Right).    FHX: family history includes Cancer (age of onset: 39) in her sister. SHX:  reports that she has never smoked. She has never used smokeless tobacco. She reports that she does not drink alcohol or use drugs. ROS:Review of systems not obtained due to patient factors.     No Known Allergies   MEDS:   Current Facility-Administered Medications   Medication    guaiFENesin-dextromethorphan (ROBITUSSIN DM) 100-10 mg/5 mL syrup 10 mL    diphenhydrAMINE (BENADRYL) injection 50 mg    famotidine (PF) (PEPCID) 20 mg in 0.9% sodium chloride 10 mL injection    methylPREDNISolone (PF) (Solu-MEDROL) injection 125 mg    EPINEPHrine HCl (PF) (ADRENALIN) 1 mg/mL (1 mL) injection 0.3 mg    sodium chloride (NS) flush 5-10 mL    acetaminophen (TYLENOL) tablet 650 mg    Or    acetaminophen (TYLENOL) suppository 650 mg    hydroxychloroquine (PLAQUENIL) tablet 200 mg    azithromycin (ZITHROMAX) 500 mg in 0.9% sodium chloride (MBP/ADV) 250 mL    cefTRIAXone (ROCEPHIN) 1 g in 0.9% sodium chloride (MBP/ADV) 50 mL    zinc sulfate (ZINCATE) capsule 1 Cap    ascorbic acid (vitamin C) (VITAMIN C) tablet 500 mg    sodium chloride (NS) flush 5-40 mL    sodium chloride (NS) flush 5-40 mL    0.9% sodium chloride infusion    ondansetron (ZOFRAN) injection 4 mg    hydrALAZINE (APRESOLINE) 20 mg/mL injection 10 mg    guaiFENesin ER (MUCINEX) tablet 600 mg    glucose chewable tablet 16 g    dextrose (D50W) injection syrg 12.5-25 g    glucagon (GLUCAGEN) injection 1 mg    insulin lispro (HUMALOG) injection    heparin (porcine) injection 5,000 Units          Objective:     Vital Signs: Telemetry:    normal sinus rhythm Intake/Output:   Visit Vitals  /74   Pulse 77   Temp 98.5 °F (36.9 °C)   Resp 18   Ht 5' 3\" (1.6 m)   Wt 51.2 kg (112 lb 12.8 oz)   SpO2 93%   BMI 19.98 kg/m²       Temp (24hrs), Av.6 °F (37 °C), Min:98.5 °F (36.9 °C), Max:98.7 °F (37.1 °C)        O2 Device: Nasal cannula O2 Flow Rate (L/min): 2 l/min         Body mass index is 19.98 kg/m². Wt Readings from Last 4 Encounters:   04/09/20 51.2 kg (112 lb 12.8 oz)   02/25/20 49.9 kg (109 lb 14.4 oz)   01/28/20 50.1 kg (110 lb 6.4 oz)   09/26/19 51.5 kg (113 lb 9.6 oz)          Intake/Output Summary (Last 24 hours) at 4/10/2020 1654  Last data filed at 4/10/2020 0845  Gross per 24 hour   Intake 240 ml   Output    Net 240 ml       Last shift:      04/10 0701 - 04/10 1900  In: 120 [P.O.:120]  Out: -   Last 3 shifts: 04/08 1901 - 04/10 0700  In: 1160 [P.O.:360; I.V.:800]  Out: -        Physical Exam:    refer to  exam which was reviewed and discussed with him. This shared exam necessitated due to COVID precautions, safety in midst of pandemic    General:  female;    Labs:    Recent Labs     04/09/20  0001 04/08/20 2237 04/08/20  1421   WBC 8.0  --  8.2   HGB 9.8*  --  11.5     --  235   INR  --  1.0  --      Recent Labs     04/09/20  0001 04/08/20 2237 04/08/20  1421     --  137   K 3.9  --  3.4*   *  --  106   CO2 23  --  24   *  --  160*   BUN 13  --  16   CREA 1.06*  --  1.41*   CA 7.4*  --  8.7   MG 1.8  --   --    LAC  --  0.9 1.5   ALB 2.6*  --  3.4*   SGOT 43*  --  52*   ALT 26  --  32     No results for input(s): PH, PCO2, PO2, HCO3, FIO2 in the last 72 hours.   Recent Labs     04/10/20  0308 04/08/20 2237     --    TROIQ  --  0.09*     No results found for: BNPP, BNP   Lab Results   Component Value Date/Time    Culture result: LIGHT NORMAL RESPIRATORY JORGE SO FAR 04/09/2020 12:33 PM    Culture result: NO GROWTH 2 DAYS 04/08/2020 02:21 PM    Culture result: NO GROWTH 2 DAYS 04/08/2020 02:21 PM      Lab Results   Component Value Date/Time     04/10/2020 03:08 AM       Imaging:  I have personally reviewed the patients radiographs and have reviewed the reports:    CXR Results  (Last 48 hours)    None        Results from Hospital Encounter encounter on 04/08/20   XR CHEST PORT    Narrative INDICATION: . SOB  Additional history: Dyspnea  COMPARISON: Previous chest xray, 11/30/2017 and 1/24/2019. LIMITATIONS: Portable technique. Jarod Hilt FINDINGS: Single frontal view of the chest.   .  Lines/tubes/surgical: None. Heart/mediastinum: Unremarkable. Lungs/pleura: Minimal opacity in the left midlung and left base. Slight  interstitial prominence. Additional Comments: None. .    Impression IMPRESSION:  1. Minimal, nonspecific opacity in the left midlung and left base as well as  interstitial prominence which can be seen with viral pneumonia. Recommend  clinical management. Results from Appointment encounter on 01/24/19   XR CHEST PA LAT    Narrative EXAM: XR CHEST PA LAT    INDICATION: dyspnea    COMPARISON: 11/30/2017. FINDINGS: PA and lateral radiographs of the chest demonstrate hyperaeration of  the lungs. No acute findings seen. Jarod Hilt Heart and mediastinal shadows are stable. The bones and soft tissues are within normal limits. Impression IMPRESSION: Hyperaeration without acute finding or change   Results from Hospital Encounter encounter on 11/30/17   XR CHEST PA LAT    Narrative EXAM:  XR CHEST PA LAT. INDICATION: Cough. COMPARISON: 1/29/2013. FINDINGS:   PA and lateral radiographs of the chest were obtained. Lungs: The lungs are hyperinflated and clear of mass, nodule, airspace disease  or edema. Pleura: There is no pleural effusion or pneumothorax. Mediastinum: The cardiac and mediastinal contours and pulmonary vascularity are  normal.  Bones and soft tissues: There are degenerative changes of the spine. Left upper  quadrant calcifications probably represent splenic granulomas and are unchanged. Impression IMPRESSION: COPD. No airspace disease or acute abnormality and no change. Results from [place] encounter on 04/24/11   CT ABDOMEN PELVIS WITHOUT CONTRAST    Narrative **Final Report**       ICD Codes / Adm. Diagnosis: 12   / Back Pain    Examination:  CT ABD PELVIS WO CON  - [id number] - Apr 24 2011  8:38PM  Accession No:  [id number]  Reason:  abdominal pain      REPORT:  INDICATION: back pain    Exam: Noncontrast CT of the abdomen and pelvis is performed with 5 mm   collimation. Coronal reformatted images were also performed. Direct comparison is made to prior CT dated dated 6/19/2009. FINDINGS:    The visualized lung bases are clear. Abdomen: Splenic granulomata are noted. The liver, adrenals, pancreas,   gallbladder and kidneys are stable on noncontrast images. Left renal cyst is   unchanged. No renal, ureteral bladder calculus is visualized. There is no   perinephric stranding, hydronephrosis or hydroureter. No thickened or   dilated loop of large or small bowel seen. There is no free intraperitoneal   gas or fluid. Pelvis: Urinary bladder is partially filled and grossly normal. There is no   free fluid in the pelvis. The appendix is normal. The uterus is absent. IMPRESSION: No renal calculi or evidence of obstructive uropathy. Interpreting/Reading Doctor: TODD B. Aneita Severin ([id number])  Transcribed:  on 04/24/2011  Approved: TODD B. Aneita Severin ([id number])  04/24/2011             Distribution:  Attending Doctor: Marcy Barton               This care involved high complexity medical decision making to treat acute and unstable vital organ system failure, and to prevent life threatening deterioration of the patients condition.  I personally:  · Reviewed the flowsheet and previous days notes  · Reviewed and summarized records or history from previous days note or discussions with staff, family  · Parenteral controlled substances - Reviewed/ Adjusted / [employer] / Started  · High Risk Drug therapy requiring intensive monitoring for toxicity: eg steroids, pressors, antibiotics  · Reviewed and/or ordered Clinical lab tests  · Reviewed and/or ordered Radiology tests  · Reviewed and/or ordered of Medicine tests  · Independently visualized radiologic Images  · Reviewed the patients ECG / Telemetry  · discussed my assessment/management with : Consultants, Nursing, Respiratory Therapy, Dr. Yessi Salguero for coordination of care               Thank you for allowing us to participate in the care of this patient.       Dmeetris Comer MD

## 2020-04-10 NOTE — PROGRESS NOTES
Problem: Pneumonia: Day 3  Goal: Off Pathway (Use only if patient is Off Pathway)  Outcome: Progressing Towards Goal  Goal: Activity/Safety  Outcome: Progressing Towards Goal  Goal: Consults, if ordered  Outcome: Progressing Towards Goal  Goal: Diagnostic Test/Procedures  Outcome: Progressing Towards Goal  Goal: Nutrition/Diet  Outcome: Progressing Towards Goal  Goal: Discharge Planning  Outcome: Progressing Towards Goal  Goal: Medications  Outcome: Progressing Towards Goal  Goal: Respiratory  Outcome: Progressing Towards Goal  Goal: Treatments/Interventions/Procedures  Outcome: Progressing Towards Goal  Goal: Psychosocial  Outcome: Progressing Towards Goal  Goal: *Oxygen saturation within defined limits  Outcome: Progressing Towards Goal  Goal: *Hemodynamically stable  Outcome: Progressing Towards Goal  Goal: *Demonstrates progressive activity  Outcome: Progressing Towards Goal  Goal: *Tolerating diet  Outcome: Progressing Towards Goal  Goal: *Describes available resources and support systems  Outcome: Progressing Towards Goal  Goal: *Optimal pain control at patient's stated goal  Outcome: Progressing Towards Goal

## 2020-04-10 NOTE — PROGRESS NOTES
Nursing notes reviewed and accepted.    Liliam Miller is a 12 year old female who presents for well child exam.  Patient presents with Mother.    CONCERNS raised today include: knee pain:  Patellofemoral syndrome was discussed at our last visit.  She has been attempting to do stretching exercises but not on a consistent basis.  Over the weekend Db was running through the house and slid on a dog bed landing on her right knee.  She is complaining of pain and discomfort.  There is been no swelling or bruising noted.  She is walking without limping.  She has been taking ibuprofen which seems to be helping.    Mom is also concerned about a birth bernarda that she has to the top of her had.  They have been part air here differently so it is not exposed to the sun during the summer months.  She is not irritating it with coming over here.  Bowel which is like have it looked that on a yearly basis.  Liliam has also been getting headaches.  She has been taking ibuprofen which helps.  The headaches are occurring multiple times a month.  They are not interfering with her daily routines.  There is no nausea or vomiting associated with her headaches.  There is no light sensitivity ordered today seem to exacerbate with noise.    (mom sometimes believes that her daughter over exaggerates things and is not sure if her headaches and knee pain are being used as excuse to get out the household chores)    has a current medication list which includes the following prescription(s): pediatric multivit-minerals-c and acetaminophen.    PAST MEDICAL HISTORY:    Otitis media                                                  Strep throat                                                Family History   Problem Relation Age of Onset   • * Mother         pacemaker   • Systemic Lupus Erythematosus Mother    • Kidney Transplant Mother    • Anxiety disorder Father    • High cholesterol Father    • Alcohol Abuse Maternal Grandfather    • Anxiety  Pt ambulated to bathroom with 1 assist.  Discussed with Dr Deborah Chow regarding removing blood sugar checks, since blood sugars are normal. And requested advance diet. Received orders to advance diet as tolerated. No change in orders for blood sugars. 1200-Starting to wean pt on oxygen, lowered oxygen to 3l/nc. Dr Deborah Chow notified of pt being Covid +.    1600- 2nd dose of actemera given, R arm injection pt received about 80% of it, due to difficulty getting it to inject, pt does not have a lot of fat on her body. Discussed with pharmacy. Oxygen again weaned to 2l/nc. Discussed with pt will continue to attempt to lower oxygen usage in hopes of planning for d/c soon. 1800-Pt resting comfortably, does not like her dinner, attempted to get pt a second tray. disorder Paternal Grandmother    • Anxiety disorder Paternal Grandfather      Review of patient's family status indicates:    Mother                         Alive                     Father                         Alive                     Maternal Grandmother           Alive                     Maternal Grandfather           Alive                     Paternal Grandmother           Alive                     Paternal Grandfather           Alive                       Diet: likes all food groups. Favorite food-teriaki chicken, zesty chicken. 2% milk-0-1 cup daily.  Likes water, juice boxes, occasional soda  Sleep:  Sleeping well through the night.  Sleeps about 9.5 hours per night.    Elimination: daily bm's.  No constipation    SOCIAL:        School achievement/future plan - 6th grade at [employer].  Gets all A's, B's, and a C in math.      Concerns for school performance: None      Family/peer relationships - has a group of friends at school.  Has good family support      Living Arrangements/Lives with: mom, dad, and 1 dog      Sports/activities - drama club, music excel, band-oboe      Screen time: 4-5 hours on a school night.      Mood issues - none    Medical history, surgical history, and family history reviewed and updated.      PHYSICAL EXAM:  Vitals:    11/06/19 1400   BP: 114/70   Pulse: 84   Resp: 20   Temp: 99 °F (37.2 °C)     No exam data present    GENERAL:  Well appearing  female, nontoxic, no acute distress.  Alert and interactive.  SKIN: Warm, normal turgor.  No cyanosis.  No bruises or lesions.  Small (1mm) slightly raised brown nevi to the top of the head.  HEAD:  Normocephalic, atraumatic.  EYES:  Conjunctivae without injection or icterus.  PERRL (pupils equal, round, reactive to light), EOMI (extraocular movements intact).  NOSE: No flaring.  EARS:  TMs (tympanic membranes) transparent with good landmarks.  THROAT:  Oropharynx with moist mucous membranes and no lesions.  NECK:  Supple, no  lymphadenopathy or masses.  HEART:  Regular rate and rhythm.  Quiet precordium.  Normal S1, S2.  No murmurs, rubs, gallops.   LUNGS:  Clear to auscultation bilaterally.  No wheezes, rales, rhonchi.  Normal work of breathing.  ABDOMEN:  Soft, nontender.  No organomegaly or masses.  GENITOURINARY:  Mahad  3     EXTREMITIES:  Warm, dry, without abnormalities.  Ambulating freely without any guarding or limping.  Able to flex and extend the knees without any difficulty no bruising swelling or erythema noted to the knees.  She does have some laxity of the knee cap.  NEUROLOGIC:  Normal tone, bulk, strength.    ASSESSMENT:  12 year old female well child.  Right knee pain  Generalized headache  Benign nevi    PLAN:    All parental concerns and questions discussed.  Advised Mom within a but starts to get irritated from a brushing of the hair coming of the hair we may need to consider Dermatology consult.  In the meantime will continue to monitor on a yearly basis.  Continued stressed the importance of proper stretching exercises to increase flexibility to help minimize her knee pain.  Offered referral to Sports Medicine mom is preferring to wait at this time.  We did address her headaches importance of drawing her headaches when they occur how long they last how the interfere with her daily routines what she does to relieve the headaches.  Possibility of hormonal headaches with the onset of puberty was addressed.  Follow-up of headaches are interfering with school, intensify or increase in frequency.  Anticipatory guidance provided, handout given.              Safety/car/bicycle/fire/sharp objects/falls/water/social media              Development-reviewed school performance, behavior and social relationships    First menses, menstrual hygiene and personnel hygiene discussed.              Discipline-peer pressure     Chores and being responsible for cleaning up after oneself              Diet, portion control, healthy  snacks, avoidance of a sedentary lifestyle.    Modeling healthy eating habits and being active as a family              Television-limit screen time, social media responsibility              Analgesics/antipyretics              Sun exposure-Insect repellent              Tobacco-free home              Dental care    Orders Placed This Encounter   • MENINGOCOCCAL CONJUGATE MCV4O VACC (MENVEO)   • HPV 9 VALENT VACC         Immunizations per orders.  Administered med BUN flu Risks, benefits, and side effects discussed.   Return to clinic in 1 year for well child exam or sooner prn illness/concerns.

## 2020-04-10 NOTE — PROGRESS NOTES
MULU:  1. Home  2. Wean O2  3. OP f/u appts  4. Daughter to transport home      Reason for Admission: Acute hypoxic respiratory failure                     RUR Score:  15                   Plan for utilizing home health: [facility] services are not indicated at this time. PCP: First and Last name:  Dr. Deborah Gatica   Name of Practice: [address] [place] [address] Primary Care   Are you a current patient: Yes/No: Yes   Approximate date of last visit: 2/25/20                    Current Advanced Directive/Advance Care Plan: Full code. No AMD on file. Transition of Care Plan: Home with f/u appts    Initial assessment completed with pt over the phone due to isolation precautions pending COVID-19 results. Pt is a 73 yo female admitted to [address] [place] [address] on 4/8/20 with respiratory failure. Pt is currently requiring supplemental O2 which is not baseline. Pt does not currently have a qualifying diagnosis for home O2, however, if results come back as positive for COVID that will qualify her. No other needs were identified at the time of this assessment. Pt lives alone in a one level private residence with no CONOR. Pt's children (two daughters, one son) live nearby and are supportive. Pt reported to be independent in ADL/IALDs to include driving and declined concern regarding ambulation/mobility. No hx of HH, SNF, or IP Rehab. No DME in the home. Pt is insured with [facility] [employer] Medicare and declined financial/medication coverage concerns. Preferred pharmacy is [facility] on [facility] and [address] [place] [address]. Pt's daughter will be available to transport home when ready. CM will continue to follow and remain available for transitional care planning. Care Management Interventions  PCP Verified by CM: Yes(Dr. Deborah Gatica)  Last Visit to PCP: 02/25/20  Mode of Transport at Discharge:  Other (see comment)(daughter)  Transition of Care Consult (CM Consult): Discharge Planning(initial eval - anticipate home with OP f/u appts)  Sulma Signup: No  Discharge Durable Medical Equipment: No(no reported DME)  Physical Therapy Consult: No  Occupational Therapy Consult: No  Speech Therapy Consult: No  Current Support Network: Lives Alone, Own Home, Family Lives Nearby(pt lives alone in a 1 story home with no CONOR)  Confirm Follow Up Transport: Self  Discharge Location  Discharge Placement: [address] [place] [address], MSW  Care Manager  [phone]

## 2020-04-10 NOTE — PROGRESS NOTES
Bedside shift change report GIVEN TO Hamilton Felton RN. Report included the following information SBAR. SIGNIFICANT CHANGES DURING SHIFT:     Pt dropped to 78% when ambulating on 4L. CONCERNS TO ADDRESS WITH MD:             [facility] NURSING NOTE   Admission Date 4/8/2020   Admission Diagnosis COVID-19 [U07.1, J98.8]   Consults None      Cardiac Monitoring [x] Yes [] No      Purposeful Hourly Rounding [x] Yes    Scott Score Total Score: 1   Scott score 3 or > [x] Bed Alarm [] Avasys [] 1:1 sitter [] Patient refused (Signed refusal form in chart)   Arnel Score Arnel Score: 18   Arnel score 14 or < [] PMT consult [] Wound Care consult    []  Specialty bed  [] Nutrition consult      Influenza Vaccine Received Flu Vaccine for Current Season (usually Sept-March): Yes           Oxygen needs? [] Room air Oxygen @  []1L    []2L    []3L   [x]4L    []5L   []6L via  NC   Chronic home O2 use? [] Yes [] No  Perform O2 challenge test and document in progress note using smartphrase (.Homeoxygen)      Last bowel movement Last Bowel Movement Date: 04/07/20      Urinary Catheter             LDAs               Peripheral IV 04/08/20 Left Antecubital (Active)   Site Assessment Clean, dry, & intact 4/10/2020  6:46 AM   Phlebitis Assessment 0 4/10/2020  6:46 AM   Infiltration Assessment 0 4/10/2020  6:46 AM   Dressing Status Clean, dry, & intact 4/10/2020  6:46 AM   Dressing Type Transparent 4/10/2020  6:46 AM   Hub Color/Line Status Pink 4/10/2020  6:46 AM                         Readmission Risk Assessment Tool Score Low Risk            5       Total Score        5 Pt. Coverage (Medicare=5 , Medicaid, or Self-Pay=4)        Criteria that do not apply:    Has Seen PCP in Last 6 Months (Yes=3, No=0)    . Living with Significant Other. Assisted Living. LTAC. SNF.  or   Rehab    Patient Length of Stay (>5 days = 3)    IP Visits Last 12 Months (1-3=4, 4=9, >4=11)    Charlson Comorbidity Score (Age + Comorbid Conditions) Expected Length of Stay 3d 7h   Actual Length of Stay 2

## 2020-04-11 LAB
ANION GAP SERPL CALC-SCNC: 8 MMOL/L (ref 5–15)
BACTERIA SPEC CULT: NORMAL
BASOPHILS # BLD: 0 K/UL (ref 0–0.1)
BASOPHILS NFR BLD: 0 % (ref 0–1)
BNP SERPL-MCNC: 2283 PG/ML
BUN SERPL-MCNC: 4 MG/DL (ref 6–20)
BUN/CREAT SERPL: 5 (ref 12–20)
CALCIUM SERPL-MCNC: 7.6 MG/DL (ref 8.5–10.1)
CHLORIDE SERPL-SCNC: 116 MMOL/L (ref 97–108)
CO2 SERPL-SCNC: 22 MMOL/L (ref 21–32)
CREAT SERPL-MCNC: 0.85 MG/DL (ref 0.55–1.02)
DIFFERENTIAL METHOD BLD: ABNORMAL
EOSINOPHIL # BLD: 0 K/UL (ref 0–0.4)
EOSINOPHIL NFR BLD: 0 % (ref 0–7)
ERYTHROCYTE [DISTWIDTH] IN BLOOD BY AUTOMATED COUNT: 13 % (ref 11.5–14.5)
GLUCOSE BLD STRIP.AUTO-MCNC: 78 MG/DL (ref 65–100)
GLUCOSE SERPL-MCNC: 94 MG/DL (ref 65–100)
GRAM STN SPEC: NORMAL
HCT VFR BLD AUTO: 26.2 % (ref 35–47)
HGB BLD-MCNC: 8.8 G/DL (ref 11.5–16)
IL6 SERPL-MCNC: 131 PG/ML (ref 0–15.5)
IMM GRANULOCYTES # BLD AUTO: 0 K/UL (ref 0–0.04)
IMM GRANULOCYTES NFR BLD AUTO: 0 % (ref 0–0.5)
LIPASE SERPL-CCNC: 52 U/L (ref 73–393)
LYMPHOCYTES # BLD: 0.7 K/UL (ref 0.8–3.5)
LYMPHOCYTES NFR BLD: 22 % (ref 12–49)
MCH RBC QN AUTO: 31.1 PG (ref 26–34)
MCHC RBC AUTO-ENTMCNC: 33.6 G/DL (ref 30–36.5)
MCV RBC AUTO: 92.6 FL (ref 80–99)
MONOCYTES # BLD: 0.2 K/UL (ref 0–1)
MONOCYTES NFR BLD: 7 % (ref 5–13)
MYELOCYTES NFR BLD MANUAL: 1 %
NEUTS BAND NFR BLD MANUAL: 1 %
NEUTS SEG # BLD: 2.2 K/UL (ref 1.8–8)
NEUTS SEG NFR BLD: 69 % (ref 32–75)
NRBC # BLD: 0 K/UL (ref 0–0.01)
NRBC BLD-RTO: 0 PER 100 WBC
PLATELET # BLD AUTO: 273 K/UL (ref 150–400)
PMV BLD AUTO: 9.4 FL (ref 8.9–12.9)
POTASSIUM SERPL-SCNC: 3.4 MMOL/L (ref 3.5–5.1)
RBC # BLD AUTO: 2.83 M/UL (ref 3.8–5.2)
RBC MORPH BLD: ABNORMAL
SERVICE CMNT-IMP: NORMAL
SERVICE CMNT-IMP: NORMAL
SODIUM SERPL-SCNC: 146 MMOL/L (ref 136–145)
TRIGL SERPL-MCNC: 133 MG/DL (ref ?–150)
WBC # BLD AUTO: 3.2 K/UL (ref 3.6–11)

## 2020-04-11 PROCEDURE — 85025 COMPLETE CBC W/AUTO DIFF WBC: CPT

## 2020-04-11 PROCEDURE — 83880 ASSAY OF NATRIURETIC PEPTIDE: CPT

## 2020-04-11 PROCEDURE — 94760 N-INVAS EAR/PLS OXIMETRY 1: CPT

## 2020-04-11 PROCEDURE — 83690 ASSAY OF LIPASE: CPT

## 2020-04-11 PROCEDURE — 65660000001 HC RM ICU INTERMED STEPDOWN

## 2020-04-11 PROCEDURE — 82962 GLUCOSE BLOOD TEST: CPT

## 2020-04-11 PROCEDURE — 36415 COLL VENOUS BLD VENIPUNCTURE: CPT

## 2020-04-11 PROCEDURE — 80048 BASIC METABOLIC PNL TOTAL CA: CPT

## 2020-04-11 PROCEDURE — 77010033678 HC OXYGEN DAILY

## 2020-04-11 PROCEDURE — 74011250637 HC RX REV CODE- 250/637: Performed by: INTERNAL MEDICINE

## 2020-04-11 PROCEDURE — 84478 ASSAY OF TRIGLYCERIDES: CPT

## 2020-04-11 PROCEDURE — 74011000258 HC RX REV CODE- 258: Performed by: INTERNAL MEDICINE

## 2020-04-11 PROCEDURE — 74011250636 HC RX REV CODE- 250/636: Performed by: INTERNAL MEDICINE

## 2020-04-11 RX ADMIN — Medication 10 ML: at 18:05

## 2020-04-11 RX ADMIN — HYDROXYCHLOROQUINE SULFATE 200 MG: 200 TABLET ORAL at 09:27

## 2020-04-11 RX ADMIN — OXYCODONE HYDROCHLORIDE AND ACETAMINOPHEN 500 MG: 500 TABLET ORAL at 09:27

## 2020-04-11 RX ADMIN — HYDROXYCHLOROQUINE SULFATE 200 MG: 200 TABLET ORAL at 20:25

## 2020-04-11 RX ADMIN — HEPARIN SODIUM 5000 UNITS: 5000 INJECTION INTRAVENOUS; SUBCUTANEOUS at 09:28

## 2020-04-11 RX ADMIN — ONDANSETRON 4 MG: 2 INJECTION INTRAMUSCULAR; INTRAVENOUS at 12:05

## 2020-04-11 RX ADMIN — AZITHROMYCIN MONOHYDRATE 500 MG: 500 INJECTION, POWDER, LYOPHILIZED, FOR SOLUTION INTRAVENOUS at 23:26

## 2020-04-11 RX ADMIN — GUAIFENESIN 600 MG: 600 TABLET, EXTENDED RELEASE ORAL at 20:26

## 2020-04-11 RX ADMIN — Medication 10 ML: at 20:33

## 2020-04-11 RX ADMIN — Medication 10 ML: at 05:33

## 2020-04-11 RX ADMIN — GUAIFENESIN 600 MG: 600 TABLET, EXTENDED RELEASE ORAL at 09:27

## 2020-04-11 RX ADMIN — ZINC SULFATE 220 MG (50 MG) CAPSULE 1 CAPSULE: CAPSULE at 09:27

## 2020-04-11 RX ADMIN — OXYCODONE HYDROCHLORIDE AND ACETAMINOPHEN 500 MG: 500 TABLET ORAL at 18:04

## 2020-04-11 RX ADMIN — CEFTRIAXONE 1 G: 1 INJECTION, POWDER, FOR SOLUTION INTRAMUSCULAR; INTRAVENOUS at 20:27

## 2020-04-11 RX ADMIN — HEPARIN SODIUM 5000 UNITS: 5000 INJECTION INTRAVENOUS; SUBCUTANEOUS at 20:26

## 2020-04-11 NOTE — PROGRESS NOTES
Pt vomiting up breakfast, reporting, \"The pills are making me sick\", voimited up 240 of undigested food. VSS. Medicated with Zofran 4mg iv. Discussed with Dr Seaman Parent about discontinuing iv fluids due to BNP and Edema in R hand    1600-Pt feels better, eating applesauce for dinner. VSS  Pt denies any pain. Pt returned to bed after sitting up almost 4 hours in chair. Continuing to attempt to wean oxygen. Verbal shift change report given to Bruce (oncoming nurse) by Tony Calle (offgoing nurse). Report included the following information SBAR.

## 2020-04-11 NOTE — PROGRESS NOTES
Verbal shift change report given to Bruce (oncoming nurse) by Mika Seymour (offgoing nurse). Report included the following information SBAR.

## 2020-04-11 NOTE — PROGRESS NOTES
Hospitalist Progress Note    NAME: Henrene Kanner   :  1950   MRN:  [id number]       Assessment / Plan:  Acute hypoxic resp failure  Severe sepsis POA  COVID -19 pna  CXR: Minimal, nonspecific opacity in the left midlung and left base as well as  interstitial prominence which can be seen with viral pneumonia.    -maintaining sats on 3L, no prior lung Dz, no prior home O2. Still significant desats (into 70s going to bathroom in room but recovers sats quickly  -follow stress markers  - azithromycinRocephin/Plaq/  - Bcxs NGTD,   - Guaifenesin for cough  - Tylenol for fever  - Pulmonary consult if decomp   vit c/zinc  -may require DC on home O2, walk test prior to DC  Continue with to keep level above 92.  Patient decompensated with transfer to ICU.   -appreciate pulm, awake and sleep proning as able     Gastroenteritis: Likely related to aforementioned viral prodrome,liquid diet advance as tolerated- Zofran prn for nausea     HTN  -restart amlodipine    Stop SSI, not diabetic    Normocytic anemia, baseline Hb around 11, no signs of bleeding  -may be some dilutional component after IVF  -monitor CBC    NAZARIO  -resolved w IVF    Hypokalemia replace as needed check magnesium    Acute renal failure likely prerenal IV fluids recheck labs in the morning  Code Status: Full code  Surrogate Decision Maker:DTR     DVT Prophylaxis: Lovenox   GI Prophylaxis: not indicated     Baseline: ambulatory      Subjective:     Chief Complaint / Reason for Physician Visit  No new complaints, wants to know when she can go home. Maintaining sats on 3L. Not on home O2 prior    Discussed with RN events overnight.      Review of Systems:  Symptom Y/N Comments  Symptom Y/N Comments   Fever/Chills n   Chest Pain n    Poor Appetite    Edema     Cough y   Abdominal Pain n    Sputum n   Joint Pain     SOB/LAING n   Pruritis/Rash     Nausea/vomit n   Tolerating PT/OT     Diarrhea n   Tolerating Diet y    Constipation    Other       Could NOT obtain due to:      Objective:     VITALS:   Last 24hrs VS reviewed since prior progress note. Most recent are:  Patient Vitals for the past 24 hrs:   Temp Pulse Resp BP SpO2   04/11/20 1641 98.1 °F (36.7 °C) 72 18 147/72 95 %   04/11/20 1156 98.4 °F (36.9 °C) 83 16 167/81 91 %   04/11/20 0756 98.4 °F (36.9 °C) 84 18 136/73 94 %   04/11/20 0258 99 °F (37.2 °C) 79 18 133/70 95 %   04/11/20 0002 97.8 °F (36.6 °C) 87 20 151/74 94 %   04/10/20 1949 98.3 °F (36.8 °C) 83 18 149/80 94 %   04/10/20 1650  77 18 139/74 93 %       Intake/Output Summary (Last 24 hours) at 4/11/2020 1647  Last data filed at 4/11/2020 1641  Gross per 24 hour   Intake 600 ml   Output 280 ml   Net 320 ml        PHYSICAL EXAM:  General: WD, WN. Alert, cooperative, no acute distress    EENT:  EOMI. Anicteric sclerae. MMM  Resp:  Diminished air entry, no rales, rhonchi. Speaks full sentences w/o distress. No accessory muscle use  CV:  Regular  rhythm,  No edema  GI:  Soft, Non distended, Non tender.  +Bowel sounds  Neurologic:  Alert and oriented X 3, normal speech,   Psych:   Good insight. Not anxious nor agitated  Skin:  No rashes. No jaundice    Reviewed most current lab test results and cultures  YES  Reviewed most current radiology test results   YES  Review and summation of old records today    NO  Reviewed patient's current orders and MAR    YES  PMH/ reviewed - no change compared to H&P  ________________________________________________________________________  Care Plan discussed with:    Comments   Patient x    Family      RN x    Care Manager     Consultant                        Multidiciplinary team rounds were held today with , nursing, pharmacist and clinical coordinator. Patient's plan of care was discussed; medications were reviewed and discharge planning was addressed.      ________________________________________________________________________  Total NON critical care TIME:  35 Minutes    Total CRITICAL CARE TIME Spent: Minutes non procedure based      Comments   >50% of visit spent in counseling and coordination of care x    ________________________________________________________________________  Stella Layne DO     Procedures: see electronic medical records for all procedures/Xrays and details which were not copied into this note but were reviewed prior to creation of Plan. LABS:  I reviewed today's most current labs and imaging studies.   Pertinent labs include:  Recent Labs     04/11/20 0527 04/09/20  0001   WBC 3.2* 8.0   HGB 8.8* 9.8*   HCT 26.2* 29.2*    217     Recent Labs     04/11/20 0527 04/09/20  0001 04/08/20  2237   * 142  --    K 3.4* 3.9  --    * 115*  --    CO2 22 23  --    GLU 94 107*  --    BUN 4* 13  --    CREA 0.85 1.06*  --    CA 7.6* 7.4*  --    MG  --  1.8  --    ALB  --  2.6*  --    TBILI  --  0.5  --    SGOT  --  43*  --    ALT  --  26  --    INR  --   --  1.0       Signed: Stella Layne DO

## 2020-04-11 NOTE — PROGRESS NOTES
Problem: Pneumonia: Day 4  Goal: Off Pathway (Use only if patient is Off Pathway)  Outcome: Progressing Towards Goal  Goal: Activity/Safety  Outcome: Progressing Towards Goal  Goal: Nutrition/Diet  Outcome: Progressing Towards Goal  Goal: Discharge Planning  Outcome: Progressing Towards Goal  Goal: Medications  Outcome: Progressing Towards Goal  Goal: Respiratory  Outcome: Progressing Towards Goal  Goal: Treatments/Interventions/Procedures  Outcome: Progressing Towards Goal  Goal: Psychosocial  Outcome: Progressing Towards Goal

## 2020-04-12 PROCEDURE — 74011250636 HC RX REV CODE- 250/636: Performed by: INTERNAL MEDICINE

## 2020-04-12 PROCEDURE — 77010033678 HC OXYGEN DAILY

## 2020-04-12 PROCEDURE — 74011250637 HC RX REV CODE- 250/637: Performed by: INTERNAL MEDICINE

## 2020-04-12 PROCEDURE — 74011000258 HC RX REV CODE- 258: Performed by: INTERNAL MEDICINE

## 2020-04-12 PROCEDURE — 65660000001 HC RM ICU INTERMED STEPDOWN

## 2020-04-12 PROCEDURE — 94760 N-INVAS EAR/PLS OXIMETRY 1: CPT

## 2020-04-12 RX ORDER — POTASSIUM CHLORIDE 750 MG/1
40 TABLET, FILM COATED, EXTENDED RELEASE ORAL ONCE
Status: COMPLETED | OUTPATIENT
Start: 2020-04-12 | End: 2020-04-12

## 2020-04-12 RX ORDER — AMLODIPINE BESYLATE 5 MG/1
5 TABLET ORAL DAILY
Status: DISCONTINUED | OUTPATIENT
Start: 2020-04-12 | End: 2020-04-13 | Stop reason: HOSPADM

## 2020-04-12 RX ADMIN — CEFTRIAXONE 1 G: 1 INJECTION, POWDER, FOR SOLUTION INTRAMUSCULAR; INTRAVENOUS at 22:55

## 2020-04-12 RX ADMIN — ZINC SULFATE 220 MG (50 MG) CAPSULE 1 CAPSULE: CAPSULE at 09:39

## 2020-04-12 RX ADMIN — HYDROXYCHLOROQUINE SULFATE 200 MG: 200 TABLET ORAL at 22:54

## 2020-04-12 RX ADMIN — HYDROXYCHLOROQUINE SULFATE 200 MG: 200 TABLET ORAL at 09:39

## 2020-04-12 RX ADMIN — Medication 10 ML: at 13:31

## 2020-04-12 RX ADMIN — GUAIFENESIN 600 MG: 600 TABLET, EXTENDED RELEASE ORAL at 09:39

## 2020-04-12 RX ADMIN — GUAIFENESIN 600 MG: 600 TABLET, EXTENDED RELEASE ORAL at 22:59

## 2020-04-12 RX ADMIN — ONDANSETRON 4 MG: 2 INJECTION INTRAMUSCULAR; INTRAVENOUS at 06:07

## 2020-04-12 RX ADMIN — Medication 10 ML: at 06:08

## 2020-04-12 RX ADMIN — HEPARIN SODIUM 5000 UNITS: 5000 INJECTION INTRAVENOUS; SUBCUTANEOUS at 09:40

## 2020-04-12 RX ADMIN — POTASSIUM CHLORIDE 40 MEQ: 750 TABLET, FILM COATED, EXTENDED RELEASE ORAL at 06:09

## 2020-04-12 RX ADMIN — HEPARIN SODIUM 5000 UNITS: 5000 INJECTION INTRAVENOUS; SUBCUTANEOUS at 22:55

## 2020-04-12 RX ADMIN — AMLODIPINE BESYLATE 5 MG: 5 TABLET ORAL at 09:39

## 2020-04-12 RX ADMIN — AZITHROMYCIN MONOHYDRATE 500 MG: 500 INJECTION, POWDER, LYOPHILIZED, FOR SOLUTION INTRAVENOUS at 22:57

## 2020-04-12 RX ADMIN — OXYCODONE HYDROCHLORIDE AND ACETAMINOPHEN 500 MG: 500 TABLET ORAL at 17:20

## 2020-04-12 RX ADMIN — Medication 10 ML: at 22:59

## 2020-04-12 RX ADMIN — OXYCODONE HYDROCHLORIDE AND ACETAMINOPHEN 500 MG: 500 TABLET ORAL at 09:39

## 2020-04-12 NOTE — PROGRESS NOTES
Problem: Risk for Spread of Infection  Goal: Prevent transmission of infectious organism to others  Description: Prevent the transmission of infectious organisms to other patients, staff members, and visitors.   Outcome: Progressing Towards Goal     Problem: Patient Education:  Go to Education Activity  Goal: Patient/Family Education  Outcome: Progressing Towards Goal     Problem: Patient Education: Go to Patient Education Activity  Goal: Patient/Family Education  Outcome: Progressing Towards Goal     Problem: Pneumonia: Day 4  Goal: Treatments/Interventions/Procedures  Outcome: Progressing Towards Goal

## 2020-04-12 NOTE — PROGRESS NOTES
Spiritual Care Assessment/Progress Note  [facility]      NAME: Anuj Cole      MRN: [id number]  AGE: 72 y.o.  SEX: female  Islam Affiliation: Sikhism   Language: English     4/12/2020     Total Time (in minutes): 25     Spiritual Assessment begun in [facility] through conversation with:         [x]Patient        [] Family    [] Friend(s)        Reason for Consult: Initial/Spiritual assessment, patient floor     Spiritual beliefs: (Please include comment if needed)     [x] Identifies with a melissa tradition:         [x] Supported by a melissa community:            [] Claims no spiritual orientation:           [] Seeking spiritual identity:                [] Adheres to an individual form of spirituality:           [] Not able to assess:                           Identified resources for coping:      [x] Prayer                               [x] Music                  [] Guided Imagery     [x] Family/friends                 [] Pet visits     [] Devotional reading                         [] Unknown     [] Other:                                               Interventions offered during this visit: (See comments for more details)    Patient Interventions: Affirmation of emotions/emotional suffering, Affirmation of melissa, Catharsis/review of pertinent events in supportive environment, Coping skills reviewed/reinforced, Crisis, End of life issues discussed, Iconic (affirming the presence of God/Higher Power), Initial/Spiritual assessment, patient floor, Life review/legacy, Normalization of emotional/spiritual concerns, Prayer (actual), Islam beliefs/image of God discussed           Plan of Care:     [] Support spiritual and/or cultural needs    [] Support AMD and/or advance care planning process      [] Support grieving process   [] Coordinate Rites and/or Rituals    [] Coordination with community clergy   [] No spiritual needs identified at this time   [] Detailed Plan of Care below (See Comments)  [] Make referral to Music Therapy  [] Make referral to Pet Therapy     [] Make referral to Addiction services  [] Make referral to [facility]  [] Make referral to Spiritual Care Partner  [] No future visits requested        [x] Follow up visits as needed     Comments: The purpose of the visit was in response to a staff request, since the patient's  had  earlier. The patient mentioned that she places everything in God's hand. She mentioned that she is supported by her family and her community of melissa. She expressed missing her  but she knows he is in a better place. Patient mentioned how grateful she is for the med team here and how much she appreciates them for their hard work. She expressed her desire to go home soon, and she hopes it will be as early as tomorrow. The  listened reflectively and empathetically. The  also facilitated story-telling. [address] [facility] Roman Romano.    paging service [phone])

## 2020-04-12 NOTE — PROGRESS NOTES
Home Oxygen Test  Date of test: 4/12/2020  Time of test: 1436    Sa02 88 % on room air AT REST. Sa02 81 % on room air DURING AMBULATION. Sa02 84 % on 1 Liters DURING AMBULATION. Sa02 87 % on 2 Liters DURING AMBULATION. Sa02 91 % on 3 Liters DURING AMBULATION. Sa02 93 % on 3 Liters AT REST/AFTER AMBULATION.

## 2020-04-12 NOTE — PROGRESS NOTES
Verbal shift change report given to Jonathan Paul (oncoming nurse) by Andrea Pavon (offgoing nurse). Report included the following information SBAR.     1021 - QTc 0.38 s    1314 - Per pt RN does not need to call family with updates    46 - Dr. Nena Goldberg aware of O2 challenge. Verbal shift change report GIVEN TO Naz Mahmood, RN. Report included the following information SBAR. SIGNIFICANT CHANGES DURING SHIFT:  Oxygen challenge completed, pt on 3L NC. Bathed with CHG. CONCERNS TO ADDRESS WITH MD:            [address] NURSING NOTE   Admission Date 4/8/2020   Admission Diagnosis COVID-19 [U07.1, J98.8]   Consults None      Cardiac Monitoring [x] Yes [] No      Purposeful Hourly Rounding [x] Yes    Scott Score Total Score: 2   Scott score 3 or > [] Bed Alarm [] Avasys [] 1:1 sitter [] Patient refused (Signed refusal form in chart)   Arnel Score Arnel Score: 19   Arnel score 14 or < [] PMT consult [] Wound Care consult    []  Specialty bed  [] Nutrition consult      Influenza Vaccine Received Flu Vaccine for Current Season (usually Sept-March): Yes           Oxygen needs? [] Room air Oxygen @  []1L    []2L    [x]3L   []4L    []5L   []6L via  NC   Chronic home O2 use?  [] Yes [x] No  Perform O2 challenge test and document in progress note using smartphrase (.Homeoxygen)      Last bowel movement Last Bowel Movement Date: 04/11/20      Urinary Catheter             LDAs               Peripheral IV 04/08/20 Left Antecubital (Active)   Site Assessment Clean, dry, & intact 4/12/2020  1:31 PM   Phlebitis Assessment 0 4/12/2020  1:31 PM   Infiltration Assessment 0 4/12/2020  1:31 PM   Dressing Status Clean, dry, & intact 4/12/2020  1:31 PM   Dressing Type Transparent;Tape 4/12/2020  1:31 PM   Hub Color/Line Status Pink;Flushed;Patent 4/12/2020  1:31 PM       Peripheral IV 04/08/20 Anterior;Proximal;Right Forearm (Active)   Site Assessment Clean, dry, & intact 4/12/2020  1:31 PM   Phlebitis Assessment 0 4/12/2020  1:31 PM Infiltration Assessment 0 4/12/2020  1:31 PM   Dressing Status Clean, dry, & intact 4/12/2020  1:31 PM   Dressing Type Transparent;Tape 4/12/2020  1:31 PM   Hub Color/Line Status Pink;Flushed;Patent 4/12/2020  1:31 PM                         Readmission Risk Assessment Tool Score Low Risk            5       Total Score        5 Pt. Coverage (Medicare=5 , Medicaid, or Self-Pay=4)        Criteria that do not apply:    Has Seen PCP in Last 6 Months (Yes=3, No=0)    . Living with Significant Other. Assisted Living. LTAC. SNF.  or   Rehab    Patient Length of Stay (>5 days = 3)    IP Visits Last 12 Months (1-3=4, 4=9, >4=11)    Charlson Comorbidity Score (Age + Comorbid Conditions)       Expected Length of Stay 3d 7h   Actual Length of Stay 4

## 2020-04-13 VITALS
WEIGHT: 111.4 LBS | SYSTOLIC BLOOD PRESSURE: 141 MMHG | RESPIRATION RATE: 20 BRPM | DIASTOLIC BLOOD PRESSURE: 80 MMHG | TEMPERATURE: 98.7 F | BODY MASS INDEX: 19.74 KG/M2 | OXYGEN SATURATION: 97 % | HEART RATE: 75 BPM | HEIGHT: 63 IN

## 2020-04-13 LAB
M TB IFN-G BLD-IMP: NEGATIVE
QUANTIFERON CRITERIA, QFI1T: NORMAL
QUANTIFERON MITOGEN VALUE: 8.44 IU/ML
QUANTIFERON NIL VALUE: 0.05 IU/ML
QUANTIFERON TB1 AG: 0.05 IU/ML
QUANTIFERON TB2 AG: 0.04 IU/ML

## 2020-04-13 PROCEDURE — 74011250637 HC RX REV CODE- 250/637: Performed by: INTERNAL MEDICINE

## 2020-04-13 PROCEDURE — 94760 N-INVAS EAR/PLS OXIMETRY 1: CPT

## 2020-04-13 PROCEDURE — 74011250636 HC RX REV CODE- 250/636: Performed by: INTERNAL MEDICINE

## 2020-04-13 PROCEDURE — 77010033678 HC OXYGEN DAILY

## 2020-04-13 RX ADMIN — Medication 10 ML: at 06:26

## 2020-04-13 RX ADMIN — OXYCODONE HYDROCHLORIDE AND ACETAMINOPHEN 500 MG: 500 TABLET ORAL at 08:30

## 2020-04-13 RX ADMIN — GUAIFENESIN 600 MG: 600 TABLET, EXTENDED RELEASE ORAL at 08:30

## 2020-04-13 RX ADMIN — AMLODIPINE BESYLATE 5 MG: 5 TABLET ORAL at 08:30

## 2020-04-13 RX ADMIN — HYDROXYCHLOROQUINE SULFATE 200 MG: 200 TABLET ORAL at 10:08

## 2020-04-13 RX ADMIN — ZINC SULFATE 220 MG (50 MG) CAPSULE 1 CAPSULE: CAPSULE at 08:30

## 2020-04-13 RX ADMIN — HEPARIN SODIUM 5000 UNITS: 5000 INJECTION INTRAVENOUS; SUBCUTANEOUS at 10:12

## 2020-04-13 NOTE — DISCHARGE SUMMARY
Hospitalist Discharge Summary     Patient ID:  Minnie Alcazar  [id number]  72 y.o.  1950    PCP on record: Concepcion Grant MD    Admit date: 2020  Discharge date and time: 2020    DISCHARGE DIAGNOSIS:    See below    CONSULTATIONS:  None    Excerpted HPI from H&P of Christen Brooks MD:  Minnie Alcazar is a 72 y.o. black female with a history of hypertension who presents complaining of fatigue increased weakness cough over the past 3 to 4 days she does have nausea and some mild vomiting but feeling better now she wants to eat poor p.o. intake no diarrhea no abdominal pain no fevers daughter works at [employer] apparently she was tested positive for K[place]it. No chest pain no difficulty breathing no leg pain.  ______________________________________________________________________  DISCHARGE SUMMARY/HOSPITAL COURSE:  for full details see H&P, daily progress notes, labs, consult notes. Acute hypoxic resp failure  Severe sepsis POA  COVID -19 pna  CXR: Minimal, nonspecific opacity in the left midlung and left base as well as  interstitial prominence which can be seen with viral pneumonia.    -maintaining sats on 3L, no prior lung Dz, no prior home O2.  Still significant desats (into 70s going to bathroom in room but recovers sats quickly  -follow stress markers  -completed azithromycinRocephin/Plaq/  - Bcxs NGTD,   - Guaifenesin for cough  - Tylenol for fever, none recently   vit c/zinc  -anticipate will need O2 on DC     Grieving  -spouse was inpatient for COVID treatment and  today  -discussed her loss and provided emotional support  -pt is tearful but grieving appropriately     Gastroenteritis: Likely related to aforementioned viral prodrome,liquid diet advance as tolerated- Zofran prn for nausea     HTN  -restart amlodipine     Stop SSI, not diabetic     Normocytic anemia, baseline Hb around 11, no signs of bleeding  -may be some dilutional component after IVF  -monitor CBC     NAZARIO  -resolved w IVF     Hypokalemia replace as needed check magnesium  _______________________________________________________________________  Patient seen and examined by me on discharge day. Pertinent Findings:  Gen:    Not in distress  Chest: Clear lungs  CVS:   Regular rhythm. No edema  Abd:  Soft, not distended, not tender  Neuro:  Alert, oriented x 3  _______________________________________________________________________  DISCHARGE MEDICATIONS:   Current Discharge Medication List      CONTINUE these medications which have NOT CHANGED    Details   amLODIPine (NORVASC) 5 mg tablet Take 1 Tab by mouth daily. Qty: 90 Tab, Refills: 3    Associated Diagnoses: Essential hypertension               Patient Follow Up Instructions: Activity: Light activity as tolerated,   Diet: Resume previous diet  Wound Care: None needed    Follow-up as below  Follow-up tests/labs none pending  Follow-up Information     Follow up With Specialties Details Why Contact Danika Menchaca MD Internal Medicine Go on 4/20/2020 For [facility] follow up appointment at 1:15PM  [address] [place] [address] 285 [phone]      [facility]  Your home oxygen supplier. Please call this number when you get home so they can deliver a concentrator to the home.  [address] [facility] [address] [phone]        ________________________________________________________________    Risk of deterioration: Moderate    Condition at Discharge:  Stable  __________________________________________________________________    Disposition  Home with family, no needs    ____________________________________________________________________    Code Status: Full Code  ___________________________________________________________________      Total time in minutes spent coordinating this discharge (includes going over instructions, follow-up, prescriptions, and preparing report for sign off to her PCP) :  35 minutes    Signed:  Casi Martinez DO

## 2020-04-13 NOTE — DISCHARGE INSTRUCTIONS

## 2020-04-13 NOTE — PROGRESS NOTES
Problem: Risk for Spread of Infection  Goal: Prevent transmission of infectious organism to others  Description: Prevent the transmission of infectious organisms to other patients, staff members, and visitors.   Outcome: Progressing Towards Goal     Problem: Patient Education:  Go to Education Activity  Goal: Patient/Family Education  Outcome: Progressing Towards Goal

## 2020-04-13 NOTE — PROGRESS NOTES
Bedside and Verbal shift change report GIVEN TO Kitty Sarkar RN. Report included the following information SBAR, Kardex, ED Summary, Procedure Summary, Intake/Output, MAR, Recent Results and Cardiac Rhythm (NSR). [facility] NURSING NOTE   Admission Date 4/8/2020   Admission Diagnosis COVID-19 [U07.1, J98.8]   Consults None      Cardiac Monitoring [x] Yes [] No      Purposeful Hourly Rounding [x] Yes    Scott Score Total Score: 2   Scott score 3 or > [] Bed Alarm [] Avasys [] 1:1 sitter [] Patient refused (Signed refusal form in chart)   Arnel Score Arnel Score: 19   Arnel score 14 or < [] PMT consult [] Wound Care consult    []  Specialty bed  [] Nutrition consult      Influenza Vaccine Received Flu Vaccine for Current Season (usually Sept-March): Yes           Oxygen needs? [] Room air Oxygen @  []1L    []2L    [x]3L   []4L    []5L   []6L via  NC   Chronic home O2 use?  [] Yes [x] No  Perform O2 challenge test and document in progress note using smartphrase (.Homeoxygen)      Last bowel movement Last Bowel Movement Date: 04/12/20      Urinary Catheter             LDAs               Peripheral IV 04/08/20 Left Antecubital (Active)   Site Assessment Clean, dry, & intact 4/13/2020  4:55 AM   Phlebitis Assessment 0 4/13/2020  4:55 AM   Infiltration Assessment 0 4/13/2020  4:55 AM   Dressing Status Clean, dry, & intact 4/13/2020  4:55 AM   Dressing Type Transparent 4/13/2020  4:55 AM   Hub Color/Line Status Pink;Flushed 4/13/2020  4:55 AM       Peripheral IV 04/08/20 Anterior;Proximal;Right Forearm (Active)   Site Assessment Clean, dry, & intact 4/13/2020  4:55 AM   Phlebitis Assessment 0 4/13/2020  4:55 AM   Infiltration Assessment 0 4/13/2020  4:55 AM   Dressing Status Clean, dry, & intact 4/13/2020  4:55 AM   Dressing Type Transparent 4/13/2020  4:55 AM   Hub Color/Line Status Pink;Flushed 4/13/2020  4:55 AM                         Readmission Risk Assessment Tool Score Low Risk            5       Total Score 5 Pt. Coverage (Medicare=5 , Medicaid, or Self-Pay=4)        Criteria that do not apply:    Has Seen PCP in Last 6 Months (Yes=3, No=0)    . Living with Significant Other. Assisted Living. LTAC. SNF.  or   Rehab    Patient Length of Stay (>5 days = 3)    IP Visits Last 12 Months (1-3=4, 4=9, >4=11)    Charlson Comorbidity Score (Age + Comorbid Conditions)       Expected Length of Stay 3d 7h   Actual Length of Stay 5

## 2020-04-13 NOTE — ROUTINE PROCESS
The following appointments have been successfully scheduled: 
 
Date/time Monday, April 20, 2020 01:15 PM 
Patient  Ronni Murray 1950 (81UP F) #[id number] A#[id number] [facility] OFFICE-PCP Appointment type Virtual Visit Special Case 15 Provider Sapna Barajas Appointment type notes Virtual Visit Special Case 15 
for visits related to COVID

## 2020-04-13 NOTE — PROGRESS NOTES
MUUL:  1. Home  2. Home O2  3. OP f/u appts  4. Daughter to transport home    -  Referral sent to [address] for home O2. Testing completed yesterday. COVID-19 positive is qualifying diagnosis. -  Per chart review, pt's  passed away over the weekend. Pastoral Care providing support and pt reports to have supportive family. Boby Napier, MSW  Care Manager  [phone]      UPDATE 11:40 AM  -  Portable O2 delivered by [facility]. CM delivered to RN to give to pt. CM spoke with pt over the phone and instructed pt to call [address] once she has returned home so they can deliverer the concentrator.   -  Daughter to transport once discharge instructions have been reviewed with pt. -  PCP virtual f/u scheduled for 4/20/20. Medicare pt has received, reviewed, and signed 2nd IM letter informing them of their right to appeal the discharge. Signed copy has been placed on pt bedside chart. - VERBAL CONSENT OBTAINED    No further concerns indicated at this time. AVS updated. Patient is ready for discharge from a Care Management standpoint. Care Management Interventions  PCP Verified by CM: Yes(Dr. Kitty Neff)  Last Visit to PCP: 02/25/20  Mode of Transport at Discharge: Other (see comment)(daughter)  Hospital Transport Time of Discharge: 1300  Transition of Care Consult (CM Consult):  Other(Home with OP f/u appt)  MyChart Signup: No  Discharge Durable Medical Equipment: Yes(Home O2 ordered through [facility])  Physical Therapy Consult: No  Occupational Therapy Consult: No  Speech Therapy Consult: No  Current Support Network: Lives Alone, Own Home, Family Lives Nearby(pt lives alone in a 1 story home with no CONOR)  Confirm Follow Up Transport: Self  Discharge Location  Discharge Placement: Home with outpatient services

## 2020-04-13 NOTE — PROGRESS NOTES
Hospitalist Progress Note    NAME: Anita Michaels   :  1950   MRN:  [id number]       Assessment / Plan:  Acute hypoxic resp failure  Severe sepsis POA  COVID -19 pna  CXR: Minimal, nonspecific opacity in the left midlung and left base as well as  interstitial prominence which can be seen with viral pneumonia.    -maintaining sats on 3L, no prior lung Dz, no prior home O2. Still significant desats (into 70s going to bathroom in room but recovers sats quickly  -follow stress markers  -completed azithromycinRocephin/Plaq/  - Bcxs NGTD,   - Guaifenesin for cough  - Tylenol for fever, none recently   vit c/zinc  -anticipate will need O2 on DC    Grieving  -spouse was inpatient for COVID treatment and  today  -discussed her loss and provided emotional support  -pt is tearful but grieving appropriately     Gastroenteritis: Likely related to aforementioned viral prodrome,liquid diet advance as tolerated- Zofran prn for nausea     HTN  -restart amlodipine    Stop SSI, not diabetic    Normocytic anemia, baseline Hb around 11, no signs of bleeding  -may be some dilutional component after IVF  -monitor CBC    NAZARIO  -resolved w IVF    Hypokalemia replace as needed check magnesium    Acute renal failure likely prerenal IV fluids recheck labs in the morning  Code Status: Full code  Surrogate Decision Maker:DTR     DVT Prophylaxis: Lovenox   GI Prophylaxis: not indicated     Baseline: ambulatory      Subjective:     Chief Complaint / Reason for Physician Visit  Patient still requiring 3L NC to maintain sats, tearful d/t loss of her spouse, grieving appropriately. Provided support    Discussed with RN events overnight.      Review of Systems:  Symptom Y/N Comments  Symptom Y/N Comments   Fever/Chills n   Chest Pain n    Poor Appetite    Edema     Cough y   Abdominal Pain n    Sputum n   Joint Pain     SOB/LIANG n   Pruritis/Rash     Nausea/vomit n   Tolerating PT/OT     Diarrhea n   Tolerating Diet y    Constipation Other       Could NOT obtain due to:      Objective:     VITALS:   Last 24hrs VS reviewed since prior progress note. Most recent are:  Patient Vitals for the past 24 hrs:   Temp Pulse Resp BP SpO2   04/12/20 2000 98.7 °F (37.1 °C) 70 18 155/70 97 %   04/12/20 1458 99.2 °F (37.3 °C) 78 18 151/65 96 %   04/12/20 1443     93 %   04/12/20 1436     91 %   04/12/20 1330     97 %   04/12/20 1124 99 °F (37.2 °C) 82 16 139/78 95 %   04/12/20 0739 98.8 °F (37.1 °C) 75 18 153/82 92 %   04/12/20 0558 98 °F (36.7 °C) 80 20 155/80 92 %   04/11/20 2327 98.3 °F (36.8 °C) 73 18 143/77        Intake/Output Summary (Last 24 hours) at 4/12/2020 2202  Last data filed at 4/12/2020 1508  Gross per 24 hour   Intake 360 ml   Output    Net 360 ml        PHYSICAL EXAM:  General: WD, WN. Alert, cooperative, no acute distress    EENT:  EOMI. Anicteric sclerae. MMM  Resp:  Diminished air entry, no rales, rhonchi. Speaks full sentences w/o distress. No accessory muscle use  CV:  Regular  rhythm,  No edema  GI:  Soft, Non distended, Non tender.  +Bowel sounds  Neurologic:  Alert and oriented X 3, normal speech,   Psych:   Good insight. Not anxious nor agitated  Skin:  No rashes. No jaundice    Reviewed most current lab test results and cultures  YES  Reviewed most current radiology test results   YES  Review and summation of old records today    NO  Reviewed patient's current orders and MAR    YES  PMH/SH reviewed - no change compared to H&P  ________________________________________________________________________  Care Plan discussed with:    Comments   Patient x    Family      RN x    Care Manager     Consultant                        Multidiciplinary team rounds were held today with , nursing, pharmacist and clinical coordinator. Patient's plan of care was discussed; medications were reviewed and discharge planning was addressed.      ________________________________________________________________________  Total NON critical care TIME:  35 Minutes    Total CRITICAL CARE TIME Spent:   Minutes non procedure based      Comments   >50% of visit spent in counseling and coordination of care x    ________________________________________________________________________  Caleb Finch DO     Procedures: see electronic medical records for all procedures/Xrays and details which were not copied into this note but were reviewed prior to creation of Plan. LABS:  I reviewed today's most current labs and imaging studies.   Pertinent labs include:  Recent Labs     04/11/20 0527   WBC 3.2*   HGB 8.8*   HCT 26.2*        Recent Labs     04/11/20 0527   *   K 3.4*   *   CO2 22   GLU 94   BUN 4*   CREA 0.85   CA 7.6*       Signed: Caleb Finch DO

## 2020-04-14 ENCOUNTER — PATIENT OUTREACH (OUTPATIENT)
Dept: FAMILY MEDICINE CLINIC | Age: 70
End: 2020-04-14

## 2020-04-14 LAB
BACTERIA SPEC CULT: NORMAL
BACTERIA SPEC CULT: NORMAL
SERVICE CMNT-IMP: NORMAL
SERVICE CMNT-IMP: NORMAL

## 2020-04-14 NOTE — PROGRESS NOTES
COVID-19 Screening Initial Follow-up Note    Patient contacted regarding COVID-19 diagnosis. Care Transition Nurse/ Ambulatory Care Manager contacted the family by telephone to perform post discharge assessment. Verified name and  with family as identifiers. Provided introduction to self, and explanation of the CTN/ACM role, and reason for call due to risk factors for infection and/or exposure to COVID-19. Symptoms reviewed with family who verbalized the following symptoms: fatigue, cough, no new/worsening symptoms       Due to no new or worsening symptoms encounter was not routed to provider for escalation. Patient has following risk factors of: age, COVID 23, reactive airway disease. CTN/ACM reviewed discharge instructions, medical action plan and red flags such as increased shortness of breath, increasing fever and signs of decompensation with family who verbalized understanding. Discussed exposure protocols and quarantine with CDC Guidelines What to do if you are sick with coronavirus disease 2019.  Family was given an opportunity for questions and concerns. The family agrees to contact the [facility] exposure line [phone], [facility] department [facility]  ([phone] and PCP office for questions related to their healthcare. CTN/ACM provided contact information for future reference. Reviewed and educated family on any new and changed medications related to discharge diagnosis. Patient/family/caregiver given information for [employer] and agrees to enroll yes  Patient's preferred e-mail:  [email or web address]  Patient's preferred phone number: [phone]  Based on Loop alert triggers, patient will be contacted by nurse care manager for worsening symptoms. Pt will be further monitored by COVID Loop Team based on severity of symptoms and risk factors.

## 2020-04-14 NOTE — CDMP QUERY
Good Morning, Patient was admitted With Pneumonia/COVID-19 . According to the lab values and vital signs documented within the patient's medical record the patient may meet criteria for Sepsis. If possible please document within the progress note and discharge if you are evaluating and/or treating any of the following: 
 
==>Sepsis with Severe Sepsis 2/2 Pneumonia/COVID-19 POA 
==>Sepsis 2/2 Pneumonia/COVID-19 POA 
==>No Sepsis 
==>Other unspecified 
==>Unable to determine Risk Factors: 62 Yr F admitted with Pneumonia with COVID-19 Clinical Criteria: Patient arrived to the ED with c/o cough, chills, fatigue, fever, N/V and weakness. Daughter tested positive for COVID-19. CXR revealed Minimal, nonspecific opacity in the left midlung and left base as well as interstitial prominence which can be seen with viral pneumonia. Upon arrival to the ED the patient had the following SIRS:  and 86% on RA with a RR of 24. Two days after admission patient's WBC dropped to 3.2. Patient had low grade temp 100.2. Patient's VRILS-42 came back Positive on 4/10. Patient had noted NAZARIO with a BUN 16 and Creatinine 1.40 with respiratory failure. Treatment: Daily CBC/BMP, CXR, oxygen titration, frequent vital signs/monitoring, COVID-19 swab, Zithromax 500 mg IV once then Q 24 hrs, Rocephin 2 G IV once then 1 G IV Q 24 hrs, 2,000 ML NS IVF boluses and NS IVF infusion at 50 ML/HR. [facility] you, [facility] Thank you, [facility]ann [facility] Sepsis  (BSV Approved definition) Documented Source of suspected or confirmed infection as manifested by 2 or more of the following, not easily explained by another co-existing condition: 
Temperature:  >38C (100.9F)   -OR-  <36C  (96.8F) Heart Rate:  > 90 bpm 
Respiratory Rate:  > 20 / min  -OR-  PaCO2 < 32 WBC:  > 12K  -OR- < 4K  -OR- Bands > 10 Explicitly link all related/associated Acute Organ Dysfunction to Sepsis: Encephalopathy Sepsis-induced Hypotension (or)  Septic Shock [SBP < 90 (or) MAP <65 (or) SBP drop > 40 points from baseline] Hypoxemia (or)  Acute Respiratory Failure [need for invasive or non-invasive ventilation OR- pO2 < 60 mmHg] Acute Kidney Injury (or) Acute Renal Failure OR- Oliguria [serum Creatinine > 2.0 OR- Urine Output < 0.5ml/kg/hr for 2 hours] Ileus (absent bowel sounds) Coagulopathy  DIC  Thrombocytopenia [Platelet Count < 803,130 OR- INR > 1.5 OR- aPTT >60 sec] Hyperbilirubinemia     [Bilirubin > 2 mg/dL] Hyperlactatemia   [Lactic Acid > 2.0] Critical-Illness Myopathy or Polyneuropathy Severe Sepsis = Sepsis with associated Acute Organ Dysfunction Septic Shock = Refractory hypotension refractory to aggressive volume replacement and often requiring vasopressor therapy like dopamine  -OR-  Lactic Acidosis  [Lactic Acid > 4.0].

## 2020-04-20 ENCOUNTER — VIRTUAL VISIT (OUTPATIENT)
Dept: INTERNAL MEDICINE CLINIC | Age: 70
End: 2020-04-20

## 2020-04-20 DIAGNOSIS — I10 ESSENTIAL HYPERTENSION: ICD-10-CM

## 2020-04-20 DIAGNOSIS — D75.A G6PD DEFICIENCY: ICD-10-CM

## 2020-04-20 DIAGNOSIS — U07.1 COVID-19: Primary | ICD-10-CM

## 2020-04-20 DIAGNOSIS — J96.01 ACUTE RESPIRATORY FAILURE WITH HYPOXIA (HCC): ICD-10-CM

## 2020-04-20 DIAGNOSIS — J18.9 PNEUMONITIS: ICD-10-CM

## 2020-04-20 NOTE — PROGRESS NOTES
Chief Complaint   Patient presents with    Transfer Of Care     Patient admitted to [facility] on 4/8/2020 for weakness, cough, low O2 reading, and contact to person who tested positive for COVID-19.     1. Have you been to the ER, urgent care clinic since your last visit? Hospitalized since your last visit? Yes When: 4/8/2020 Where: [facility]  Reason for visit: weakness, cough, low O2 reading, contact with person who tested positive for COVID-19.    2. Have you seen or consulted any other health care providers outside of the [address] [place] [address] since your last visit? Include any pap smears or colon screening.  No

## 2020-04-26 PROBLEM — J18.9 PNEUMONITIS: Status: ACTIVE | Noted: 2020-04-26

## 2020-04-26 PROBLEM — J96.01 ACUTE RESPIRATORY FAILURE WITH HYPOXIA (HCC): Status: ACTIVE | Noted: 2020-04-26

## 2020-04-26 NOTE — PROGRESS NOTES
Jolie Portillo is a 72 y.o. female who was seen by synchronous (real-time) audio-video technology on 4/20/2020. Consent: Jolie Portillo, who was seen by synchronous (real-time) audio-video technology, and/or her healthcare decision maker, is aware that this patient-initiated, Telehealth encounter on 4/20/2020 is a billable service, with coverage as determined by her insurance carrier. She is aware that she may receive a bill and has provided verbal consent to proceed: Yes. Assessment & Plan:   Diagnoses and all orders for this visit:    1. COVID-19    2. Pneumonitis    3. Acute respiratory failure with hypoxia (HCC)    4. G6PD deficiency    5. Essential hypertension    1. Patient was hospitalized for approximately 4 to 5 days for COVID infection complicated by pneumonia. She states that she feels significantly better currently. She is being cared for by her daughter. 2.  She did develop a viral pneumonia related to the COVID virus leading to respiratory failure and need for oxygen therapy. Fortunately there was no progression of her respiratory status to ARDS. She remains on O2 at 3 L/min. I anticipate this being transient. No chest x-ray was done prior to discharge. 3.  She has a chronic anemia secondary to G6PD deficiency and nothing need be done. 4.  She will resume her antihypertensive medication within the next 1 to 2 weeks. I spent at least 25 minutes on this visit with this established patient. Subjective:   Jolie Portillo is a 72 y.o. female who was seen for Transfer Of Care ([facility] for weakness, cough, low O2 reading, and contact to person who tested positive for COVID-19.)  The patient was hospitalized most recently for a COVID infection complicated by respiratory failure from the viral pneumonitis. Fortunately no progression occurred but she was discharged on oxygen in view of the persistent hypoxemia. She is now on 3 L/min.   There was no antecedent lung disease present. She is worked in sterile environments for years and there was no history of cigarette smoking other than passive from her former . Her GI symptoms have abated and her appetite has improved significantly. 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 Her disease was quite mild. She has a past history of anemia and hypertension. Prior to Admission medications    Medication Sig Start Date End Date Taking? Authorizing Provider   amLODIPine (NORVASC) 5 mg tablet Take 1 Tab by mouth daily. 3/16/20  Yes Issac Aguiar MD     No Known Allergies    No Known Allergies  Past Medical History:   Diagnosis Date    Pneumonia 04/01/2010     Past Surgical History:   Procedure Laterality Date    COLONOSCOPY N/A 5/23/2016    COLONOSCOPY performed by Live Quinones MD at [facility] HX BREAST BIOPSY Right     Benign. Patient thinks biopsy was on rt breast lower areolar area. Along time ago.  HX GI      colonscopy - about 2011    HX GYN      hysterectomy    HX HYSTERECTOMY      HX ORTHOPAEDIC      surgery for broken foot - right     Family History   Problem Relation Age of Onset    Cancer Sister 39        colon ca       ROS:14 point ROS neg. Objective:   Vital Signs: (As obtained by patient/caregiver at home)  There were no vitals taken for this visit.      [INSTRUCTIONS:  \"[x]\" Indicates a positive item  \"[]\" Indicates a negative item  -- DELETE ALL ITEMS NOT EXAMINED]    Constitutional: [x] Appears well-developed and well-nourished [x] No apparent distress      [] Abnormal -     Mental status: [x] Alert and awake  [x] Oriented to person/place/time [x] Able to follow commands    [] Abnormal -     Eyes:   EOM    [x]  Normal    [] Abnormal -   Sclera  [x]  Normal    [] Abnormal -          Discharge []  None visible   [] Abnormal -     HENT: [x] Normocephalic, atraumatic  [] Abnormal -   [x] Mouth/Throat: Mucous membranes are moist    External Ears [x] Normal  [] Abnormal -    Neck: [x] No visualized mass [] Abnormal -     Pulmonary/Chest: [x] Respiratory effort normal   [x] No visualized signs of difficulty breathing or respiratory distress        [] Abnormal -      Musculoskeletal:   [] Normal gait with no signs of ataxia         [x] Normal range of motion of neck        [] Abnormal -     Neurological:        [x] No Facial Asymmetry (Cranial nerve 7 motor function) (limited exam due to video visit)          [x] No gaze palsy        [] Abnormal -          Skin:        [x] No significant exanthematous lesions or discoloration noted on facial skin         [] Abnormal -            Psychiatric:       [x] Normal Affect [] Abnormal -        [x] No Hallucinations    Other pertinent observable physical exam findings:-        We discussed the expected course, resolution and complications of the diagnosis(es) in detail. Medication risks, benefits, costs, interactions, and alternatives were discussed as indicated. I advised her to contact the office if her condition worsens, changes or fails to improve as anticipated. She expressed understanding with the diagnosis(es) and plan. Mitzi Vazquez is a 72 y.o. female who was evaluated by a video visit encounter for concerns as above. Patient identification was verified prior to start of the visit. A caregiver was present when appropriate. Due to this being a TeleHealth encounter ([facility] emergency), evaluation of the following organ systems was limited: Vitals/Constitutional/EENT/Resp/CV/GI//MS/Neuro/Skin/Heme-Lymph-Imm.   Pursuant to the emergency declaration under the [address] [place] [address] and the [email or web address] and [employer] Act, this Virtual  Visit was conducted, with patient's (and/or legal guardian's) consent, to reduce the patient's risk of exposure to COVID-19 and provide necessary medical care. Services were provided through a video synchronous discussion virtually to substitute for in-person clinic visit. Patient and provider were located at their individual homes. Please note that this dictation was completed with [employer] the computer voice recognition software. Quite often unanticipated grammatical, syntax, homophones, and other interpretive errors are inadvertently transcribed by the computer software. Please disregard these errors. Please excuse any errors that have escaped final proofreading. Thank you.     Kirsten Cabrera MD

## 2020-10-28 DIAGNOSIS — I10 ESSENTIAL HYPERTENSION: ICD-10-CM

## 2020-10-28 RX ORDER — AMLODIPINE BESYLATE 5 MG/1
5 TABLET ORAL DAILY
Qty: 90 TAB | Refills: 3 | Status: SHIPPED | OUTPATIENT
Start: 2020-10-28 | End: 2022-01-12 | Stop reason: SDUPTHER

## 2020-11-11 RX ORDER — BUDESONIDE AND FORMOTEROL FUMARATE DIHYDRATE 160; 4.5 UG/1; UG/1
2 AEROSOL RESPIRATORY (INHALATION) 2 TIMES DAILY
Qty: 1 INHALER | Refills: 11 | Status: SHIPPED | OUTPATIENT
Start: 2020-11-11 | End: 2021-09-08

## 2021-03-10 ENCOUNTER — OFFICE VISIT (OUTPATIENT)
Dept: INTERNAL MEDICINE CLINIC | Age: 71
End: 2021-03-10
Payer: MEDICARE

## 2021-03-10 VITALS
BODY MASS INDEX: 21.02 KG/M2 | RESPIRATION RATE: 16 BRPM | SYSTOLIC BLOOD PRESSURE: 147 MMHG | HEIGHT: 63 IN | TEMPERATURE: 98 F | DIASTOLIC BLOOD PRESSURE: 80 MMHG | OXYGEN SATURATION: 99 % | WEIGHT: 118.6 LBS | HEART RATE: 68 BPM

## 2021-03-10 DIAGNOSIS — Z11.59 NEED FOR HEPATITIS C SCREENING TEST: ICD-10-CM

## 2021-03-10 DIAGNOSIS — E04.9 NODULAR GOITER: ICD-10-CM

## 2021-03-10 DIAGNOSIS — U07.1 COVID-19: ICD-10-CM

## 2021-03-10 DIAGNOSIS — I10 ESSENTIAL HYPERTENSION: Primary | ICD-10-CM

## 2021-03-10 DIAGNOSIS — E78.5 DYSLIPIDEMIA: ICD-10-CM

## 2021-03-10 DIAGNOSIS — Z00.00 WELLNESS EXAMINATION: ICD-10-CM

## 2021-03-10 DIAGNOSIS — D64.9 ANEMIA, UNSPECIFIED TYPE: ICD-10-CM

## 2021-03-10 DIAGNOSIS — Z12.31 SCREENING MAMMOGRAM, ENCOUNTER FOR: ICD-10-CM

## 2021-03-10 DIAGNOSIS — Z13.31 SCREENING FOR DEPRESSION: ICD-10-CM

## 2021-03-10 PROCEDURE — 36415 COLL VENOUS BLD VENIPUNCTURE: CPT | Performed by: INTERNAL MEDICINE

## 2021-03-10 PROCEDURE — G8753 SYS BP > OR = 140: HCPCS | Performed by: INTERNAL MEDICINE

## 2021-03-10 PROCEDURE — 3017F COLORECTAL CA SCREEN DOC REV: CPT | Performed by: INTERNAL MEDICINE

## 2021-03-10 PROCEDURE — G8420 CALC BMI NORM PARAMETERS: HCPCS | Performed by: INTERNAL MEDICINE

## 2021-03-10 PROCEDURE — 99214 OFFICE O/P EST MOD 30 MIN: CPT | Performed by: INTERNAL MEDICINE

## 2021-03-10 PROCEDURE — G8427 DOCREV CUR MEDS BY ELIG CLIN: HCPCS | Performed by: INTERNAL MEDICINE

## 2021-03-10 PROCEDURE — G8754 DIAS BP LESS 90: HCPCS | Performed by: INTERNAL MEDICINE

## 2021-03-10 PROCEDURE — G9899 SCRN MAM PERF RSLTS DOC: HCPCS | Performed by: INTERNAL MEDICINE

## 2021-03-10 PROCEDURE — 1101F PT FALLS ASSESS-DOCD LE1/YR: CPT | Performed by: INTERNAL MEDICINE

## 2021-03-10 PROCEDURE — 1090F PRES/ABSN URINE INCON ASSESS: CPT | Performed by: INTERNAL MEDICINE

## 2021-03-10 PROCEDURE — G8510 SCR DEP NEG, NO PLAN REQD: HCPCS | Performed by: INTERNAL MEDICINE

## 2021-03-10 PROCEDURE — G0439 PPPS, SUBSEQ VISIT: HCPCS | Performed by: INTERNAL MEDICINE

## 2021-03-10 PROCEDURE — G8536 NO DOC ELDER MAL SCRN: HCPCS | Performed by: INTERNAL MEDICINE

## 2021-03-10 PROCEDURE — G8400 PT W/DXA NO RESULTS DOC: HCPCS | Performed by: INTERNAL MEDICINE

## 2021-03-10 NOTE — PROGRESS NOTES
Chief Complaint   Patient presents with   t Annual Wellness Visit       1. Have you been to the ER, urgent care clinic since your last visit? Hospitalized since your last visit? No    2. Have you seen or consulted any other health care providers outside of the [address] [place] [address] since your last visit? Include any pap smears or colon screening.  No

## 2021-03-11 LAB
ALBUMIN SERPL-MCNC: 4.6 G/DL (ref 3.5–5)
ALBUMIN/GLOB SERPL: 1.6 {RATIO} (ref 1.1–2.2)
ALP SERPL-CCNC: 67 U/L (ref 45–117)
ALT SERPL-CCNC: 18 U/L (ref 12–78)
ANION GAP SERPL CALC-SCNC: 5 MMOL/L (ref 5–15)
APPEARANCE UR: CLEAR
AST SERPL-CCNC: 12 U/L (ref 15–37)
BACTERIA URNS QL MICRO: NEGATIVE /HPF
BASOPHILS # BLD: 0.1 K/UL (ref 0–0.1)
BASOPHILS NFR BLD: 1 % (ref 0–1)
BILIRUB SERPL-MCNC: 0.5 MG/DL (ref 0.2–1)
BILIRUB UR QL: NEGATIVE
BUN SERPL-MCNC: 21 MG/DL (ref 6–20)
BUN/CREAT SERPL: 16 (ref 12–20)
CALCIUM SERPL-MCNC: 9.7 MG/DL (ref 8.5–10.1)
CHLORIDE SERPL-SCNC: 108 MMOL/L (ref 97–108)
CHOLEST SERPL-MCNC: 194 MG/DL
CO2 SERPL-SCNC: 28 MMOL/L (ref 21–32)
COLOR UR: ABNORMAL
CREAT SERPL-MCNC: 1.34 MG/DL (ref 0.55–1.02)
DIFFERENTIAL METHOD BLD: ABNORMAL
EOSINOPHIL # BLD: 0.2 K/UL (ref 0–0.4)
EOSINOPHIL NFR BLD: 4 % (ref 0–7)
EPITH CASTS URNS QL MICRO: ABNORMAL /LPF
ERYTHROCYTE [DISTWIDTH] IN BLOOD BY AUTOMATED COUNT: 12.8 % (ref 11.5–14.5)
GLOBULIN SER CALC-MCNC: 2.9 G/DL (ref 2–4)
GLUCOSE SERPL-MCNC: 85 MG/DL (ref 65–100)
GLUCOSE UR STRIP.AUTO-MCNC: NEGATIVE MG/DL
HCT VFR BLD AUTO: 35.2 % (ref 35–47)
HCV AB SERPL QL IA: NONREACTIVE
HCV COMMENT,HCGAC: NORMAL
HDLC SERPL-MCNC: 67 MG/DL
HDLC SERPL: 2.9 {RATIO} (ref 0–5)
HGB BLD-MCNC: 11.2 G/DL (ref 11.5–16)
HGB UR QL STRIP: NEGATIVE
HYALINE CASTS URNS QL MICRO: ABNORMAL /LPF (ref 0–5)
IMM GRANULOCYTES # BLD AUTO: 0 K/UL (ref 0–0.04)
IMM GRANULOCYTES NFR BLD AUTO: 0 % (ref 0–0.5)
KETONES UR QL STRIP.AUTO: NEGATIVE MG/DL
LDLC SERPL CALC-MCNC: 103.2 MG/DL (ref 0–100)
LEUKOCYTE ESTERASE UR QL STRIP.AUTO: ABNORMAL
LIPID PROFILE,FLP: ABNORMAL
LYMPHOCYTES # BLD: 2 K/UL (ref 0.8–3.5)
LYMPHOCYTES NFR BLD: 39 % (ref 12–49)
MCH RBC QN AUTO: 30.8 PG (ref 26–34)
MCHC RBC AUTO-ENTMCNC: 31.8 G/DL (ref 30–36.5)
MCV RBC AUTO: 96.7 FL (ref 80–99)
MONOCYTES # BLD: 0.5 K/UL (ref 0–1)
MONOCYTES NFR BLD: 10 % (ref 5–13)
NEUTS SEG # BLD: 2.3 K/UL (ref 1.8–8)
NEUTS SEG NFR BLD: 46 % (ref 32–75)
NITRITE UR QL STRIP.AUTO: NEGATIVE
NRBC # BLD: 0 K/UL (ref 0–0.01)
NRBC BLD-RTO: 0 PER 100 WBC
PH UR STRIP: 5.5 [PH] (ref 5–8)
PLATELET # BLD AUTO: 249 K/UL (ref 150–400)
PMV BLD AUTO: 10.1 FL (ref 8.9–12.9)
POTASSIUM SERPL-SCNC: 4.3 MMOL/L (ref 3.5–5.1)
PROT SERPL-MCNC: 7.5 G/DL (ref 6.4–8.2)
PROT UR STRIP-MCNC: NEGATIVE MG/DL
RBC # BLD AUTO: 3.64 M/UL (ref 3.8–5.2)
RBC #/AREA URNS HPF: ABNORMAL /HPF (ref 0–5)
SODIUM SERPL-SCNC: 141 MMOL/L (ref 136–145)
SP GR UR REFRACTOMETRY: 1.01 (ref 1–1.03)
TRIGL SERPL-MCNC: 119 MG/DL (ref ?–150)
TSH SERPL DL<=0.05 MIU/L-ACNC: 1.42 UIU/ML (ref 0.36–3.74)
UROBILINOGEN UR QL STRIP.AUTO: 0.2 EU/DL (ref 0.2–1)
VLDLC SERPL CALC-MCNC: 23.8 MG/DL
WBC # BLD AUTO: 5.1 K/UL (ref 3.6–11)
WBC URNS QL MICRO: ABNORMAL /HPF (ref 0–4)

## 2021-03-13 LAB — APO B SERPL-MCNC: 83 MG/DL

## 2021-03-14 NOTE — PROGRESS NOTES
[address] [place] [address] and Primary Care  [facility]  [address] [place] [address]  Phone:  [phone]  Fax: [phone]       Chief Complaint   Patient presents with   [facility] Wellness Visit   . SUBJECTIVE:    Tiffany Elam is a 80 y.o. female comes in for return visit stating that she has generally done well. She continues to work on a part-time basis. She had a COVID infection one year ago and did well although she was hospitalized transiently for three or four days. During that same admission, her   from COVID infection. She has a past history of primary hypertension, anemia, and a nodular goiter previously noted. Current Outpatient Medications   Medication Sig Dispense Refill    budesonide-formoteroL (SYMBICORT) 160-4.5 mcg/actuation HFAA Take 2 Puffs by inhalation two (2) times a day. 1 Inhaler 11    amLODIPine (NORVASC) 5 mg tablet Take 1 Tab by mouth daily. 80 Tab 3     Past Medical History:   Diagnosis Date    Pneumonia 2010     Past Surgical History:   Procedure Laterality Date    COLONOSCOPY N/A 2016    COLONOSCOPY performed by Susan Overton MD at [address] HX BREAST BIOPSY Right     Benign. Patient thinks biopsy was on rt breast lower areolar area. Along time ago.     HX GI      colonscopy - about     HX GYN      hysterectomy    HX HYSTERECTOMY      HX ORTHOPAEDIC      surgery for broken foot - right     No Known Allergies      REVIEW OF SYSTEMS:  General: negative for - chills or fever  ENT: negative for - headaches, nasal congestion or tinnitus  Respiratory: negative for - cough, hemoptysis, shortness of breath or wheezing  Cardiovascular : negative for - chest pain, edema, palpitations or shortness of breath  Gastrointestinal: negative for - abdominal pain, blood in stools, heartburn or nausea/vomiting  Genito-Urinary: no dysuria, trouble voiding, or hematuria  Musculoskeletal: negative for - gait disturbance, joint pain, joint stiffness or joint swelling  Neurological: no TIA or stroke symptoms  Hematologic: no bruises, no bleeding, no swollen glands  Integument: no lumps, mole changes, nail changes or rash  Endocrine: no malaise/lethargy or unexpected weight changes      Social History     Socioeconomic History    Marital status: LEGALLY      Spouse name: Not on file    Number of children: 3    Years of education: Not on file    Highest education level: Not on file   Occupational History    Occupation: retired cna    Occupation: Current  [employer] Use    Smoking status: Never Smoker    Smokeless tobacco: Never Used   Substance and Sexual Activity    Alcohol use: No    Drug use: No    Sexual activity: Not Currently     Family History   Problem Relation Age of Onset    Cancer Sister 39        colon ca       OBJECTIVE:    Visit Vitals  BP (!) 147/80   Pulse 68   Temp 98 °F (36.7 °C) (Oral)   Resp 16   Ht 5' 3\" (1.6 m)   Wt 118 lb 9.6 oz (53.8 kg)   SpO2 99%   BMI 21.01 kg/m²     CONSTITUTIONAL: well , well nourished, appears age appropriate  EYES: perrla, eom intact  ENMT:moist mucous membranes, pharynx clear  NECK: supple. Nodular goiter  RESPIRATORY: Chest: clear to ascultation and percussion   CARDIOVASCULAR: Heart: regular rate and rhythm  GASTROINTESTINAL: Abdomen: soft, bowel sounds active  HEMATOLOGIC: no pathological lymph nodes palpated  MUSCULOSKELETAL: Extremities: no edema, pulse 1+   INTEGUMENT: No unusual rashes or suspicious skin lesions noted. Nails appear normal.  NEUROLOGIC: non-focal exam   MENTAL STATUS: alert and oriented, appropriate affect      ASSESSMENT:  1. Essential hypertension    2. Nodular goiter    3. Anemia, unspecified type    4. COVID-19    5. Screening mammogram, encounter for    6. Need for hepatitis C screening test    7. Dyslipidemia    8. Screening for depression    9. Wellness examination        PLAN:  1.  The patient's blood pressures are excellent today. No adjustments are made. 2. She does have a nodular goiter, which has not really changed. Ultrasound will be done next year. 3. She has chronic anemia and CBC will be checked to assure that there has been no progression. 4. She has had no sequela from her COVID infection. 5. Mammogram was scheduled for the patient. 6. Overall, she is quite healthy and has indeed gained weight from the last visit. .  Orders Placed This Encounter    ANNUAL DEPRESSION SCREEN 8-15 MIN    NATALIE MAMMO LT SCREENING INCL CAD    HEPATITIS C AB    APOLIPOPROTEIN B    CBC WITH AUTOMATED DIFF    LIPID PANEL    METABOLIC PANEL, COMPREHENSIVE    TSH 3RD GENERATION    URINALYSIS W/ RFLX MICROSCOPIC         Follow-up and Dispositions    · Return in about 6 months (around 9/10/2021). Nohemy Escobedo MD  This is the Subsequent Medicare Annual Wellness Exam, performed 12 months or more after the Initial AWV or the last Subsequent AWV    I have reviewed the patient's medical history in detail and updated the computerized patient record. Depression Risk Factor Screening:     3 most recent PHQ Screens 3/10/2021   Little interest or pleasure in doing things Not at all   Feeling down, depressed, irritable, or hopeless Not at all   Total Score PHQ 2 0       Alcohol Risk Screen    Do you average more than 1 drink per night or more than 7 drinks a week:  No    On any one occasion in the past three months have you have had more than 3 drinks containing alcohol:  No        Functional Ability and Level of Safety:    Hearing: Hearing is good. Activities of Daily Living: The home contains: no safety equipment. Patient does total self care      Ambulation: with no difficulty     Fall Risk:  Fall Risk Assessment, last 12 mths 3/10/2021   Able to walk? Yes   Fall in past 12 months? 0   Do you feel unsteady?  0   Are you worried about falling 0      Abuse Screen:  Patient is not abused       Cognitive Screening    Has your family/caregiver stated any concerns about your memory: no     Cognitive Screening: Not applicable    Assessment/Plan   Education and counseling provided:  Are appropriate based on today's review and evaluation    Diagnoses and all orders for this visit:    1. Essential hypertension  -     CBC WITH AUTOMATED DIFF; Future  -     COLLECTION VENOUS BLOOD,VENIPUNCTURE  -     METABOLIC PANEL, COMPREHENSIVE; Future  -     URINALYSIS W/ RFLX MICROSCOPIC; Future    2. Nodular goiter  -     TSH 3RD GENERATION; Future    3. Anemia, unspecified type    4. COVID-19    5. Screening mammogram, encounter for  -     NATALIE MAMMO LT SCREENING INCL CAD; Future    6. Need for hepatitis C screening test  -     HEPATITIS C AB; Future    7. Dyslipidemia  -     APOLIPOPROTEIN B; Future  -     LIPID PANEL; Future    8. Screening for depression  -     DEPRESSION SCREEN ANNUAL    9. Wellness examination        Health Maintenance Due     Health Maintenance Due   Topic Date Due    COVID-19 Vaccine (1) Never done    GLAUCOMA SCREENING Q2Y  Never done    Flu Vaccine (1) 09/01/2020       Patient Care Team   Patient Care Team:  Concepcion Grant MD as PCP - General (Internal Medicine)  Concepcion Grant MD as PCP - [facility] Empaneled Provider    History     Patient Active Problem List   Diagnosis Code    Anemia D64.9    Dyspnea on exertion R06.00    Mild reactive airways disease J45.909    Essential hypertension I10    G6PD deficiency D75. A    Lymphadenopathy of head and neck R59.1    Nodular goiter E04.9    White coat syndrome with high blood pressure but without hypertension R03.0    COVID-19 U07.1    Pneumonitis J18.9    Acute respiratory failure with hypoxia (HCC) J96.01     Past Medical History:   Diagnosis Date    Pneumonia 04/01/2010      Past Surgical History:   Procedure Laterality Date    COLONOSCOPY N/A 5/23/2016    COLONOSCOPY performed by Bandar Thompson MD at [place] [address] Right Benign. Patient thinks biopsy was on rt breast lower areolar area. Along time ago.  HX GI      colonscopy - about 2011    HX GYN      hysterectomy    HX HYSTERECTOMY      HX ORTHOPAEDIC      surgery for broken foot - right     Current Outpatient Medications   Medication Sig Dispense Refill    budesonide-formoteroL (SYMBICORT) 160-4.5 mcg/actuation HFAA Take 2 Puffs by inhalation two (2) times a day. 1 Inhaler 11    amLODIPine (NORVASC) 5 mg tablet Take 1 Tab by mouth daily. 80 Tab 3     No Known Allergies    Family History   Problem Relation Age of Onset    Cancer Sister 39        colon ca     Social History     Tobacco Use    Smoking status: Never Smoker    Smokeless tobacco: Never Used   Substance Use Topics    Alcohol use:  No

## 2021-03-14 NOTE — PATIENT INSTRUCTIONS
Medicare Wellness Visit, Female The best way to live healthy is to have a lifestyle where you eat a well-balanced diet, exercise regularly, limit alcohol use, and quit all forms of tobacco/nicotine, if applicable. Regular preventive services are another way to keep healthy. Preventive services (vaccines, screening tests, monitoring & exams) can help personalize your care plan, which helps you manage your own care. Screening tests can find health problems at the earliest stages, when they are easiest to treat. [facility] follows the current, evidence-based guidelines published by the [facility] Clarence Barajas ([facility]) when recommending preventive services for our patients. Because we follow these guidelines, sometimes recommendations change over time as research supports it. (For example, mammograms used to be recommended annually. Even though Medicare will still pay for an annual mammogram, the newer guidelines recommend a mammogram every two years for women of average risk). Of course, you and your doctor may decide to screen more often for some diseases, based on your risk and your co-morbidities (chronic disease you are already diagnosed with). Preventive services for you include: - Medicare offers their members a free annual wellness visit, which is time for you and your primary care provider to discuss and plan for your preventive service needs. Take advantage of this benefit every year! 
-All adults over the age of 72 should receive the recommended pneumonia vaccines. Current USPSTF guidelines recommend a series of two vaccines for the best pneumonia protection.  
-All adults should have a flu vaccine yearly and a tetanus vaccine every 10 years.  
-All adults age 48 and older should receive the shingles vaccines (series of two vaccines).      
-All adults age 38-68 who are overweight should have a diabetes screening test once every three years.  
-All adults born between [birth year] and 1964 should be screened once for Hepatitis C. 
-Other screening tests and preventive services for persons with diabetes include: an eye exam to screen for diabetic retinopathy, a kidney function test, a foot exam, and stricter control over your cholesterol.  
-Cardiovascular screening for adults with routine risk involves an electrocardiogram (ECG) at intervals determined by your doctor.  
-Colorectal cancer screenings should be done for adults age 54-65 with no increased risk factors for colorectal cancer. There are a number of acceptable methods of screening for this type of cancer. Each test has its own benefits and drawbacks. Discuss with your doctor what is most appropriate for you during your annual wellness visit. The different tests include: colonoscopy (considered the best screening method), a fecal occult blood test, a fecal DNA test, and sigmoidoscopy. 
 
-A bone mass density test is recommended when a woman turns 65 to screen for osteoporosis. This test is only recommended one time, as a screening. Some providers will use this same test as a disease monitoring tool if you already have osteoporosis. -Breast cancer screenings are recommended every other year for women of normal risk, age 54-69. 
-Cervical cancer screenings for women over age 72 are only recommended with certain risk factors. Here is a list of your current Health Maintenance items (your personalized list of preventive services) with a due date: 
Health Maintenance Due Topic Date Due  
 COVID-19 Vaccine (1) Never done  Glaucoma Screening   Never done  Yearly Flu Vaccine (1) 09/01/2020

## 2021-03-30 ENCOUNTER — HOSPITAL ENCOUNTER (OUTPATIENT)
Dept: MAMMOGRAPHY | Age: 71
Discharge: HOME | End: 2021-03-30
Attending: INTERNAL MEDICINE
Payer: MEDICARE

## 2021-03-30 DIAGNOSIS — Z12.31 SCREENING MAMMOGRAM, ENCOUNTER FOR: ICD-10-CM

## 2021-03-30 PROCEDURE — 77067 SCR MAMMO BI INCL CAD: CPT

## 2021-09-08 ENCOUNTER — HOSPITAL ENCOUNTER (OUTPATIENT)
Dept: GENERAL RADIOLOGY | Age: 71
Discharge: HOME | End: 2021-09-08
Payer: MEDICARE

## 2021-09-08 ENCOUNTER — OFFICE VISIT (OUTPATIENT)
Dept: INTERNAL MEDICINE CLINIC | Age: 71
End: 2021-09-08
Payer: MEDICARE

## 2021-09-08 DIAGNOSIS — J43.1 PANLOBULAR EMPHYSEMA (HCC): ICD-10-CM

## 2021-09-08 DIAGNOSIS — I10 ESSENTIAL HYPERTENSION: Primary | ICD-10-CM

## 2021-09-08 DIAGNOSIS — J45.909 MILD REACTIVE AIRWAYS DISEASE, UNSPECIFIED WHETHER PERSISTENT: ICD-10-CM

## 2021-09-08 DIAGNOSIS — D64.9 ANEMIA, UNSPECIFIED TYPE: ICD-10-CM

## 2021-09-08 DIAGNOSIS — I50.814 RIGHT-SIDED CONGESTIVE HEART FAILURE SECONDARY TO LEFT-SIDED CONGESTIVE HEART FAILURE (HCC): ICD-10-CM

## 2021-09-08 PROCEDURE — G8754 DIAS BP LESS 90: HCPCS | Performed by: INTERNAL MEDICINE

## 2021-09-08 PROCEDURE — G8432 DEP SCR NOT DOC, RNG: HCPCS | Performed by: INTERNAL MEDICINE

## 2021-09-08 PROCEDURE — G8400 PT W/DXA NO RESULTS DOC: HCPCS | Performed by: INTERNAL MEDICINE

## 2021-09-08 PROCEDURE — G8427 DOCREV CUR MEDS BY ELIG CLIN: HCPCS | Performed by: INTERNAL MEDICINE

## 2021-09-08 PROCEDURE — G8536 NO DOC ELDER MAL SCRN: HCPCS | Performed by: INTERNAL MEDICINE

## 2021-09-08 PROCEDURE — 1101F PT FALLS ASSESS-DOCD LE1/YR: CPT | Performed by: INTERNAL MEDICINE

## 2021-09-08 PROCEDURE — 71046 X-RAY EXAM CHEST 2 VIEWS: CPT

## 2021-09-08 PROCEDURE — G8420 CALC BMI NORM PARAMETERS: HCPCS | Performed by: INTERNAL MEDICINE

## 2021-09-08 PROCEDURE — G9899 SCRN MAM PERF RSLTS DOC: HCPCS | Performed by: INTERNAL MEDICINE

## 2021-09-08 PROCEDURE — 3017F COLORECTAL CA SCREEN DOC REV: CPT | Performed by: INTERNAL MEDICINE

## 2021-09-08 PROCEDURE — 1090F PRES/ABSN URINE INCON ASSESS: CPT | Performed by: INTERNAL MEDICINE

## 2021-09-08 PROCEDURE — G8753 SYS BP > OR = 140: HCPCS | Performed by: INTERNAL MEDICINE

## 2021-09-08 PROCEDURE — 99214 OFFICE O/P EST MOD 30 MIN: CPT | Performed by: INTERNAL MEDICINE

## 2021-09-08 RX ORDER — FLUTICASONE FUROATE, UMECLIDINIUM BROMIDE AND VILANTEROL TRIFENATATE 200; 62.5; 25 UG/1; UG/1; UG/1
1 POWDER RESPIRATORY (INHALATION) DAILY
Qty: 1 EACH | Refills: 11 | Status: SHIPPED | OUTPATIENT
Start: 2021-09-08 | End: 2021-10-18

## 2021-09-08 NOTE — PROGRESS NOTES
Chief Complaint   Patient presents with    Hypertension     6 month follow up         1. Have you been to the ER, urgent care clinic since your last visit? Hospitalized since your last visit? No    2. Have you seen or consulted any other health care providers outside of the [address] [place] [address] since your last visit? Include any pap smears or colon screening.  No

## 2021-09-09 LAB
ANION GAP SERPL CALC-SCNC: 3 MMOL/L (ref 5–15)
BASOPHILS # BLD: 0.1 K/UL (ref 0–0.1)
BASOPHILS NFR BLD: 1 % (ref 0–1)
BNP SERPL-MCNC: 110 PG/ML
BUN SERPL-MCNC: 17 MG/DL (ref 6–20)
BUN/CREAT SERPL: 15 (ref 12–20)
CALCIUM SERPL-MCNC: 9.5 MG/DL (ref 8.5–10.1)
CHLORIDE SERPL-SCNC: 113 MMOL/L (ref 97–108)
CO2 SERPL-SCNC: 27 MMOL/L (ref 21–32)
CREAT SERPL-MCNC: 1.16 MG/DL (ref 0.55–1.02)
DIFFERENTIAL METHOD BLD: ABNORMAL
EOSINOPHIL # BLD: 0.1 K/UL (ref 0–0.4)
EOSINOPHIL NFR BLD: 2 % (ref 0–7)
ERYTHROCYTE [DISTWIDTH] IN BLOOD BY AUTOMATED COUNT: 12.8 % (ref 11.5–14.5)
GLUCOSE SERPL-MCNC: 87 MG/DL (ref 65–100)
HCT VFR BLD AUTO: 35.6 % (ref 35–47)
HGB BLD-MCNC: 11.5 G/DL (ref 11.5–16)
IMM GRANULOCYTES # BLD AUTO: 0 K/UL (ref 0–0.04)
IMM GRANULOCYTES NFR BLD AUTO: 0 % (ref 0–0.5)
LYMPHOCYTES # BLD: 1.6 K/UL (ref 0.8–3.5)
LYMPHOCYTES NFR BLD: 32 % (ref 12–49)
MCH RBC QN AUTO: 31.4 PG (ref 26–34)
MCHC RBC AUTO-ENTMCNC: 32.3 G/DL (ref 30–36.5)
MCV RBC AUTO: 97.3 FL (ref 80–99)
MONOCYTES # BLD: 0.5 K/UL (ref 0–1)
MONOCYTES NFR BLD: 10 % (ref 5–13)
NEUTS SEG # BLD: 2.7 K/UL (ref 1.8–8)
NEUTS SEG NFR BLD: 55 % (ref 32–75)
NRBC # BLD: 0 K/UL (ref 0–0.01)
NRBC BLD-RTO: 0 PER 100 WBC
PLATELET # BLD AUTO: 217 K/UL (ref 150–400)
PMV BLD AUTO: 10.7 FL (ref 8.9–12.9)
POTASSIUM SERPL-SCNC: 4.4 MMOL/L (ref 3.5–5.1)
RBC # BLD AUTO: 3.66 M/UL (ref 3.8–5.2)
SODIUM SERPL-SCNC: 143 MMOL/L (ref 136–145)
WBC # BLD AUTO: 4.9 K/UL (ref 3.6–11)

## 2021-10-18 VITALS
WEIGHT: 113.1 LBS | SYSTOLIC BLOOD PRESSURE: 144 MMHG | TEMPERATURE: 98.3 F | DIASTOLIC BLOOD PRESSURE: 78 MMHG | HEART RATE: 75 BPM | BODY MASS INDEX: 20.04 KG/M2 | RESPIRATION RATE: 14 BRPM | OXYGEN SATURATION: 93 % | HEIGHT: 63 IN

## 2021-10-18 RX ORDER — FLUTICASONE FUROATE, UMECLIDINIUM BROMIDE AND VILANTEROL TRIFENATATE 100; 62.5; 25 UG/1; UG/1; UG/1
1 POWDER RESPIRATORY (INHALATION) DAILY
Qty: 1 EACH | Refills: 11 | Status: SHIPPED | OUTPATIENT
Start: 2021-10-18

## 2021-10-19 NOTE — PROGRESS NOTES
[address] [facility]  [place] [address]  [address] [place] [address]  Phone:  [phone]  Fax: [phone]       Chief Complaint   Patient presents with    Hypertension     6 month follow up   . SUBJECTIVE:    Aba Wilcox is a 80 y.o. female The patient comes in for return visit continuing go complain of dyspnea on exertion which she is complaining about for the last several years. I have not been able to find an etiology although I initially attributed to reactive airway disease. Chest x-ray was obtained and suggests that the patient has findings compatible with COPD. Interestingly she never smoked cigarettes; however, her  used to smoke and did it for multiple decades. She had a significant length of time of passive smoking. The shortness of breath does not interfere with her functional status however. She has a past history of anemia, as well as primary hypertension. Current Outpatient Medications   Medication Sig Dispense Refill    fluticasone-umeclidinium-vilanterol (Trelegy Ellipta) 100-62.5-25 mcg inhaler Take 1 Puff by inhalation daily. 1 Each 11    amLODIPine (NORVASC) 5 mg tablet Take 1 Tab by mouth daily. 80 Tab 3     Past Medical History:   Diagnosis Date    Pneumonia 04/01/2010     Past Surgical History:   Procedure Laterality Date    COLONOSCOPY N/A 5/23/2016    COLONOSCOPY performed by Abilio Graff MD at [address] HX BREAST BIOPSY Right     Benign. Patient thinks biopsy was on rt breast lower areolar area. Along time ago.     HX GI      colonscopy - about 2011    HX GYN      hysterectomy    HX HYSTERECTOMY      HX ORTHOPAEDIC      surgery for broken foot - right     No Known Allergies      REVIEW OF SYSTEMS:  General: negative for - chills or fever  ENT: negative for - headaches, nasal congestion or tinnitus  Respiratory: negative for - cough, hemoptysis, shortness of breath or wheezing  Cardiovascular : negative for - chest pain, edema, palpitations or shortness of breath  Gastrointestinal: negative for - abdominal pain, blood in stools, heartburn or nausea/vomiting  Genito-Urinary: no dysuria, trouble voiding, or hematuria  Musculoskeletal: negative for - gait disturbance, joint pain, joint stiffness or joint swelling  Neurological: no TIA or stroke symptoms  Hematologic: no bruises, no bleeding, no swollen glands  Integument: no lumps, mole changes, nail changes or rash  Endocrine: no malaise/lethargy or unexpected weight changes      Social History     Socioeconomic History    Marital status: LEGALLY      Spouse name: Not on file    Number of children: 3    Years of education: Not on file    Highest education level: Not on file   Occupational History    Occupation: retired cna    Occupation: Current   Use    Smoking status: Never Smoker    Smokeless tobacco: Never Used   Vaping Use    Vaping Use: Never used   Substance and Sexual Activity    Alcohol use: No    Drug use: No    Sexual activity: Not Currently     Social Determinants of Health     Financial Resource Strain:     Difficulty of Paying Living Expenses:    Food Insecurity:     Worried About Running Out of Food in the Last Year:     Ran Out of Food in the Last Year:    Transportation Needs:     Lack of Transportation (Medical):      Lack of Transportation (Non-Medical):    Physical Activity:     Days of Exercise per Week:     Minutes of Exercise per Session:    Stress:     Feeling of Stress :    Social Connections:     Frequency of Communication with Friends and Family:     Frequency of Social Gatherings with Friends and Family:     Attends Voodoo Services:     Active Member of Clubs or Organizations:     Attends Club or Organization Meetings:     Marital Status:      Family History   Problem Relation Age of Onset    Cancer Sister 39        colon ca       OBJECTIVE:    Visit Vitals  BP (!) 144/78   Pulse 75   Temp 98.3 °F (36.8 °C) (Oral)   Resp 14   Ht 5' 3\" (1.6 m)   Wt 113 lb 1.6 oz (51.3 kg)   SpO2 93%   BMI 20.03 kg/m²     CONSTITUTIONAL: well , well nourished, appears age appropriate  EYES: perrla, eom intact  ENMT:moist mucous membranes, pharynx clear  NECK: supple. Thyroid normal  RESPIRATORY: Chest: clear to ascultation and percussion   CARDIOVASCULAR: Heart: regular rate and rhythm  GASTROINTESTINAL: Abdomen: soft, bowel sounds active  HEMATOLOGIC: no pathological lymph nodes palpated  MUSCULOSKELETAL: Extremities: no edema, pulse 1+   INTEGUMENT: No unusual rashes or suspicious skin lesions noted. Nails appear normal.  NEUROLOGIC: non-focal exam   MENTAL STATUS: alert and oriented, appropriate affect      ASSESSMENT:  1. Essential hypertension    2. Mild reactive airways disease, unspecified whether persistent    3. Panlobular emphysema ([address]    4. Anemia, unspecified type    5. 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 The patient's blood pressure is slightly elevated. If this remains on her return visit I will increase her antihypertensive medication. 2. She does have reactive airway disease, but this is superimposed on what appears to be COPD. Based on this Trelegy would be an ideal option for her to use on a regular basis to hopefully alleviate the discomfort that she is having and to increase her FEV1.  3. She does have a history of anemia and I will repeat the CBC to make sure that this is not progressive. .  Orders Placed This Encounter    XR CHEST PA LAT    METABOLIC PANEL, BASIC    CBC WITH AUTOMATED DIFF    NT-PRO BNP    fluticasone-umeclidinium-vilanterol (Trelegy Ellipta) 100-62.5-25 mcg inhaler         Follow-up and Dispositions    · Return in about 4 weeks (around 10/6/2021).            Minor Galicia MD

## 2021-11-16 ENCOUNTER — OFFICE VISIT (OUTPATIENT)
Dept: INTERNAL MEDICINE CLINIC | Age: 71
End: 2021-11-16
Payer: MEDICARE

## 2021-11-16 VITALS
TEMPERATURE: 98.6 F | RESPIRATION RATE: 16 BRPM | DIASTOLIC BLOOD PRESSURE: 80 MMHG | OXYGEN SATURATION: 92 % | WEIGHT: 116.5 LBS | HEIGHT: 63 IN | HEART RATE: 76 BPM | BODY MASS INDEX: 20.64 KG/M2 | SYSTOLIC BLOOD PRESSURE: 152 MMHG

## 2021-11-16 DIAGNOSIS — J43.1 PANLOBULAR EMPHYSEMA (HCC): Primary | ICD-10-CM

## 2021-11-16 DIAGNOSIS — J45.909 MILD REACTIVE AIRWAYS DISEASE, UNSPECIFIED WHETHER PERSISTENT: ICD-10-CM

## 2021-11-16 DIAGNOSIS — E04.9 NODULAR GOITER: ICD-10-CM

## 2021-11-16 DIAGNOSIS — D75.A G6PD DEFICIENCY: ICD-10-CM

## 2021-11-16 PROCEDURE — G8427 DOCREV CUR MEDS BY ELIG CLIN: HCPCS | Performed by: INTERNAL MEDICINE

## 2021-11-16 PROCEDURE — G8420 CALC BMI NORM PARAMETERS: HCPCS | Performed by: INTERNAL MEDICINE

## 2021-11-16 PROCEDURE — 1090F PRES/ABSN URINE INCON ASSESS: CPT | Performed by: INTERNAL MEDICINE

## 2021-11-16 PROCEDURE — G9899 SCRN MAM PERF RSLTS DOC: HCPCS | Performed by: INTERNAL MEDICINE

## 2021-11-16 PROCEDURE — G8432 DEP SCR NOT DOC, RNG: HCPCS | Performed by: INTERNAL MEDICINE

## 2021-11-16 PROCEDURE — G8536 NO DOC ELDER MAL SCRN: HCPCS | Performed by: INTERNAL MEDICINE

## 2021-11-16 PROCEDURE — G8753 SYS BP > OR = 140: HCPCS | Performed by: INTERNAL MEDICINE

## 2021-11-16 PROCEDURE — G8754 DIAS BP LESS 90: HCPCS | Performed by: INTERNAL MEDICINE

## 2021-11-16 PROCEDURE — G8400 PT W/DXA NO RESULTS DOC: HCPCS | Performed by: INTERNAL MEDICINE

## 2021-11-16 PROCEDURE — 3017F COLORECTAL CA SCREEN DOC REV: CPT | Performed by: INTERNAL MEDICINE

## 2021-11-16 PROCEDURE — 99214 OFFICE O/P EST MOD 30 MIN: CPT | Performed by: INTERNAL MEDICINE

## 2021-11-16 PROCEDURE — 1101F PT FALLS ASSESS-DOCD LE1/YR: CPT | Performed by: INTERNAL MEDICINE

## 2021-11-16 NOTE — PROGRESS NOTES
Chief Complaint   Patient presents with    Hypertension     routine follow up          1. Have you been to the ER, urgent care clinic since your last visit? Hospitalized since your last visit? No    2. Have you seen or consulted any other health care providers outside of the [address] [place] [address] since your last visit? Include any pap smears or colon screening.  No

## 2021-11-17 NOTE — PROGRESS NOTES
[facility]  [facility]  [address] [place] [address]  Phone:  [phone]  Fax: [phone]       Chief Complaint   Patient presents with    Hypertension     routine follow up    . SUBJECTIVE:    Tiana Gaona is a 80 y.o. female comes in for a return visit stating that she has done reasonably well. She has been using inhaler that I suggested, Thor Actis, but has not noted any change in her dyspnea on exertion although this is not disabling complaint currently. She also technically has a significantly increased cardiovascular risk. This is because of the factored passive cigarette smoking that she was exposed to for many years. Based on this particularly given her age, she needs probably to be on a statin. Her weight has been quite stable. She does have a history of G6PD deficiency related to chronic nonprogressive anemia. Current Outpatient Medications   Medication Sig Dispense Refill    fluticasone-umeclidinium-vilanterol (Trelegy Ellipta) 100-62.5-25 mcg inhaler Take 1 Puff by inhalation daily. 1 Each 11    amLODIPine (NORVASC) 5 mg tablet Take 1 Tab by mouth daily. 80 Tab 3     Past Medical History:   Diagnosis Date    Pneumonia 04/01/2010     Past Surgical History:   Procedure Laterality Date    COLONOSCOPY N/A 5/23/2016    COLONOSCOPY performed by Andreas Lorenzo MD at [address] HX BREAST BIOPSY Right     Benign. Patient thinks biopsy was on rt breast lower areolar area. Along time ago.     HX GI      colonscopy - about 2011    HX GYN      hysterectomy    HX HYSTERECTOMY      HX ORTHOPAEDIC      surgery for broken foot - right     No Known Allergies      REVIEW OF SYSTEMS:  General: negative for - chills or fever  ENT: negative for - headaches, nasal congestion or tinnitus  Respiratory: negative for - cough, hemoptysis, shortness of breath or wheezing  Cardiovascular : negative for - chest pain, edema, palpitations or shortness of breath  Gastrointestinal: negative for - abdominal pain, blood in stools, heartburn or nausea/vomiting  Genito-Urinary: no dysuria, trouble voiding, or hematuria  Musculoskeletal: negative for - gait disturbance, joint pain, joint stiffness or joint swelling  Neurological: no TIA or stroke symptoms  Hematologic: no bruises, no bleeding, no swollen glands  Integument: no lumps, mole changes, nail changes or rash  Endocrine: no malaise/lethargy or unexpected weight changes      Social History     Socioeconomic History    Marital status: LEGALLY     Number of children: 3   Occupational History    Occupation: retired cna    Occupation: Current  [employer] Use    Smoking status: Never Smoker    Smokeless tobacco: Never Used   Vaping Use    Vaping Use: Never used   Substance and Sexual Activity    Alcohol use: No    Drug use: No    Sexual activity: Not Currently     Family History   Problem Relation Age of Onset    Cancer Sister 39        colon ca       OBJECTIVE:    Visit Vitals  BP (!) 152/80   Pulse 76   Temp 98.6 °F (37 °C) (Oral)   Resp 16   Ht 5' 3\" (1.6 m)   Wt 116 lb 8 oz (52.8 kg)   SpO2 92%   BMI 20.64 kg/m²     CONSTITUTIONAL: well , well nourished, appears age appropriate  EYES: perrla, eom intact  ENMT:moist mucous membranes, pharynx clear  NECK: supple. Thyroid normal  RESPIRATORY: Chest: clear to ascultation and percussion   CARDIOVASCULAR: Heart: regular rate and rhythm  GASTROINTESTINAL: Abdomen: soft, bowel sounds active  HEMATOLOGIC: no pathological lymph nodes palpated  MUSCULOSKELETAL: Extremities: no edema, pulse 1+   INTEGUMENT: No unusual rashes or suspicious skin lesions noted. Nails appear normal.  NEUROLOGIC: non-focal exam   MENTAL STATUS: alert and oriented, appropriate affect      ASSESSMENT:  1. Panlobular emphysema ([address]    2. Mild reactive airways disease, unspecified whether persistent    3. Nodular goiter    4.  G6PD deficiency PLAN:  1. The patient has significant COPD, and I think this is the source of her dyspnea. She is not hypoxic. This is occasionally complicated by reactive airway disease to the point I placed her on Annette Labor on her last visit one puff daily. Interestingly, she has been using this, but has not noted any change in her occasional dyspnea on exertion. I suggest she continue this, however. 2. She has a nodular goiter previously noted, and she is indeed euthyroid chemically. 3. She has a chronic anemia related to G6PD deficiency. 5. The patient does have an increased cardiovascular risk. This is created primarily by her age but more importantly by her years of contact with her  who smokes cigarettes excessively to the point which led to developing to COPD. I will reassess this on return visit. Follow-up and Dispositions    · Return in about 3 months (around 2/16/2022).            Ester Barros MD

## 2022-01-12 DIAGNOSIS — I10 ESSENTIAL HYPERTENSION: ICD-10-CM

## 2022-01-13 RX ORDER — AMLODIPINE BESYLATE 5 MG/1
5 TABLET ORAL DAILY
Qty: 90 TABLET | Refills: 3 | Status: SHIPPED | OUTPATIENT
Start: 2022-01-13 | End: 2022-05-17 | Stop reason: SDUPTHER

## 2022-02-16 ENCOUNTER — OFFICE VISIT (OUTPATIENT)
Dept: INTERNAL MEDICINE CLINIC | Age: 72
End: 2022-02-16
Payer: MEDICARE

## 2022-02-16 VITALS
HEIGHT: 63 IN | TEMPERATURE: 98.4 F | SYSTOLIC BLOOD PRESSURE: 159 MMHG | RESPIRATION RATE: 14 BRPM | HEART RATE: 75 BPM | OXYGEN SATURATION: 93 % | DIASTOLIC BLOOD PRESSURE: 96 MMHG | BODY MASS INDEX: 20.68 KG/M2 | WEIGHT: 116.7 LBS

## 2022-02-16 DIAGNOSIS — E04.9 NODULAR GOITER: ICD-10-CM

## 2022-02-16 DIAGNOSIS — J43.1 PANLOBULAR EMPHYSEMA (HCC): ICD-10-CM

## 2022-02-16 DIAGNOSIS — D75.A G6PD DEFICIENCY: ICD-10-CM

## 2022-02-16 DIAGNOSIS — I10 ESSENTIAL HYPERTENSION: Primary | ICD-10-CM

## 2022-02-16 DIAGNOSIS — E78.5 DYSLIPIDEMIA: ICD-10-CM

## 2022-02-16 PROCEDURE — 99214 OFFICE O/P EST MOD 30 MIN: CPT | Performed by: INTERNAL MEDICINE

## 2022-02-16 PROCEDURE — G8536 NO DOC ELDER MAL SCRN: HCPCS | Performed by: INTERNAL MEDICINE

## 2022-02-16 PROCEDURE — G8420 CALC BMI NORM PARAMETERS: HCPCS | Performed by: INTERNAL MEDICINE

## 2022-02-16 PROCEDURE — G8400 PT W/DXA NO RESULTS DOC: HCPCS | Performed by: INTERNAL MEDICINE

## 2022-02-16 PROCEDURE — G8755 DIAS BP > OR = 90: HCPCS | Performed by: INTERNAL MEDICINE

## 2022-02-16 PROCEDURE — G8427 DOCREV CUR MEDS BY ELIG CLIN: HCPCS | Performed by: INTERNAL MEDICINE

## 2022-02-16 PROCEDURE — 3017F COLORECTAL CA SCREEN DOC REV: CPT | Performed by: INTERNAL MEDICINE

## 2022-02-16 PROCEDURE — G9899 SCRN MAM PERF RSLTS DOC: HCPCS | Performed by: INTERNAL MEDICINE

## 2022-02-16 PROCEDURE — G8753 SYS BP > OR = 140: HCPCS | Performed by: INTERNAL MEDICINE

## 2022-02-16 PROCEDURE — 1090F PRES/ABSN URINE INCON ASSESS: CPT | Performed by: INTERNAL MEDICINE

## 2022-02-16 PROCEDURE — G8510 SCR DEP NEG, NO PLAN REQD: HCPCS | Performed by: INTERNAL MEDICINE

## 2022-02-16 PROCEDURE — 1101F PT FALLS ASSESS-DOCD LE1/YR: CPT | Performed by: INTERNAL MEDICINE

## 2022-02-16 NOTE — PROGRESS NOTES
[address] [place] [address] and Primary Care  [place] [address]  [address] [facility] [address]  Phone:  [phone]  Fax: [phone]       Chief Complaint   Patient presents with    Hypertension     3 month follow up    . SUBJECTIVE:    Dinora Bazzi is a 71 y.o. female comes in for return visit stating that she has done well. I reminded her that she does have existing COPD from passive smoking. She is gradually getting worse. The worsening is related primarily to the pulmonary senescence. She is quite active, working parttime. She has a past history of primary hypertension, anemia secondary to G6PD deficiency and a nodular goiter. Current Outpatient Medications   Medication Sig Dispense Refill    amLODIPine (NORVASC) 5 mg tablet Take 1 Tablet by mouth daily. 90 Tablet 3    fluticasone-umeclidinium-vilanterol (Trelegy Ellipta) 100-62.5-25 mcg inhaler Take 1 Puff by inhalation daily. 1 Each 11     History reviewed. No pertinent past medical history. Past Surgical History:   Procedure Laterality Date    COLONOSCOPY N/A 5/23/2016    COLONOSCOPY performed by Audie Combs MD at [address] HX BREAST BIOPSY Right     Benign. Patient thinks biopsy was on rt breast lower areolar area. Along time ago.     HX GI      colonscopy - about 2011    HX GYN      hysterectomy    HX HYSTERECTOMY      HX ORTHOPAEDIC      surgery for broken foot - right     No Known Allergies      REVIEW OF SYSTEMS:  General: negative for - chills or fever  ENT: negative for - headaches, nasal congestion or tinnitus  Respiratory: negative for - cough, hemoptysis, shortness of breath or wheezing  Cardiovascular : negative for - chest pain, edema, palpitations or shortness of breath  Gastrointestinal: negative for - abdominal pain, blood in stools, heartburn or nausea/vomiting  Genito-Urinary: no dysuria, trouble voiding, or hematuria  Musculoskeletal: negative for - gait disturbance, joint pain, joint stiffness or joint swelling  Neurological: no TIA or stroke symptoms  Hematologic: no bruises, no bleeding, no swollen glands  Integument: no lumps, mole changes, nail changes or rash  Endocrine: no malaise/lethargy or unexpected weight changes      Social History     Socioeconomic History    Marital status: LEGALLY     Number of children: 3   Occupational History    Occupation: retired cna    Occupation: [employer] Use    Smoking status: Never Smoker    Smokeless tobacco: Never Used   Vaping Use    Vaping Use: Never used   Substance and Sexual Activity    Alcohol use: No    Drug use: No    Sexual activity: Not Currently     Family History   Problem Relation Age of Onset    Cancer Sister 39        colon ca       OBJECTIVE:    Visit Vitals  BP (!) 159/96   Pulse 75   Temp 98.4 °F (36.9 °C) (Oral)   Resp 14   Ht 5' 3\" (1.6 m)   Wt 116 lb 11.2 oz (52.9 kg)   SpO2 93%   BMI 20.67 kg/m²     CONSTITUTIONAL: well , well nourished, appears age appropriate  EYES: perrla, eom intact  ENMT:moist mucous membranes, pharynx clear  NECK: supple. Thyroid normal  RESPIRATORY: Chest: clear to ascultation and percussion   CARDIOVASCULAR: Heart: regular rate and rhythm  GASTROINTESTINAL: Abdomen: soft, bowel sounds active  HEMATOLOGIC: no pathological lymph nodes palpated  MUSCULOSKELETAL: Extremities: no edema, pulse 1+   INTEGUMENT: No unusual rashes or suspicious skin lesions noted. Nails appear normal.  NEUROLOGIC: non-focal exam   MENTAL STATUS: alert and oriented, appropriate affect      ASSESSMENT:  1. Essential hypertension    2. Nodular goiter    3. Panlobular emphysema ([address]    4. G6PD deficiency    5. Dyslipidemia        PLAN:  1. The patient's blood pressure is excellent today, no adjustments are made. 2. She does have a nodular goiter, but nothing need be done at this point. She does need an ultrasound, but this is not until next year.     3. She has existing COPD related to her passive smoking created by her 's presence in the home. She is reasonably stable although she does have chronic dyspnea on exertion. 4. She is chronically anemic related to G6PD deficiency. This is needless to say stable. Follow-up and Dispositions    · Return in about 4 months (around 6/16/2022).            Flakito Limon MD

## 2022-02-16 NOTE — PROGRESS NOTES
Chief Complaint   Patient presents with    Hypertension     3 month follow up          1. Have you been to the ER, urgent care clinic since your last visit? Hospitalized since your last visit? No    2. Have you seen or consulted any other health care providers outside of the [address] [place] [address] since your last visit? Include any pap smears or colon screening.  No

## 2022-02-17 LAB — CRP SERPL HS-MCNC: 1.7 MG/L

## 2022-02-18 LAB — APO B SERPL-MCNC: 90 MG/DL

## 2022-03-18 PROBLEM — J45.909 MILD REACTIVE AIRWAYS DISEASE: Status: ACTIVE | Noted: 2018-01-11

## 2022-03-19 PROBLEM — R03.0 WHITE COAT SYNDROME WITH HIGH BLOOD PRESSURE BUT WITHOUT HYPERTENSION: Status: ACTIVE | Noted: 2020-01-29

## 2022-03-19 PROBLEM — I10 ESSENTIAL HYPERTENSION: Status: ACTIVE | Noted: 2019-02-28

## 2022-03-19 PROBLEM — R59.1 LYMPHADENOPATHY OF HEAD AND NECK: Status: ACTIVE | Noted: 2019-09-26

## 2022-03-19 PROBLEM — E04.9 NODULAR GOITER: Status: ACTIVE | Noted: 2019-09-26

## 2022-03-19 PROBLEM — R06.09 DYSPNEA ON EXERTION: Status: ACTIVE | Noted: 2017-09-04

## 2022-03-19 PROBLEM — J43.1 PANLOBULAR EMPHYSEMA (HCC): Status: ACTIVE | Noted: 2022-02-16

## 2022-03-19 PROBLEM — U07.1 COVID-19: Status: ACTIVE | Noted: 2020-04-08

## 2022-03-20 PROBLEM — D75.A G6PD DEFICIENCY: Status: ACTIVE | Noted: 2019-09-02

## 2022-03-30 ENCOUNTER — TRANSCRIBE ORDER (OUTPATIENT)
Dept: SCHEDULING | Age: 72
End: 2022-03-30

## 2022-03-30 DIAGNOSIS — Z12.31 SCREENING MAMMOGRAM FOR HIGH-RISK PATIENT: Primary | ICD-10-CM

## 2022-04-08 ENCOUNTER — HOSPITAL ENCOUNTER (OUTPATIENT)
Dept: MAMMOGRAPHY | Age: 72
Discharge: HOME | End: 2022-04-08
Attending: INTERNAL MEDICINE
Payer: MEDICARE

## 2022-04-08 DIAGNOSIS — Z12.31 SCREENING MAMMOGRAM FOR HIGH-RISK PATIENT: ICD-10-CM

## 2022-04-08 PROCEDURE — 77067 SCR MAMMO BI INCL CAD: CPT

## 2022-05-17 DIAGNOSIS — I10 ESSENTIAL HYPERTENSION: ICD-10-CM

## 2022-05-17 RX ORDER — AMLODIPINE BESYLATE 10 MG/1
10 TABLET ORAL DAILY
Qty: 90 TABLET | Refills: 3 | Status: SHIPPED | OUTPATIENT
Start: 2022-05-17

## 2022-06-23 ENCOUNTER — OFFICE VISIT (OUTPATIENT)
Dept: INTERNAL MEDICINE CLINIC | Age: 72
End: 2022-06-23
Payer: MEDICARE

## 2022-06-23 VITALS
OXYGEN SATURATION: 89 % | WEIGHT: 110.6 LBS | HEIGHT: 63 IN | RESPIRATION RATE: 14 BRPM | TEMPERATURE: 98.2 F | HEART RATE: 75 BPM | SYSTOLIC BLOOD PRESSURE: 151 MMHG | BODY MASS INDEX: 19.6 KG/M2 | DIASTOLIC BLOOD PRESSURE: 83 MMHG

## 2022-06-23 DIAGNOSIS — J43.1 PANLOBULAR EMPHYSEMA (HCC): Primary | ICD-10-CM

## 2022-06-23 DIAGNOSIS — D75.A G6PD DEFICIENCY: ICD-10-CM

## 2022-06-23 DIAGNOSIS — Z13.31 SCREENING FOR DEPRESSION: ICD-10-CM

## 2022-06-23 DIAGNOSIS — I10 ESSENTIAL HYPERTENSION: ICD-10-CM

## 2022-06-23 DIAGNOSIS — Z00.00 WELLNESS EXAMINATION: ICD-10-CM

## 2022-06-23 DIAGNOSIS — J96.11 CHRONIC RESPIRATORY FAILURE WITH HYPOXIA (HCC): ICD-10-CM

## 2022-06-23 PROBLEM — N18.30 CHRONIC RENAL DISEASE, STAGE III (HCC): Status: ACTIVE | Noted: 2022-06-23

## 2022-06-23 PROBLEM — I50.814 RIGHT-SIDED CONGESTIVE HEART FAILURE SECONDARY TO LEFT-SIDED CONGESTIVE HEART FAILURE (HCC): Status: ACTIVE | Noted: 2022-06-23

## 2022-06-23 PROCEDURE — G8753 SYS BP > OR = 140: HCPCS | Performed by: INTERNAL MEDICINE

## 2022-06-23 PROCEDURE — G8510 SCR DEP NEG, NO PLAN REQD: HCPCS | Performed by: INTERNAL MEDICINE

## 2022-06-23 PROCEDURE — G8754 DIAS BP LESS 90: HCPCS | Performed by: INTERNAL MEDICINE

## 2022-06-23 PROCEDURE — G8400 PT W/DXA NO RESULTS DOC: HCPCS | Performed by: INTERNAL MEDICINE

## 2022-06-23 PROCEDURE — 3017F COLORECTAL CA SCREEN DOC REV: CPT | Performed by: INTERNAL MEDICINE

## 2022-06-23 PROCEDURE — G8427 DOCREV CUR MEDS BY ELIG CLIN: HCPCS | Performed by: INTERNAL MEDICINE

## 2022-06-23 PROCEDURE — G8536 NO DOC ELDER MAL SCRN: HCPCS | Performed by: INTERNAL MEDICINE

## 2022-06-23 PROCEDURE — 1123F ACP DISCUSS/DSCN MKR DOCD: CPT | Performed by: INTERNAL MEDICINE

## 2022-06-23 PROCEDURE — G9899 SCRN MAM PERF RSLTS DOC: HCPCS | Performed by: INTERNAL MEDICINE

## 2022-06-23 PROCEDURE — 99214 OFFICE O/P EST MOD 30 MIN: CPT | Performed by: INTERNAL MEDICINE

## 2022-06-23 PROCEDURE — 1090F PRES/ABSN URINE INCON ASSESS: CPT | Performed by: INTERNAL MEDICINE

## 2022-06-23 PROCEDURE — G8420 CALC BMI NORM PARAMETERS: HCPCS | Performed by: INTERNAL MEDICINE

## 2022-06-23 PROCEDURE — G0439 PPPS, SUBSEQ VISIT: HCPCS | Performed by: INTERNAL MEDICINE

## 2022-06-23 PROCEDURE — 1101F PT FALLS ASSESS-DOCD LE1/YR: CPT | Performed by: INTERNAL MEDICINE

## 2022-06-23 NOTE — PROGRESS NOTES
Chief Complaint   Patient presents with   t Annual Wellness Visit         1. Have you been to the ER, urgent care clinic since your last visit? Hospitalized since your last visit? No    2. Have you seen or consulted any other health care providers outside of the [address] [place] [address] since your last visit? Include any pap smears or colon screening.  No

## 2022-06-25 NOTE — PROGRESS NOTES
[address] [facility]  [facility]  [address] [place] [address]  Phone:  [phone]  Fax: [phone]       Chief Complaint   Patient presents with   [facility] Wellness Visit   . SUBJECTIVE:    Aleshia Munoz is a 71 y.o. female comes in for return visit stating that she has done reasonably well. Her saturation when she came in was 89%. I asked her about shortness of breath and she admits that she does get short of breath, but often times she does not walk distances when she knows she cannot complete the task because of dyspnea. Her lung disease is felt to be COPD related to her long history of passive smoking by her . She never smoked cigarettes or worked in a polluted environment such as a factory. Obvious pulmonary senescence has occurred in that she is now developing progressive respiratory failure presumably. She has a past history of primary hypertension as well as mild anemia related to G6PD deficiency. Current Outpatient Medications   Medication Sig Dispense Refill    amLODIPine (NORVASC) 10 mg tablet Take 1 Tablet by mouth daily. 90 Tablet 3    fluticasone-umeclidinium-vilanterol (Trelegy Ellipta) 100-62.5-25 mcg inhaler Take 1 Puff by inhalation daily. 1 Each 11     History reviewed. No pertinent past medical history. Past Surgical History:   Procedure Laterality Date    COLONOSCOPY N/A 5/23/2016    COLONOSCOPY performed by Karen Cleveland MD at [address] HX BREAST BIOPSY Right     Benign. Patient thinks biopsy was on rt breast lower areolar area. Along time ago.     HX GI      colonscopy - about 2011    HX GYN      hysterectomy    HX HYSTERECTOMY      HX ORTHOPAEDIC      surgery for broken foot - right     No Known Allergies      REVIEW OF SYSTEMS:  General: negative for - chills or fever  ENT: negative for - headaches, nasal congestion or tinnitus  Respiratory: negative for - cough, hemoptysis, shortness of breath or wheezing  Cardiovascular : negative for - chest pain, edema, palpitations or shortness of breath  Gastrointestinal: negative for - abdominal pain, blood in stools, heartburn or nausea/vomiting  Genito-Urinary: no dysuria, trouble voiding, or hematuria  Musculoskeletal: negative for - gait disturbance, joint pain, joint stiffness or joint swelling  Neurological: no TIA or stroke symptoms  Hematologic: no bruises, no bleeding, no swollen glands  Integument: no lumps, mole changes, nail changes or rash  Endocrine: no malaise/lethargy or unexpected weight changes      Social History     Socioeconomic History    Marital status: SINGLE    Number of children: 3   Occupational History    Occupation: retired cna    Occupation: Current  [employer] Use    Smoking status: Never Smoker    Smokeless tobacco: Never Used   Vaping Use    Vaping Use: Never used   Substance and Sexual Activity    Alcohol use: No    Drug use: No    Sexual activity: Not Currently     Family History   Problem Relation Age of Onset    Cancer Sister 39        colon ca       OBJECTIVE:    Visit Vitals  BP (!) 151/83   Pulse 75   Temp 98.2 °F (36.8 °C) (Oral)   Resp 14   Ht 5' 3\" (1.6 m)   Wt 110 lb 9.6 oz (50.2 kg)   SpO2 (!) 89%   BMI 19.59 kg/m²     CONSTITUTIONAL: well , well nourished, appears age appropriate  EYES: perrla, eom intact  ENMT:moist mucous membranes, pharynx clear  NECK: supple. Thyroid normal  RESPIRATORY: Chest: clear to ascultation and percussion, decreased breath sounds bilaterally  CARDIOVASCULAR: Heart: regular rate and rhythm  GASTROINTESTINAL: Abdomen: soft, bowel sounds active  HEMATOLOGIC: no pathological lymph nodes palpated  MUSCULOSKELETAL: Extremities: no edema, pulse 1+   INTEGUMENT: No unusual rashes or suspicious skin lesions noted. Nails appear normal.  NEUROLOGIC: non-focal exam   MENTAL STATUS: alert and oriented, appropriate affect      ASSESSMENT:  1.  Panlobular emphysema ([facility] [address]    2. Chronic respiratory failure with hypoxia (HCC)    3. Essential hypertension    4. G6PD deficiency    5. Screening for depression        PLAN:  1. The patient's saturations remain in the high 80s. She had actually a two-minute walk and she desaturated to 87%. She was on room air at rest when the values were assessed. She also needs to obviously have a nocturnal assessment to see if she desaturates during this time. In any event, she obviously needs oxygen. 2. Her blood pressure is reasonable today. 3. Her anemia has historically been quite stable. Follow-up and Dispositions    · Return in about 4 weeks (around 7/21/2022). Simón Thacker MD  This is the Subsequent Medicare Annual Wellness Exam, performed 12 months or more after the Initial AWV or the last Subsequent AWV    I have reviewed the patient's medical history in detail and updated the computerized patient record. Assessment/Plan   Education and counseling provided:  Are appropriate based on today's review and evaluation    1. Panlobular emphysema ([facility] [address]  -     RT--OXIMETRY 6 MINUTE WALK; Future  2. Chronic respiratory failure with hypoxia (HCC)  -     RT--OXIMETRY 6 MINUTE WALK; Future  3. Essential hypertension  4. G6PD deficiency  5.  Screening for depression  -     DEPRESSION SCREEN ANNUAL       Depression Risk Factor Screening     3 most recent PHQ Screens 6/23/2022   Little interest or pleasure in doing things Not at all   Feeling down, depressed, irritable, or hopeless Not at all   Total Score PHQ 2 0   Trouble falling or staying asleep, or sleeping too much Not at all   Feeling tired or having little energy Not at all   Poor appetite, weight loss, or overeating Not at all   Feeling bad about yourself - or that you are a failure or have let yourself or your family down Not at all   Trouble concentrating on things such as school, work, reading, or watching TV Not at all   Moving or speaking so slowly that other people could have noticed; or the opposite being so fidgety that others notice Not at all   Thoughts of being better off dead, or hurting yourself in some way Not at all   PHQ 9 Score 0       Alcohol & Drug Abuse Risk Screen    Do you average more than 1 drink per night or more than 7 drinks a week:  No    On any one occasion in the past three months have you have had more than 3 drinks containing alcohol:  No          Functional Ability and Level of Safety    Hearing: Hearing is good. Activities of Daily Living: The home contains: no safety equipment. Patient does total self care      Ambulation: with no difficulty     Fall Risk:  Fall Risk Assessment, last 12 mths 6/23/2022   Able to walk? Yes   Fall in past 12 months? 0   Do you feel unsteady? 0   Are you worried about falling 0      Abuse Screen:  Patient is not abused       Cognitive Screening    Has your family/caregiver stated any concerns about your memory: no     Cognitive Screening: Not applicable    Health Maintenance Due     Health Maintenance Due   Topic Date Due    COVID-19 Vaccine (1) Never done    Pneumococcal 65+ years (1 - PCV) Never done    Shingrix Vaccine Age 50> (1 of 2) Never done       Patient Care Team   Patient Care Team:  Rosmery Tran MD as PCP - General (Internal Medicine Physician)  Rosmery Tran MD as PCP - [facility] Empaneled Provider    History     Patient Active Problem List   Diagnosis Code    Anemia D64.9    Dyspnea on exertion R06.00    Mild reactive airways disease J45.909    Essential hypertension I10    G6PD deficiency D75. A    Lymphadenopathy of head and neck R59.1    Nodular goiter E04.9    White coat syndrome with high blood pressure but without hypertension R03.0    COVID-19 U07.1    Panlobular emphysema (HCC) J43.1    Right-sided congestive heart failure secondary to left-sided congestive heart failure (HCC) I50.814    Chronic renal disease, stage III N18.30     History reviewed.  No pertinent past medical history. Past Surgical History:   Procedure Laterality Date    COLONOSCOPY N/A 5/23/2016    COLONOSCOPY performed by Susan Mccall MD at [address] HX BREAST BIOPSY Right     Benign. Patient thinks biopsy was on rt breast lower areolar area. Along time ago.  HX GI      colonscopy - about 2011    HX GYN      hysterectomy    HX HYSTERECTOMY      HX ORTHOPAEDIC      surgery for broken foot - right     Current Outpatient Medications   Medication Sig Dispense Refill    amLODIPine (NORVASC) 10 mg tablet Take 1 Tablet by mouth daily. 90 Tablet 3    fluticasone-umeclidinium-vilanterol (Trelegy Ellipta) 100-62.5-25 mcg inhaler Take 1 Puff by inhalation daily.  1 Each 11     No Known Allergies    Family History   Problem Relation Age of Onset    Cancer Sister 39        colon ca     Social History     Tobacco Use    Smoking status: Never Smoker    Smokeless tobacco: Never Used   Substance Use Topics    Alcohol use: No         Linh Prasad MD

## 2022-06-25 NOTE — PATIENT INSTRUCTIONS

## 2022-08-16 ENCOUNTER — OFFICE VISIT (OUTPATIENT)
Dept: INTERNAL MEDICINE CLINIC | Age: 72
End: 2022-08-16
Payer: MEDICARE

## 2022-08-16 VITALS
WEIGHT: 112 LBS | DIASTOLIC BLOOD PRESSURE: 68 MMHG | TEMPERATURE: 98 F | SYSTOLIC BLOOD PRESSURE: 138 MMHG | HEIGHT: 63 IN | HEART RATE: 86 BPM | BODY MASS INDEX: 19.84 KG/M2 | RESPIRATION RATE: 19 BRPM | OXYGEN SATURATION: 98 %

## 2022-08-16 DIAGNOSIS — R79.89 PRERENAL AZOTEMIA: ICD-10-CM

## 2022-08-16 DIAGNOSIS — I10 ESSENTIAL HYPERTENSION: ICD-10-CM

## 2022-08-16 DIAGNOSIS — J43.1 PANLOBULAR EMPHYSEMA (HCC): Primary | ICD-10-CM

## 2022-08-16 DIAGNOSIS — D59.9 ACQUIRED HEMOLYTIC ANEMIA (HCC): ICD-10-CM

## 2022-08-16 DIAGNOSIS — D75.A G6PD DEFICIENCY: ICD-10-CM

## 2022-08-16 PROCEDURE — G8752 SYS BP LESS 140: HCPCS | Performed by: INTERNAL MEDICINE

## 2022-08-16 PROCEDURE — 99214 OFFICE O/P EST MOD 30 MIN: CPT | Performed by: INTERNAL MEDICINE

## 2022-08-16 PROCEDURE — G8536 NO DOC ELDER MAL SCRN: HCPCS | Performed by: INTERNAL MEDICINE

## 2022-08-16 PROCEDURE — 1090F PRES/ABSN URINE INCON ASSESS: CPT | Performed by: INTERNAL MEDICINE

## 2022-08-16 PROCEDURE — G8510 SCR DEP NEG, NO PLAN REQD: HCPCS | Performed by: INTERNAL MEDICINE

## 2022-08-16 PROCEDURE — G8754 DIAS BP LESS 90: HCPCS | Performed by: INTERNAL MEDICINE

## 2022-08-16 PROCEDURE — G9899 SCRN MAM PERF RSLTS DOC: HCPCS | Performed by: INTERNAL MEDICINE

## 2022-08-16 PROCEDURE — 1123F ACP DISCUSS/DSCN MKR DOCD: CPT | Performed by: INTERNAL MEDICINE

## 2022-08-16 PROCEDURE — 3017F COLORECTAL CA SCREEN DOC REV: CPT | Performed by: INTERNAL MEDICINE

## 2022-08-16 PROCEDURE — 1101F PT FALLS ASSESS-DOCD LE1/YR: CPT | Performed by: INTERNAL MEDICINE

## 2022-08-16 PROCEDURE — G8400 PT W/DXA NO RESULTS DOC: HCPCS | Performed by: INTERNAL MEDICINE

## 2022-08-16 PROCEDURE — G8427 DOCREV CUR MEDS BY ELIG CLIN: HCPCS | Performed by: INTERNAL MEDICINE

## 2022-08-16 PROCEDURE — G8420 CALC BMI NORM PARAMETERS: HCPCS | Performed by: INTERNAL MEDICINE

## 2022-08-16 NOTE — PROGRESS NOTES
Identified pt with two pt identifiers(name and ). Reviewed record in preparation for visit and have obtained necessary documentation. Chief Complaint   Patient presents with    Follow-up    Hypertension        Vitals:    22 1450   BP: 138/68   Pulse: 86   Resp: 19   Temp: 98 °F (36.7 °C)   TempSrc: Oral   SpO2: 98%   Weight: 112 lb (50.8 kg)   Height: 5' 3\" (1.6 m)   PainSc:   0 - No pain       Health Maintenance Due   Topic    COVID-19 Vaccine (1)    Pneumococcal 65+ years (1 - PCV)    Shingrix Vaccine Age 50> (1 of 2)       1. \"Have you been to the ER, urgent care clinic since your last visit? Hospitalized since your last visit? \" No    2. \"Have you seen or consulted any other health care providers outside of the [address] [place] [address] since your last visit? \" No     3. For patients over 45: Has the patient had a colonoscopy? Yes - no Care Gap present     If the patient is female:    4. For patients over 40: Has the patient had a mammogram? Yes - no Care Gap present    5. For patients over 21: Has the patient had a pap smear? NA - based on age    Current Outpatient Medications   Medication Instructions    amLODIPine (NORVASC) 10 mg, Oral, DAILY    fluticasone-umeclidinium-vilanterol (Trelegy Ellipta) 100-62.5-25 mcg inhaler 1 Puff, Inhalation, DAILY       No Known Allergies    Immunization History   Administered Date(s) Administered    Influenza High Dose Vaccine PF 2018       No past medical history on file.

## 2022-08-21 PROBLEM — R79.89 PRERENAL AZOTEMIA: Status: ACTIVE | Noted: 2022-08-21

## 2022-08-21 NOTE — PROGRESS NOTES
[address] [facility]  [place] [address]  [address] [facility] [address]  Phone:  [phone]  Fax: [phone]       Chief Complaint   Patient presents with    Follow-up    Hypertension   . SUBJECTIVE:    Chris Garcia is a 71 y.o. female comes in for return visit stating that she has done well. Her COPD is reasonably stable. She is not coughing much currently. This is directly related to the maintenance inhaler that she has. She has a past history of primary hypertension, chronic anemia secondary to G6PD deficiency, as well as mild renal insufficiency on the basis of pre-renal azotemia. She continues to work part time at [employer] in the [employer]. Current Outpatient Medications   Medication Sig Dispense Refill    amLODIPine (NORVASC) 10 mg tablet Take 1 Tablet by mouth daily. 90 Tablet 3    fluticasone-umeclidinium-vilanterol (Trelegy Ellipta) 100-62.5-25 mcg inhaler Take 1 Puff by inhalation daily. 1 Each 11     No past medical history on file. Past Surgical History:   Procedure Laterality Date    COLONOSCOPY N/A 5/23/2016    COLONOSCOPY performed by Marsha Gallegos MD at [facility] ENDOSCOPY    HX BREAST BIOPSY Right     Benign. Patient thinks biopsy was on rt breast lower areolar area. Along time ago.     HX GI      colonscopy - about 2011    HX GYN      hysterectomy    HX HYSTERECTOMY      HX ORTHOPAEDIC      surgery for broken foot - right     No Known Allergies      REVIEW OF SYSTEMS:  General: negative for - chills or fever  ENT: negative for - headaches, nasal congestion or tinnitus  Respiratory: negative for - cough, hemoptysis, shortness of breath or wheezing  Cardiovascular : negative for - chest pain, edema, palpitations or shortness of breath  Gastrointestinal: negative for - abdominal pain, blood in stools, heartburn or nausea/vomiting  Genito-Urinary: no dysuria, trouble voiding, or hematuria  Musculoskeletal: negative for - gait disturbance, joint pain, joint stiffness or joint swelling  Neurological: no TIA or stroke symptoms  Hematologic: no bruises, no bleeding, no swollen glands  Integument: no lumps, mole changes, nail changes or rash  Endocrine: no malaise/lethargy or unexpected weight changes      Social History     Socioeconomic History    Marital status: SINGLE    Number of children: 3   Occupational History    Occupation: retired cna    Occupation: Current  [employer]   Tobacco Use    Smoking status: Never    Smokeless tobacco: Never   Vaping Use    Vaping Use: Never used   Substance and Sexual Activity    Alcohol use: No    Drug use: No    Sexual activity: Not Currently     Family History   Problem Relation Age of Onset    Cancer Sister 39        colon ca       OBJECTIVE:    Visit Vitals  /68 (BP 1 Location: Left upper arm, BP Patient Position: Sitting, BP Cuff Size: Adult)   Pulse 86   Temp 98 °F (36.7 °C) (Oral)   Resp 19   Ht 5' 3\" (1.6 m)   Wt 112 lb (50.8 kg)   SpO2 98%   BMI 19.84 kg/m²     CONSTITUTIONAL: well , well nourished, appears age appropriate  EYES: perrla, eom intact  ENMT:moist mucous membranes, pharynx clear  NECK: supple. Thyroid normal  RESPIRATORY: Chest: clear to ascultation and percussion   CARDIOVASCULAR: Heart: regular rate and rhythm  GASTROINTESTINAL: Abdomen: soft, bowel sounds active  HEMATOLOGIC: no pathological lymph nodes palpated  MUSCULOSKELETAL: Extremities: no edema, pulse 1+   INTEGUMENT: No unusual rashes or suspicious skin lesions noted. Nails appear normal.  NEUROLOGIC: non-focal exam   MENTAL STATUS: alert and oriented, appropriate affect      ASSESSMENT:  1. Panlobular emphysema ([address]    2. Essential hypertension    3. Acquired hemolytic anemia (HCC)    4. G6PD deficiency    5. Prerenal azotemia        PLAN:  1. The patient does have COPD, but this is stable. Hopefully, she will not progress to the point where she would require oxygen.   2. Blood pressure is excellent, no adjustments are made. 3. She does have a G6PD deficiency leading to chronic anemia, but this has been stable. 4. Finally she has a pre-renal azotemia. Labs will be checked on her return visit. I do emphasize the importance of her increasing her intake of liquids. Follow-up and Dispositions    Return in about 4 months (around 12/16/2022).            Robbin Hare MD

## 2022-12-19 ENCOUNTER — OFFICE VISIT (OUTPATIENT)
Dept: INTERNAL MEDICINE CLINIC | Age: 72
End: 2022-12-19
Payer: MEDICARE

## 2022-12-19 VITALS
WEIGHT: 115.2 LBS | DIASTOLIC BLOOD PRESSURE: 77 MMHG | OXYGEN SATURATION: 95 % | TEMPERATURE: 98.6 F | HEIGHT: 63 IN | BODY MASS INDEX: 20.41 KG/M2 | HEART RATE: 76 BPM | SYSTOLIC BLOOD PRESSURE: 135 MMHG | RESPIRATION RATE: 20 BRPM

## 2022-12-19 DIAGNOSIS — D59.9 ACQUIRED HEMOLYTIC ANEMIA (HCC): Primary | ICD-10-CM

## 2022-12-19 DIAGNOSIS — J43.1 PANLOBULAR EMPHYSEMA (HCC): ICD-10-CM

## 2022-12-19 DIAGNOSIS — E78.5 DYSLIPIDEMIA: ICD-10-CM

## 2022-12-19 DIAGNOSIS — I10 ESSENTIAL HYPERTENSION: ICD-10-CM

## 2022-12-19 DIAGNOSIS — E04.9 NODULAR GOITER: ICD-10-CM

## 2022-12-19 NOTE — PROGRESS NOTES
Chief Complaint   Patient presents with    Hypertension     1. Have you been to the ER, urgent care clinic since your last visit? Hospitalized since your last visit? No    2. Have you seen or consulted any other health care providers outside of the [address] [place] [address] since your last visit? Include any pap smears or colon screening.  No

## 2022-12-19 NOTE — PROGRESS NOTES
[address] [place] [address] and Primary Care  [place] [address]  [address] [place] [address]  Phone:  [phone]  Fax: [phone]       Chief Complaint   Patient presents with    Hypertension   . SUBJECTIVE:    Natalie Watson is a 71 y.o. female comes in for return visit stating that she doing well. She has no complaints. She has mild shortness of breath, but was able to function independently. She works five days, five hours per day. She has not had much in the way of coughing or audible wheezing. She has a past history of primary hypertension. Her overall cardiovascular risk is relatively low. Current Outpatient Medications   Medication Sig Dispense Refill    Trelegy Ellipta 100-62.5-25 mcg inhaler INHALE 1 PUFF EVERY  Blister 3    amLODIPine (NORVASC) 10 mg tablet Take 1 Tablet by mouth daily. 90 Tablet 3     History reviewed. No pertinent past medical history. Past Surgical History:   Procedure Laterality Date    COLONOSCOPY N/A 5/23/2016    COLONOSCOPY performed by Ruthie Daniels MD at [facility] ENDOSCOPY    HX BREAST BIOPSY Right     Benign. Patient thinks biopsy was on rt breast lower areolar area. Along time ago.     HX GI      colonscopy - about 2011    HX GYN      hysterectomy    HX HYSTERECTOMY      HX ORTHOPAEDIC      surgery for broken foot - right     No Known Allergies      REVIEW OF SYSTEMS:  General: negative for - chills or fever  ENT: negative for - headaches, nasal congestion or tinnitus  Respiratory: negative for - cough, hemoptysis, shortness of breath or wheezing  Cardiovascular : negative for - chest pain, edema, palpitations or shortness of breath  Gastrointestinal: negative for - abdominal pain, blood in stools, heartburn or nausea/vomiting  Genito-Urinary: no dysuria, trouble voiding, or hematuria  Musculoskeletal: negative for - gait disturbance, joint pain, joint stiffness or joint swelling  Neurological: no TIA or stroke symptoms  Hematologic: no bruises, no bleeding, no swollen glands  Integument: no lumps, mole changes, nail changes or rash  Endocrine: no malaise/lethargy or unexpected weight changes      Social History     Socioeconomic History    Marital status: SINGLE    Number of children: 3   Occupational History    Occupation: retired cna    Occupation: Current  [employer]   Tobacco Use    Smoking status: Never    Smokeless tobacco: Never   Vaping Use    Vaping Use: Never used   Substance and Sexual Activity    Alcohol use: No    Drug use: No    Sexual activity: Not Currently     Family History   Problem Relation Age of Onset    Cancer Sister 39        colon ca       OBJECTIVE:    Visit Vitals  /77 (BP 1 Location: Left upper arm, BP Patient Position: Sitting)   Pulse 76   Temp 98.6 °F (37 °C) (Oral)   Resp 20   Ht 5' 3\" (1.6 m)   Wt 115 lb 3.2 oz (52.3 kg)   SpO2 95%   BMI 20.41 kg/m²     CONSTITUTIONAL: well , well nourished, appears age appropriate  EYES: perrla, eom intact  ENMT:moist mucous membranes, pharynx clear  NECK: supple. Thyroid normal  RESPIRATORY: Chest: clear to ascultation and percussion   CARDIOVASCULAR: Heart: regular rate and rhythm  GASTROINTESTINAL: Abdomen: soft, bowel sounds active  HEMATOLOGIC: no pathological lymph nodes palpated  MUSCULOSKELETAL: Extremities: no edema, pulse 1+   INTEGUMENT: No unusual rashes or suspicious skin lesions noted. Nails appear normal.  NEUROLOGIC: non-focal exam   MENTAL STATUS: alert and oriented, appropriate affect      ASSESSMENT:  1. Acquired hemolytic anemia (HCC)    2. Essential hypertension    3. Nodular goiter    4. Panlobular emphysema ([address]    5. Dyslipidemia        PLAN:  1. The patient is quite anemic because of G6PD deficiency. 2. Blood pressure is excellent, no adjustments are made. 3. She does have a small nodular goiter, but this is unchanged. 4.  Interestingly she does have significant COPD related to passive cigarette smoking for years from her . She is reasonably stable and uses an inhaler on a regular basis. 5. Finally she has an increased cardiovascular risk as a direct result of not only her age, but her long history of passive cigarette smoking contact. 6. Her daughter accompanies her today. Orders Placed This Encounter    APOLIPOPROTEIN B    CBC WITH AUTOMATED DIFF    CRP, HIGH SENSITIVITY    LIPID PANEL    METABOLIC PANEL, COMPREHENSIVE    TSH 3RD GENERATION    URINALYSIS W/ RFLX MICROSCOPIC         Follow-up and Dispositions    Return in about 6 months (around 6/19/2023).            Clementine Jiménez MD

## 2022-12-20 LAB
ALBUMIN SERPL-MCNC: 4.2 G/DL (ref 3.5–5)
ALBUMIN/GLOB SERPL: 1.3 {RATIO} (ref 1.1–2.2)
ALP SERPL-CCNC: 67 U/L (ref 45–117)
ALT SERPL-CCNC: 16 U/L (ref 12–78)
ANION GAP SERPL CALC-SCNC: 4 MMOL/L (ref 5–15)
APPEARANCE UR: CLEAR
AST SERPL-CCNC: 13 U/L (ref 15–37)
BACTERIA URNS QL MICRO: ABNORMAL /HPF
BASOPHILS # BLD: 0.1 K/UL (ref 0–0.1)
BASOPHILS NFR BLD: 1 % (ref 0–1)
BILIRUB SERPL-MCNC: 0.6 MG/DL (ref 0.2–1)
BILIRUB UR QL: NEGATIVE
BUN SERPL-MCNC: 15 MG/DL (ref 6–20)
BUN/CREAT SERPL: 13 (ref 12–20)
CALCIUM SERPL-MCNC: 9.3 MG/DL (ref 8.5–10.1)
CHLORIDE SERPL-SCNC: 113 MMOL/L (ref 97–108)
CHOLEST SERPL-MCNC: 183 MG/DL
CO2 SERPL-SCNC: 29 MMOL/L (ref 21–32)
COLOR UR: ABNORMAL
CREAT SERPL-MCNC: 1.17 MG/DL (ref 0.55–1.02)
CRP SERPL HS-MCNC: 2.3 MG/L
DIFFERENTIAL METHOD BLD: ABNORMAL
EOSINOPHIL # BLD: 0.2 K/UL (ref 0–0.4)
EOSINOPHIL NFR BLD: 3 % (ref 0–7)
EPITH CASTS URNS QL MICRO: ABNORMAL /LPF
ERYTHROCYTE [DISTWIDTH] IN BLOOD BY AUTOMATED COUNT: 12.6 % (ref 11.5–14.5)
GLOBULIN SER CALC-MCNC: 3.2 G/DL (ref 2–4)
GLUCOSE SERPL-MCNC: 68 MG/DL (ref 65–100)
GLUCOSE UR STRIP.AUTO-MCNC: NEGATIVE MG/DL
HCT VFR BLD AUTO: 35.6 % (ref 35–47)
HDLC SERPL-MCNC: 72 MG/DL
HDLC SERPL: 2.5 {RATIO} (ref 0–5)
HGB BLD-MCNC: 11.5 G/DL (ref 11.5–16)
HGB UR QL STRIP: NEGATIVE
HYALINE CASTS URNS QL MICRO: ABNORMAL /LPF (ref 0–5)
IMM GRANULOCYTES # BLD AUTO: 0 K/UL (ref 0–0.04)
IMM GRANULOCYTES NFR BLD AUTO: 0 % (ref 0–0.5)
KETONES UR QL STRIP.AUTO: NEGATIVE MG/DL
LDLC SERPL CALC-MCNC: 95 MG/DL (ref 0–100)
LEUKOCYTE ESTERASE UR QL STRIP.AUTO: ABNORMAL
LYMPHOCYTES # BLD: 1.6 K/UL (ref 0.8–3.5)
LYMPHOCYTES NFR BLD: 30 % (ref 12–49)
MCH RBC QN AUTO: 32.1 PG (ref 26–34)
MCHC RBC AUTO-ENTMCNC: 32.3 G/DL (ref 30–36.5)
MCV RBC AUTO: 99.4 FL (ref 80–99)
MONOCYTES # BLD: 0.6 K/UL (ref 0–1)
MONOCYTES NFR BLD: 10 % (ref 5–13)
NEUTS SEG # BLD: 2.9 K/UL (ref 1.8–8)
NEUTS SEG NFR BLD: 56 % (ref 32–75)
NITRITE UR QL STRIP.AUTO: NEGATIVE
NRBC # BLD: 0 K/UL (ref 0–0.01)
NRBC BLD-RTO: 0 PER 100 WBC
PH UR STRIP: 6 [PH] (ref 5–8)
PLATELET # BLD AUTO: 225 K/UL (ref 150–400)
PMV BLD AUTO: 10.3 FL (ref 8.9–12.9)
POTASSIUM SERPL-SCNC: 4.2 MMOL/L (ref 3.5–5.1)
PROT SERPL-MCNC: 7.4 G/DL (ref 6.4–8.2)
PROT UR STRIP-MCNC: NEGATIVE MG/DL
RBC # BLD AUTO: 3.58 M/UL (ref 3.8–5.2)
RBC #/AREA URNS HPF: ABNORMAL /HPF (ref 0–5)
SODIUM SERPL-SCNC: 146 MMOL/L (ref 136–145)
SP GR UR REFRACTOMETRY: 1.01 (ref 1–1.03)
TRIGL SERPL-MCNC: 80 MG/DL (ref ?–150)
TSH SERPL DL<=0.05 MIU/L-ACNC: 1.38 UIU/ML (ref 0.36–3.74)
UROBILINOGEN UR QL STRIP.AUTO: 1 EU/DL (ref 0.2–1)
VLDLC SERPL CALC-MCNC: 16 MG/DL
WBC # BLD AUTO: 5.3 K/UL (ref 3.6–11)
WBC URNS QL MICRO: ABNORMAL /HPF (ref 0–4)

## 2022-12-21 LAB — APO B SERPL-MCNC: 74 MG/DL

## 2023-08-14 SDOH — ECONOMIC STABILITY: HOUSING INSECURITY
IN THE LAST 12 MONTHS, WAS THERE A TIME WHEN YOU DID NOT HAVE A STEADY PLACE TO SLEEP OR SLEPT IN A SHELTER (INCLUDING NOW)?: NO

## 2023-08-14 SDOH — ECONOMIC STABILITY: FOOD INSECURITY: WITHIN THE PAST 12 MONTHS, THE FOOD YOU BOUGHT JUST DIDN'T LAST AND YOU DIDN'T HAVE MONEY TO GET MORE.: NEVER TRUE

## 2023-08-14 SDOH — ECONOMIC STABILITY: FOOD INSECURITY: WITHIN THE PAST 12 MONTHS, YOU WORRIED THAT YOUR FOOD WOULD RUN OUT BEFORE YOU GOT MONEY TO BUY MORE.: NEVER TRUE

## 2023-08-14 SDOH — ECONOMIC STABILITY: TRANSPORTATION INSECURITY
IN THE PAST 12 MONTHS, HAS LACK OF TRANSPORTATION KEPT YOU FROM MEETINGS, WORK, OR FROM GETTING THINGS NEEDED FOR DAILY LIVING?: NO

## 2023-08-14 SDOH — ECONOMIC STABILITY: INCOME INSECURITY: HOW HARD IS IT FOR YOU TO PAY FOR THE VERY BASICS LIKE FOOD, HOUSING, MEDICAL CARE, AND HEATING?: NOT HARD AT ALL

## 2023-08-15 ENCOUNTER — OFFICE VISIT (OUTPATIENT)
Facility: CLINIC | Age: 73
End: 2023-08-15
Payer: MEDICARE

## 2023-08-15 VITALS
SYSTOLIC BLOOD PRESSURE: 139 MMHG | WEIGHT: 113.1 LBS | TEMPERATURE: 98.1 F | BODY MASS INDEX: 20.04 KG/M2 | RESPIRATION RATE: 18 BRPM | HEIGHT: 63 IN | OXYGEN SATURATION: 96 % | HEART RATE: 88 BPM | DIASTOLIC BLOOD PRESSURE: 67 MMHG

## 2023-08-15 DIAGNOSIS — R49.0 HOARSENESS: Primary | ICD-10-CM

## 2023-08-15 DIAGNOSIS — J43.1 PANLOBULAR EMPHYSEMA (HCC): ICD-10-CM

## 2023-08-15 DIAGNOSIS — Z00.00 MEDICARE ANNUAL WELLNESS VISIT, SUBSEQUENT: ICD-10-CM

## 2023-08-15 DIAGNOSIS — E04.9 NODULAR GOITER: ICD-10-CM

## 2023-08-15 DIAGNOSIS — E78.5 DYSLIPIDEMIA: ICD-10-CM

## 2023-08-15 DIAGNOSIS — N18.31 STAGE 3A CHRONIC KIDNEY DISEASE (HCC): ICD-10-CM

## 2023-08-15 DIAGNOSIS — I10 ESSENTIAL HYPERTENSION: ICD-10-CM

## 2023-08-15 PROCEDURE — 3023F SPIROM DOC REV: CPT | Performed by: INTERNAL MEDICINE

## 2023-08-15 PROCEDURE — 3074F SYST BP LT 130 MM HG: CPT | Performed by: INTERNAL MEDICINE

## 2023-08-15 PROCEDURE — 1090F PRES/ABSN URINE INCON ASSESS: CPT | Performed by: INTERNAL MEDICINE

## 2023-08-15 PROCEDURE — G0439 PPPS, SUBSEQ VISIT: HCPCS | Performed by: INTERNAL MEDICINE

## 2023-08-15 PROCEDURE — G8427 DOCREV CUR MEDS BY ELIG CLIN: HCPCS | Performed by: INTERNAL MEDICINE

## 2023-08-15 PROCEDURE — 1123F ACP DISCUSS/DSCN MKR DOCD: CPT | Performed by: INTERNAL MEDICINE

## 2023-08-15 PROCEDURE — 3078F DIAST BP <80 MM HG: CPT | Performed by: INTERNAL MEDICINE

## 2023-08-15 PROCEDURE — G8420 CALC BMI NORM PARAMETERS: HCPCS | Performed by: INTERNAL MEDICINE

## 2023-08-15 PROCEDURE — 1036F TOBACCO NON-USER: CPT | Performed by: INTERNAL MEDICINE

## 2023-08-15 PROCEDURE — 99214 OFFICE O/P EST MOD 30 MIN: CPT | Performed by: INTERNAL MEDICINE

## 2023-08-15 PROCEDURE — G8400 PT W/DXA NO RESULTS DOC: HCPCS | Performed by: INTERNAL MEDICINE

## 2023-08-15 PROCEDURE — 3017F COLORECTAL CA SCREEN DOC REV: CPT | Performed by: INTERNAL MEDICINE

## 2023-08-15 ASSESSMENT — LIFESTYLE VARIABLES
HOW MANY STANDARD DRINKS CONTAINING ALCOHOL DO YOU HAVE ON A TYPICAL DAY: PATIENT DOES NOT DRINK
HOW OFTEN DO YOU HAVE A DRINK CONTAINING ALCOHOL: NEVER

## 2023-08-15 ASSESSMENT — PATIENT HEALTH QUESTIONNAIRE - PHQ9
2. FEELING DOWN, DEPRESSED OR HOPELESS: 1
SUM OF ALL RESPONSES TO PHQ QUESTIONS 1-9: 2
SUM OF ALL RESPONSES TO PHQ9 QUESTIONS 1 & 2: 2
SUM OF ALL RESPONSES TO PHQ QUESTIONS 1-9: 2
SUM OF ALL RESPONSES TO PHQ QUESTIONS 1-9: 2
SUM OF ALL RESPONSES TO PHQ9 QUESTIONS 1 & 2: 2
SUM OF ALL RESPONSES TO PHQ QUESTIONS 1-9: 2
SUM OF ALL RESPONSES TO PHQ QUESTIONS 1-9: 2
2. FEELING DOWN, DEPRESSED OR HOPELESS: 1
SUM OF ALL RESPONSES TO PHQ QUESTIONS 1-9: 2
SUM OF ALL RESPONSES TO PHQ QUESTIONS 1-9: 2
1. LITTLE INTEREST OR PLEASURE IN DOING THINGS: 1
SUM OF ALL RESPONSES TO PHQ QUESTIONS 1-9: 2
1. LITTLE INTEREST OR PLEASURE IN DOING THINGS: 1

## 2023-08-16 LAB
ANION GAP SERPL CALC-SCNC: 4 MMOL/L (ref 5–15)
BUN SERPL-MCNC: 13 MG/DL (ref 6–20)
BUN/CREAT SERPL: 11 (ref 12–20)
CALCIUM SERPL-MCNC: 9.3 MG/DL (ref 8.5–10.1)
CHLORIDE SERPL-SCNC: 112 MMOL/L (ref 97–108)
CO2 SERPL-SCNC: 28 MMOL/L (ref 21–32)
CREAT SERPL-MCNC: 1.14 MG/DL (ref 0.55–1.02)
GLUCOSE SERPL-MCNC: 91 MG/DL (ref 65–100)
POTASSIUM SERPL-SCNC: 3.6 MMOL/L (ref 3.5–5.1)
SODIUM SERPL-SCNC: 144 MMOL/L (ref 136–145)
TSH SERPL DL<=0.05 MIU/L-ACNC: 1.41 UIU/ML (ref 0.36–3.74)

## 2023-08-17 LAB — APO B SERPL-MCNC: 73 MG/DL

## 2023-09-15 ENCOUNTER — OFFICE VISIT (OUTPATIENT)
Age: 73
End: 2023-09-15

## 2023-09-15 VITALS
BODY MASS INDEX: 20.02 KG/M2 | OXYGEN SATURATION: 92 % | WEIGHT: 113 LBS | DIASTOLIC BLOOD PRESSURE: 82 MMHG | SYSTOLIC BLOOD PRESSURE: 138 MMHG | HEART RATE: 73 BPM | HEIGHT: 63 IN

## 2023-09-15 DIAGNOSIS — K21.9 LARYNGOPHARYNGEAL REFLUX (LPR): ICD-10-CM

## 2023-09-15 DIAGNOSIS — J38.7 LARYNGEAL NODULE: ICD-10-CM

## 2023-09-15 DIAGNOSIS — R49.0 DYSPHONIA: Primary | ICD-10-CM

## 2023-09-15 RX ORDER — OMEPRAZOLE 40 MG/1
40 CAPSULE, DELAYED RELEASE ORAL
Qty: 90 CAPSULE | Refills: 1 | Status: SHIPPED | OUTPATIENT
Start: 2023-09-15

## 2023-09-15 NOTE — PROGRESS NOTES
Flexible Fiberoptic Laryngoscopy    Anesthesia: Topical 4% Lidocaine, Oxymetazoline    Description of Procedure: Verbal informed consent was obtained. After application of topical anesthetic and decongestant, the flexible fiberoptic endoscope was introduced to the patient's nare. It was passed through the nose and into the pharynx. The scope was then withdrawn and repeated on the opposing nare. The patient tolerated the procedure well. Findings:   Nasal Cavity: Normal nasal cavity, no polyposis or purulence. Middle meatus clear bilaterally. Nasopharynx: No overt masses or lesions. Base of tongue: Vallecula & epiglottis unremarkable. Clear pyriform sinus. TVC: right true vocal fold nodule, left-sided reactive area. Normal mobility. Subglottis: Visualized subglottis appears patent. Postcricoid edema, some cobblestoning of the posterior pharyngeal wall mucosa. Assessment/Plan:   Assessment:   Lesli Burnette is a 68 y.o. female with likely LPR, and a laryngeal nodule    Plan:   Laryngeal nodule -patient scope exam is also consistent with reflux. Will trial a course of PPI and voice rest  If laryngeal nodule is not resolved or improved by follow-up visit we will plan for direct laryngoscopy and biopsy  Nodule is likely benign, but discussed with patient given her history of smoking, secondhand smoke and COPD there is a chance it could be malignancy. Will need close follow-up and may need surgical biopsy    No orders of the defined types were placed in this encounter. No follow-ups on file. Plan for medical issues were discussed with patient including observation, medical management, and including possible surgical/procedural intervention if they fail conservative therapy. The risks and benefits of the considered procedures were discussed with the patient. All patient questions were answered. The patient was instructed to return to clinic if no improvement or progression of symptoms.  Signs

## 2023-10-06 ENCOUNTER — OFFICE VISIT (OUTPATIENT)
Age: 73
End: 2023-10-06

## 2023-10-06 VITALS
WEIGHT: 113 LBS | BODY MASS INDEX: 20.02 KG/M2 | HEART RATE: 77 BPM | HEIGHT: 63 IN | OXYGEN SATURATION: 98 % | DIASTOLIC BLOOD PRESSURE: 80 MMHG | RESPIRATION RATE: 17 BRPM | SYSTOLIC BLOOD PRESSURE: 134 MMHG

## 2023-10-06 DIAGNOSIS — K21.9 LARYNGOPHARYNGEAL REFLUX (LPR): ICD-10-CM

## 2023-10-06 DIAGNOSIS — R49.0 DYSPHONIA: Primary | ICD-10-CM

## 2023-10-06 DIAGNOSIS — J38.7 LARYNGEAL NODULE: ICD-10-CM

## 2023-10-06 NOTE — PROGRESS NOTES
Subjective:   Laurita Bennett   68 y.o.   1950     Refered by: No referring provider defined for this encounter. New Patient Visit  Chief Compliant: dysphonia     History of Present Illness:  Laurita Bennett is a 68 y.o. female with past medical history of COPD, CKD stage III, HTN, who presents today for evaluation of dysphonia. Patient was previously evaluated by outside ENT several years ago for dysphonia. She does not know the results. Her symptoms improved for a while, but now have gone bad again. This problem has been going on for several years over the last several months has been noticing worsening dysphonia. 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 Patient has extensive history of secondhand smoke exposure, as well as smoking herself. She stopped smoking 40 years ago, but her  continued for some time. Denies drinking, marijuana use, or other drug use. No dysphagia or choking coughing with food. Interval Hx:   10/6/23:   Dysphonia mildly improved. Has been taking PPI as prescribed. Review of Systems  Consitutional: denies fever, excessive weight gain or loss. Eyes: denies diplopia, eye pain. Integumentary: denies new concerning skin lesions. Ears, Nose, Mouth, Throat: denies except as per HPI. Endocrine: denies hot or cold intolerance, increased thirst.  Respiratory: denies cough, hemoptysis, wheezing  Gastrointestinal: denies trouble swallowing, nausea, emesis, regurgitation  Musculoskeletal: denies muscle weakness or wasting  Cardiovascular: denies chest pain, shortness of breath  Neurologic: denies seizures, numbness or tingling, syncope  Hematologic: denies easy bleeding or bruising       Past Medical History:   Diagnosis Date    Hypertension About  5 yrs. ago     Past Surgical History:   Procedure Laterality Date    BREAST BIOPSY Right     Benign. Patient thinks biopsy was on rt breast lower areolar area. Along time ago.     COLONOSCOPY N/A 5/23/2016    COLONOSCOPY

## 2023-11-07 ENCOUNTER — OFFICE VISIT (OUTPATIENT)
Age: 73
End: 2023-11-07
Payer: MEDICARE

## 2023-11-07 VITALS
BODY MASS INDEX: 20.02 KG/M2 | WEIGHT: 113 LBS | SYSTOLIC BLOOD PRESSURE: 128 MMHG | DIASTOLIC BLOOD PRESSURE: 81 MMHG | HEART RATE: 78 BPM | HEIGHT: 63 IN | OXYGEN SATURATION: 95 %

## 2023-11-07 DIAGNOSIS — J38.7 LARYNGEAL NODULE: ICD-10-CM

## 2023-11-07 DIAGNOSIS — E04.9 THYROID GOITER: ICD-10-CM

## 2023-11-07 DIAGNOSIS — R49.0 DYSPHONIA: Primary | ICD-10-CM

## 2023-11-07 PROCEDURE — 99213 OFFICE O/P EST LOW 20 MIN: CPT | Performed by: STUDENT IN AN ORGANIZED HEALTH CARE EDUCATION/TRAINING PROGRAM

## 2023-11-07 PROCEDURE — 3078F DIAST BP <80 MM HG: CPT | Performed by: STUDENT IN AN ORGANIZED HEALTH CARE EDUCATION/TRAINING PROGRAM

## 2023-11-07 PROCEDURE — 31575 DIAGNOSTIC LARYNGOSCOPY: CPT | Performed by: STUDENT IN AN ORGANIZED HEALTH CARE EDUCATION/TRAINING PROGRAM

## 2023-11-07 PROCEDURE — 3074F SYST BP LT 130 MM HG: CPT | Performed by: STUDENT IN AN ORGANIZED HEALTH CARE EDUCATION/TRAINING PROGRAM

## 2023-11-07 PROCEDURE — 1123F ACP DISCUSS/DSCN MKR DOCD: CPT | Performed by: STUDENT IN AN ORGANIZED HEALTH CARE EDUCATION/TRAINING PROGRAM

## 2023-12-01 RX ORDER — AZITHROMYCIN 250 MG/1
250 TABLET, FILM COATED ORAL SEE ADMIN INSTRUCTIONS
Qty: 6 TABLET | Refills: 0 | Status: SHIPPED | OUTPATIENT
Start: 2023-12-01 | End: 2023-12-06

## 2023-12-06 RX ORDER — PREDNISONE 20 MG/1
20 TABLET ORAL 3 TIMES DAILY
Qty: 21 TABLET | Refills: 0 | Status: SHIPPED | OUTPATIENT
Start: 2023-12-06 | End: 2023-12-06

## 2023-12-06 RX ORDER — PREDNISONE 20 MG/1
20 TABLET ORAL 3 TIMES DAILY
Qty: 21 TABLET | Refills: 0 | Status: SHIPPED | OUTPATIENT
Start: 2023-12-06 | End: 2023-12-13

## 2024-02-15 ENCOUNTER — OFFICE VISIT (OUTPATIENT)
Facility: CLINIC | Age: 74
End: 2024-02-15
Payer: MEDICARE

## 2024-02-15 VITALS
TEMPERATURE: 97.9 F | RESPIRATION RATE: 16 BRPM | HEIGHT: 63 IN | SYSTOLIC BLOOD PRESSURE: 148 MMHG | HEART RATE: 89 BPM | OXYGEN SATURATION: 94 % | DIASTOLIC BLOOD PRESSURE: 76 MMHG | BODY MASS INDEX: 20.07 KG/M2 | WEIGHT: 113.3 LBS

## 2024-02-15 DIAGNOSIS — H26.9 CATARACT OF BOTH EYES, UNSPECIFIED CATARACT TYPE: Primary | ICD-10-CM

## 2024-02-15 DIAGNOSIS — J43.1 PANLOBULAR EMPHYSEMA (HCC): ICD-10-CM

## 2024-02-15 DIAGNOSIS — I10 ESSENTIAL HYPERTENSION: ICD-10-CM

## 2024-02-15 DIAGNOSIS — N18.31 STAGE 3A CHRONIC KIDNEY DISEASE (HCC): ICD-10-CM

## 2024-02-15 DIAGNOSIS — Z00.00 MEDICARE ANNUAL WELLNESS VISIT, SUBSEQUENT: ICD-10-CM

## 2024-02-15 PROCEDURE — 3078F DIAST BP <80 MM HG: CPT | Performed by: INTERNAL MEDICINE

## 2024-02-15 PROCEDURE — G8484 FLU IMMUNIZE NO ADMIN: HCPCS | Performed by: INTERNAL MEDICINE

## 2024-02-15 PROCEDURE — 3077F SYST BP >= 140 MM HG: CPT | Performed by: INTERNAL MEDICINE

## 2024-02-15 PROCEDURE — 3017F COLORECTAL CA SCREEN DOC REV: CPT | Performed by: INTERNAL MEDICINE

## 2024-02-15 PROCEDURE — 1123F ACP DISCUSS/DSCN MKR DOCD: CPT | Performed by: INTERNAL MEDICINE

## 2024-02-15 PROCEDURE — G0439 PPPS, SUBSEQ VISIT: HCPCS | Performed by: INTERNAL MEDICINE

## 2024-02-15 NOTE — PROGRESS NOTES
Chief Complaint   Patient presents with    Medicare AWV     \"Have you been to the ER, urgent care clinic since your last visit?  Hospitalized since your last visit?\"    NO    “Have you seen or consulted any other health care providers outside of [facility] since your last visit?”    NO

## 2024-02-18 PROBLEM — H26.9 CATARACT OF BOTH EYES: Status: ACTIVE | Noted: 2024-02-18

## 2024-03-06 ENCOUNTER — HOSPITAL ENCOUNTER (EMERGENCY)
Facility: HOSPITAL | Age: 74
Discharge: HOME | End: 2024-03-06
Attending: STUDENT IN AN ORGANIZED HEALTH CARE EDUCATION/TRAINING PROGRAM
Payer: MEDICARE

## 2024-03-06 ENCOUNTER — APPOINTMENT (OUTPATIENT)
Facility: HOSPITAL | Age: 74
End: 2024-03-06
Payer: MEDICARE

## 2024-03-06 VITALS
WEIGHT: 116 LBS | SYSTOLIC BLOOD PRESSURE: 159 MMHG | DIASTOLIC BLOOD PRESSURE: 93 MMHG | BODY MASS INDEX: 20.55 KG/M2 | OXYGEN SATURATION: 90 % | HEART RATE: 85 BPM | TEMPERATURE: 98.2 F | RESPIRATION RATE: 22 BRPM

## 2024-03-06 DIAGNOSIS — R55 NEAR SYNCOPE: Primary | ICD-10-CM

## 2024-03-06 DIAGNOSIS — J18.9 COMMUNITY ACQUIRED PNEUMONIA OF LEFT LOWER LOBE OF LUNG: ICD-10-CM

## 2024-03-06 LAB
ALBUMIN SERPL-MCNC: 4 G/DL (ref 3.5–5)
ALBUMIN/GLOB SERPL: 1.3 (ref 1.1–2.2)
ALP SERPL-CCNC: 68 U/L (ref 45–117)
ALT SERPL-CCNC: 18 U/L (ref 12–78)
ANION GAP SERPL CALC-SCNC: 7 MMOL/L (ref 5–15)
AST SERPL-CCNC: 22 U/L (ref 15–37)
BASOPHILS # BLD: 0.1 K/UL (ref 0–0.1)
BASOPHILS NFR BLD: 1 % (ref 0–1)
BILIRUB SERPL-MCNC: 1.2 MG/DL (ref 0.2–1)
BUN SERPL-MCNC: 14 MG/DL (ref 6–20)
BUN/CREAT SERPL: 12 (ref 12–20)
CALCIUM SERPL-MCNC: 9.1 MG/DL (ref 8.5–10.1)
CHLORIDE SERPL-SCNC: 108 MMOL/L (ref 97–108)
CREAT SERPL-MCNC: 1.21 MG/DL (ref 0.55–1.02)
EOSINOPHIL # BLD: 0.1 K/UL (ref 0–0.4)
EOSINOPHIL NFR BLD: 1 % (ref 0–7)
ERYTHROCYTE [DISTWIDTH] IN BLOOD BY AUTOMATED COUNT: 12.2 % (ref 11.5–14.5)
GLOBULIN SER CALC-MCNC: 3.2 G/DL (ref 2–4)
GLUCOSE SERPL-MCNC: 155 MG/DL (ref 65–100)
HGB BLD-MCNC: 12 G/DL (ref 11.5–16)
IMM GRANULOCYTES # BLD AUTO: 0.1 K/UL (ref 0–0.04)
IMM GRANULOCYTES NFR BLD AUTO: 1 % (ref 0–0.5)
LYMPHOCYTES # BLD: 1 K/UL (ref 0.8–3.5)
MCH RBC QN AUTO: 30.9 PG (ref 26–34)
MCHC RBC AUTO-ENTMCNC: 32.9 G/DL (ref 30–36.5)
MCV RBC AUTO: 94.1 FL (ref 80–99)
MONOCYTES # BLD: 0.7 K/UL (ref 0–1)
NEUTS SEG NFR BLD: 84 % (ref 32–75)
NRBC # BLD: 0 K/UL (ref 0–0.01)
PLATELET # BLD AUTO: 239 K/UL (ref 150–400)
POTASSIUM SERPL-SCNC: 3.6 MMOL/L (ref 3.5–5.1)
PROT SERPL-MCNC: 7.2 G/DL (ref 6.4–8.2)
RBC # BLD AUTO: 3.88 M/UL (ref 3.8–5.2)
TROPONIN I SERPL HS-MCNC: 40 NG/L (ref 0–51)
TROPONIN I SERPL HS-MCNC: 40 NG/L (ref 0–51)
WBC # BLD AUTO: 13.3 K/UL (ref 3.6–11)

## 2024-03-06 PROCEDURE — 70450 CT HEAD/BRAIN W/O DYE: CPT

## 2024-03-06 PROCEDURE — 71045 X-RAY EXAM CHEST 1 VIEW: CPT

## 2024-03-06 PROCEDURE — 85025 COMPLETE CBC W/AUTO DIFF WBC: CPT

## 2024-03-06 PROCEDURE — 80053 COMPREHEN METABOLIC PANEL: CPT

## 2024-03-06 PROCEDURE — 71275 CT ANGIOGRAPHY CHEST: CPT

## 2024-03-06 PROCEDURE — 84484 ASSAY OF TROPONIN QUANT: CPT

## 2024-03-06 PROCEDURE — 36415 COLL VENOUS BLD VENIPUNCTURE: CPT

## 2024-03-06 PROCEDURE — 6360000004 HC RX CONTRAST MEDICATION

## 2024-03-06 PROCEDURE — 99285 EMERGENCY DEPT VISIT HI MDM: CPT

## 2024-03-06 RX ORDER — AMOXICILLIN AND CLAVULANATE POTASSIUM 875; 125 MG/1; MG/1
1 TABLET, FILM COATED ORAL 2 TIMES DAILY
Qty: 10 TABLET | Refills: 0 | Status: SHIPPED | OUTPATIENT
Start: 2024-03-06 | End: 2024-03-11

## 2024-03-06 RX ORDER — AZITHROMYCIN 250 MG/1
TABLET, FILM COATED ORAL
Qty: 1 PACKET | Refills: 0 | Status: SHIPPED | OUTPATIENT
Start: 2024-03-06 | End: 2024-03-10

## 2024-03-06 RX ADMIN — IOPAMIDOL: 755 INJECTION, SOLUTION INTRAVENOUS at 14:26

## 2024-03-06 ASSESSMENT — PAIN SCALES - GENERAL: PAINLEVEL_OUTOF10: 0

## 2024-03-06 NOTE — ED NOTES
Pt discharged by MD Downing/Олег. Pt verbalized understanding of follow up appointments, Rx instructions, and results of care. No further questions or concerns at this time. Pt out of ambulatory ED accompanied by self.

## 2024-03-06 NOTE — ED PROVIDER NOTES
Patient was given the following medications:  Medications - No data to display    CONSULTS: (Who and What was discussed)  None      Chronic Conditions: COPD, hypertension    Social Determinants affecting Dx or Tx: None    Records Reviewed (source and summary of external notes): Nursing Notes and Old Medical Records    CC/HPI Summary, DDx, ED Course, and Reassessment: Patient presenting with syncope. Hemodynamically stable, vital signs are stable. No history of prior, not associated with exertion or sudden. With positive prodromal symptoms, preceding tunnel vision. No new medications, drugs or etoh. No hx of heart failure, CAD, structural heart disease. No history of seizure, no tongue biting, incontinence or post-event confusion. No black tarry stools or GI bleeding. EKG with no arrhythmogenic etiology for syncope elucidated on EKG. EKG sinus with PACs, will repeat. Consider cardiac etiology, lower suspicion given no preceding chest pain, shortness of breath, no arrhythmogenic etiology noted on  EKG. Consider PE although lower suspicion given no hx of DVT/PE, no tachycardia, no unilateral leg swelling or hypercoaguable state. Consider seizure although less likely given no hx of seizure, post-ictal period, incontinence. Doubt CVA given normal neurologic exam. Consider temporary hypoperfusion, hypovolemia, orthostasis or hypervagal response. No traumatic injury from syncope requiring intervention based on history, exam. Will obtain CBC, CMP, troponin , CXR, CT head.      ED Course as of 03/08/24 1117   Wed Mar 06, 2024   1144 EKG interpreted by me. Shows sinus with PACs with a HR of 88. No ST elevations or depressions concerning for ischemia. Normal intervals.    [NM]   1310 Patient reevaluated, has not had any further episodes of chest pain.  Troponin is 40, pending repeat, chest x-ray with left-sided infiltrate consistent with patient's previous history of cough productive cough.  Will obtain CTA to rule

## 2024-03-08 LAB
EKG ATRIAL RATE: 88 BPM
EKG DIAGNOSIS: NORMAL
EKG DIAGNOSIS: NORMAL
EKG P AXIS: 74 DEGREES
EKG P AXIS: 85 DEGREES
EKG P-R INTERVAL: 148 MS
EKG P-R INTERVAL: 160 MS
EKG Q-T INTERVAL: 332 MS
EKG Q-T INTERVAL: 356 MS
EKG QRS DURATION: 72 MS
EKG QRS DURATION: 74 MS
EKG QTC CALCULATION (BAZETT): 401 MS
EKG QTC CALCULATION (BAZETT): 418 MS
EKG R AXIS: 92 DEGREES
EKG R AXIS: 94 DEGREES
EKG T AXIS: 26 DEGREES
EKG T AXIS: 66 DEGREES
EKG VENTRICULAR RATE: 83 BPM
EKG VENTRICULAR RATE: 88 BPM

## 2024-03-11 ENCOUNTER — OFFICE VISIT (OUTPATIENT)
Facility: CLINIC | Age: 74
End: 2024-03-11
Payer: MEDICARE

## 2024-03-11 VITALS
SYSTOLIC BLOOD PRESSURE: 154 MMHG | HEART RATE: 79 BPM | WEIGHT: 109.8 LBS | BODY MASS INDEX: 19.45 KG/M2 | HEIGHT: 63 IN | TEMPERATURE: 98.3 F | RESPIRATION RATE: 18 BRPM | OXYGEN SATURATION: 91 % | DIASTOLIC BLOOD PRESSURE: 77 MMHG

## 2024-03-11 DIAGNOSIS — J18.9 PNEUMONIA OF LEFT LOWER LOBE DUE TO INFECTIOUS ORGANISM: Primary | ICD-10-CM

## 2024-03-11 DIAGNOSIS — J45.20 MILD INTERMITTENT REACTIVE AIRWAY DISEASE WITHOUT COMPLICATION: ICD-10-CM

## 2024-03-11 DIAGNOSIS — H26.9 CATARACT OF BOTH EYES, UNSPECIFIED CATARACT TYPE: ICD-10-CM

## 2024-03-11 DIAGNOSIS — J43.1 PANLOBULAR EMPHYSEMA (HCC): ICD-10-CM

## 2024-03-11 PROCEDURE — 3077F SYST BP >= 140 MM HG: CPT | Performed by: INTERNAL MEDICINE

## 2024-03-11 PROCEDURE — 3017F COLORECTAL CA SCREEN DOC REV: CPT | Performed by: INTERNAL MEDICINE

## 2024-03-11 PROCEDURE — 3078F DIAST BP <80 MM HG: CPT | Performed by: INTERNAL MEDICINE

## 2024-03-11 PROCEDURE — G8420 CALC BMI NORM PARAMETERS: HCPCS | Performed by: INTERNAL MEDICINE

## 2024-03-11 PROCEDURE — 99214 OFFICE O/P EST MOD 30 MIN: CPT | Performed by: INTERNAL MEDICINE

## 2024-03-11 PROCEDURE — G8484 FLU IMMUNIZE NO ADMIN: HCPCS | Performed by: INTERNAL MEDICINE

## 2024-03-11 PROCEDURE — G8427 DOCREV CUR MEDS BY ELIG CLIN: HCPCS | Performed by: INTERNAL MEDICINE

## 2024-03-11 PROCEDURE — G8400 PT W/DXA NO RESULTS DOC: HCPCS | Performed by: INTERNAL MEDICINE

## 2024-03-11 PROCEDURE — 1123F ACP DISCUSS/DSCN MKR DOCD: CPT | Performed by: INTERNAL MEDICINE

## 2024-03-11 PROCEDURE — 1036F TOBACCO NON-USER: CPT | Performed by: INTERNAL MEDICINE

## 2024-03-11 PROCEDURE — 1090F PRES/ABSN URINE INCON ASSESS: CPT | Performed by: INTERNAL MEDICINE

## 2024-03-11 PROCEDURE — 3023F SPIROM DOC REV: CPT | Performed by: INTERNAL MEDICINE

## 2024-03-11 SDOH — ECONOMIC STABILITY: FOOD INSECURITY: WITHIN THE PAST 12 MONTHS, THE FOOD YOU BOUGHT JUST DIDN'T LAST AND YOU DIDN'T HAVE MONEY TO GET MORE.: NEVER TRUE

## 2024-03-11 SDOH — ECONOMIC STABILITY: FOOD INSECURITY: WITHIN THE PAST 12 MONTHS, YOU WORRIED THAT YOUR FOOD WOULD RUN OUT BEFORE YOU GOT MONEY TO BUY MORE.: NEVER TRUE

## 2024-03-11 SDOH — ECONOMIC STABILITY: INCOME INSECURITY: HOW HARD IS IT FOR YOU TO PAY FOR THE VERY BASICS LIKE FOOD, HOUSING, MEDICAL CARE, AND HEATING?: NOT HARD AT ALL

## 2024-03-11 ASSESSMENT — ANXIETY QUESTIONNAIRES
2. NOT BEING ABLE TO STOP OR CONTROL WORRYING: NOT AT ALL
IF YOU CHECKED OFF ANY PROBLEMS ON THIS QUESTIONNAIRE, HOW DIFFICULT HAVE THESE PROBLEMS MADE IT FOR YOU TO DO YOUR WORK, TAKE CARE OF THINGS AT HOME, OR GET ALONG WITH OTHER PEOPLE: NOT DIFFICULT AT ALL
1. FEELING NERVOUS, ANXIOUS, OR ON EDGE: NOT AT ALL
5. BEING SO RESTLESS THAT IT IS HARD TO SIT STILL: NOT AT ALL
6. BECOMING EASILY ANNOYED OR IRRITABLE: NOT AT ALL
4. TROUBLE RELAXING: NOT AT ALL
3. WORRYING TOO MUCH ABOUT DIFFERENT THINGS: NOT AT ALL
7. FEELING AFRAID AS IF SOMETHING AWFUL MIGHT HAPPEN: NOT AT ALL
GAD7 TOTAL SCORE: 0

## 2024-03-11 ASSESSMENT — PATIENT HEALTH QUESTIONNAIRE - PHQ9
2. FEELING DOWN, DEPRESSED OR HOPELESS: NOT AT ALL
SUM OF ALL RESPONSES TO PHQ QUESTIONS 1-9: 0
SUM OF ALL RESPONSES TO PHQ QUESTIONS 1-9: 0
1. LITTLE INTEREST OR PLEASURE IN DOING THINGS: NOT AT ALL
SUM OF ALL RESPONSES TO PHQ QUESTIONS 1-9: 0
SUM OF ALL RESPONSES TO PHQ9 QUESTIONS 1 & 2: 0
SUM OF ALL RESPONSES TO PHQ QUESTIONS 1-9: 0

## 2024-03-11 NOTE — PROGRESS NOTES
Chief Complaint   Patient presents with    Follow-Up from Hospital    Cough    Congestion     \"Have you been to the ER, urgent care clinic since your last visit?  Hospitalized since your last visit?\"    YES - When: approximately 5 days ago.  Where and Why: [facility] pneumonia.    “Have you seen or consulted any other health care providers outside of [facility] since your last visit?”    NO

## 2024-03-12 LAB
BASOPHILS # BLD AUTO: 0.1 X10E3/UL (ref 0–0.2)
BASOPHILS NFR BLD AUTO: 2 %
EOSINOPHIL # BLD AUTO: 0.1 X10E3/UL (ref 0–0.4)
EOSINOPHIL NFR BLD AUTO: 2 %
ERYTHROCYTE [DISTWIDTH] IN BLOOD BY AUTOMATED COUNT: 12.3 % (ref 11.7–15.4)
HCT VFR BLD AUTO: 35.3 % (ref 34–46.6)
HGB BLD-MCNC: 11.3 G/DL (ref 11.1–15.9)
IMM GRANULOCYTES # BLD AUTO: 0 X10E3/UL (ref 0–0.1)
IMM GRANULOCYTES NFR BLD AUTO: 1 %
LYMPHOCYTES # BLD AUTO: 1.4 X10E3/UL (ref 0.7–3.1)
LYMPHOCYTES NFR BLD AUTO: 27 %
MCH RBC QN AUTO: 30.6 PG (ref 26.6–33)
MCHC RBC AUTO-ENTMCNC: 32 G/DL (ref 31.5–35.7)
MCV RBC AUTO: 96 FL (ref 79–97)
MONOCYTES # BLD AUTO: 0.6 X10E3/UL (ref 0.1–0.9)
MONOCYTES NFR BLD AUTO: 10 %
NEUTROPHILS # BLD AUTO: 3.2 X10E3/UL (ref 1.4–7)
NEUTROPHILS NFR BLD AUTO: 58 %
PLATELET # BLD AUTO: 317 X10E3/UL (ref 150–450)
RBC # BLD AUTO: 3.69 X10E6/UL (ref 3.77–5.28)
WBC # BLD AUTO: 5.4 X10E3/UL (ref 3.4–10.8)

## 2024-03-12 NOTE — PROGRESS NOTES
1. The patient has had a pneumonia in her left lower lobe.  Chest xray will be obtained, as well as a CBC.  She continues to cough, although cough is nonproductive.  This is all superimposed on her existing COPD and reactive airway disease.  I remind her of the importance of continuing her bronchodilators as prescribed.  Fortunately, it has been at least 40+ years since she smoked.  2. As far as her cataract surgery is concerned, she will have to bring a physical form by, but she is medically stable for the planned procedure.

## 2024-03-12 NOTE — PROGRESS NOTES
Comes in for return visit, stating that she went to the emergency room last Tuesday because of a near syncopal episode while at work.  She has no other associated symptoms, but she was evaluated and found to have a left lower lobe infiltrate.  She was treated for pneumonia with an antibiotic.  She states that the coughing is better now, but she continues to cough.  This is all superimposed on her existing COPD and reactive airway disease.    She plans to have cataract surgery on the 10th of April and the 24th, first with the right and then the left.  She did not bring a physical form with her.    She has a past history of primary hypertension.

## 2024-03-14 ENCOUNTER — TELEPHONE (OUTPATIENT)
Facility: CLINIC | Age: 74
End: 2024-03-14

## 2024-03-14 NOTE — TELEPHONE ENCOUNTER
Patient called stating she is still coughing so we get permission from  to call in tessalon pearls 200mg 1 tid x 10 days

## 2024-04-10 RX ORDER — FLUTICASONE FUROATE, UMECLIDINIUM BROMIDE AND VILANTEROL TRIFENATATE 100; 62.5; 25 UG/1; UG/1; UG/1
POWDER RESPIRATORY (INHALATION)
Qty: 3 EACH | Refills: 3 | Status: SHIPPED | OUTPATIENT
Start: 2024-04-10

## 2024-07-24 RX ORDER — AMLODIPINE BESYLATE 10 MG/1
TABLET ORAL
Qty: 90 TABLET | Refills: 3 | Status: SHIPPED | OUTPATIENT
Start: 2024-07-24

## 2024-10-11 RX ORDER — PREDNISONE 20 MG/1
20 TABLET ORAL 3 TIMES DAILY
Qty: 21 TABLET | Refills: 0 | Status: SHIPPED | OUTPATIENT
Start: 2024-10-11 | End: 2024-10-18

## 2024-10-16 ENCOUNTER — HOSPITAL ENCOUNTER (EMERGENCY)
Facility: HOSPITAL | Age: 74
Discharge: HOME | End: 2024-10-16
Payer: MEDICARE

## 2024-10-16 ENCOUNTER — APPOINTMENT (OUTPATIENT)
Facility: HOSPITAL | Age: 74
End: 2024-10-16
Payer: MEDICARE

## 2024-10-16 VITALS
SYSTOLIC BLOOD PRESSURE: 182 MMHG | HEART RATE: 73 BPM | HEIGHT: 63 IN | WEIGHT: 106 LBS | BODY MASS INDEX: 18.78 KG/M2 | TEMPERATURE: 98.1 F | OXYGEN SATURATION: 93 % | DIASTOLIC BLOOD PRESSURE: 85 MMHG | RESPIRATION RATE: 18 BRPM

## 2024-10-16 DIAGNOSIS — J43.9 PULMONARY EMPHYSEMA, UNSPECIFIED EMPHYSEMA TYPE (HCC): ICD-10-CM

## 2024-10-16 DIAGNOSIS — R03.0 ELEVATED BLOOD PRESSURE READING: ICD-10-CM

## 2024-10-16 DIAGNOSIS — R05.9 COUGH, UNSPECIFIED TYPE: Primary | ICD-10-CM

## 2024-10-16 LAB
FLUAV RNA SPEC QL NAA+PROBE: NOT DETECTED
FLUBV RNA SPEC QL NAA+PROBE: NOT DETECTED
SARS-COV-2 RNA RESP QL NAA+PROBE: NOT DETECTED
SOURCE: NORMAL

## 2024-10-16 PROCEDURE — 99284 EMERGENCY DEPT VISIT MOD MDM: CPT

## 2024-10-16 PROCEDURE — 71046 X-RAY EXAM CHEST 2 VIEWS: CPT

## 2024-10-16 PROCEDURE — 87636 SARSCOV2 & INF A&B AMP PRB: CPT

## 2024-10-16 RX ORDER — AZITHROMYCIN 250 MG/1
TABLET, FILM COATED ORAL
Qty: 6 TABLET | Refills: 0 | Status: SHIPPED | OUTPATIENT
Start: 2024-10-16 | End: 2024-10-26

## 2024-10-16 NOTE — ED NOTES
This RN has reviewed discharge instructions with the patient at this time. patient has been made aware of prescription(s) called into pharmacy on file for , follow up care, and diagnoses care instructions. The Patient verbalized understanding and denies any further questions. VSS and pt AOx4. Patient ambulatory out to waiting room at this time.

## 2024-10-16 NOTE — ED PROVIDER NOTES
known as: DELTASONE  Take 1 tablet by mouth 3 times daily for 7 days  Ask about: Should I take this medication?     Trelegy Ellipta 100-62.5-25 MCG/ACT Aepb inhaler  Generic drug: fluticasone-umeclidin-vilant  INHALE 1 PUFF EVERY DAY               Where to Get Your Medications        These medications were sent to [id number] - [place], [place] - [address] - P [phone] - F [phone] [address] [place] [id number]      Phone: [phone]   azithromycin 250 MG tablet           DISCONTINUED MEDICATIONS:  Discharge Medication List as of 10/16/2024  6:51 PM              I am the Primary Clinician of Record.   TARA Bennett (electronically signed)    (Please note that parts of this dictation were completed with voice recognition software. Quite often unanticipated grammatical, syntax, homophones, and other interpretive errors are inadvertently transcribed by the computer software. Please disregards these errors. Please excuse any errors that have escaped final proofreading.)         Rylie Godfrey PA  10/20/24 2573

## 2024-10-16 NOTE — DISCHARGE INSTRUCTIONS
Thank You!    It was a pleasure taking care of you in our Emergency Department today. We know that when you come to [facility], you are entrusting us with your health, comfort, and safety. Our clinicians honor that trust, and truly appreciate the opportunity to care for you and your loved ones.    If you receive a survey about your Emergency Department experience today, please fill it out.  We value your feedback. Thank you.      Rylie Godfrey PA-C    ___________________________________  I have included a copy of your lab results and/or radiologic studies from today's visit so you can have them easily available at your follow-up visit.   Recent Results (from the past 12 hour(s))   COVID-19 & Influenza Combo    Collection Time: 10/16/24  6:00 PM    Specimen: Nasopharyngeal   Result Value Ref Range    Source Nasopharyngeal      SARS-CoV-2, PCR Not detected NOTD      Rapid Influenza A By PCR Not detected NOTD      Rapid Influenza B By PCR Not detected NOTD         XR CHEST (2 VW)   Final Result      1. No acute process.   2. Emphysema.   3. Chronic pulmonary arterial hypertension.         Electronically signed by Gume Riggs MD        [unfilled]

## 2025-01-30 ENCOUNTER — OFFICE VISIT (OUTPATIENT)
Facility: CLINIC | Age: 75
End: 2025-01-30

## 2025-01-30 ENCOUNTER — APPOINTMENT (OUTPATIENT)
Facility: HOSPITAL | Age: 75
DRG: 190 | End: 2025-01-30
Payer: MEDICARE

## 2025-01-30 ENCOUNTER — HOSPITAL ENCOUNTER (INPATIENT)
Facility: HOSPITAL | Age: 75
LOS: 3 days | Discharge: HOME | DRG: 190 | End: 2025-02-02
Attending: STUDENT IN AN ORGANIZED HEALTH CARE EDUCATION/TRAINING PROGRAM | Admitting: STUDENT IN AN ORGANIZED HEALTH CARE EDUCATION/TRAINING PROGRAM
Payer: MEDICARE

## 2025-01-30 VITALS
HEIGHT: 63 IN | OXYGEN SATURATION: 90 % | BODY MASS INDEX: 19.07 KG/M2 | RESPIRATION RATE: 16 BRPM | DIASTOLIC BLOOD PRESSURE: 84 MMHG | HEART RATE: 79 BPM | SYSTOLIC BLOOD PRESSURE: 169 MMHG | WEIGHT: 107.6 LBS | TEMPERATURE: 98 F

## 2025-01-30 DIAGNOSIS — J43.1 PANLOBULAR EMPHYSEMA (HCC): ICD-10-CM

## 2025-01-30 DIAGNOSIS — E04.9 NODULAR GOITER: ICD-10-CM

## 2025-01-30 DIAGNOSIS — Z00.00 MEDICARE ANNUAL WELLNESS VISIT, SUBSEQUENT: ICD-10-CM

## 2025-01-30 DIAGNOSIS — J96.22 ACUTE ON CHRONIC RESPIRATORY FAILURE WITH HYPOXIA AND HYPERCAPNIA: ICD-10-CM

## 2025-01-30 DIAGNOSIS — I10 ESSENTIAL HYPERTENSION: ICD-10-CM

## 2025-01-30 DIAGNOSIS — J45.52 SEVERE PERSISTENT REACTIVE AIRWAY DISEASE WITH STATUS ASTHMATICUS: ICD-10-CM

## 2025-01-30 DIAGNOSIS — R06.02 SHORTNESS OF BREATH: ICD-10-CM

## 2025-01-30 DIAGNOSIS — J96.21 ACUTE ON CHRONIC RESPIRATORY FAILURE WITH HYPOXIA AND HYPERCAPNIA: ICD-10-CM

## 2025-01-30 DIAGNOSIS — J44.1 COPD EXACERBATION (HCC): ICD-10-CM

## 2025-01-30 DIAGNOSIS — R06.02 SHORTNESS OF BREATH: Primary | ICD-10-CM

## 2025-01-30 DIAGNOSIS — J96.01 ACUTE HYPOXIC RESPIRATORY FAILURE: Primary | ICD-10-CM

## 2025-01-30 DIAGNOSIS — I48.91 ATRIAL FIBRILLATION, UNSPECIFIED TYPE (HCC): ICD-10-CM

## 2025-01-30 LAB
ALBUMIN SERPL-MCNC: 3.8 G/DL (ref 3.5–5)
ALBUMIN/GLOB SERPL: 1 (ref 1.1–2.2)
ALP SERPL-CCNC: 73 U/L (ref 45–117)
ALT SERPL-CCNC: 17 U/L (ref 12–78)
ANION GAP SERPL CALC-SCNC: 5 MMOL/L (ref 2–12)
ARTERIAL PATENCY WRIST A: NORMAL
AST SERPL-CCNC: 18 U/L (ref 15–37)
BASE EXCESS BLDA CALC-SCNC: 0.9 MMOL/L
BASOPHILS # BLD: 0.02 K/UL (ref 0–0.1)
BASOPHILS NFR BLD: 0.4 % (ref 0–1)
BDY SITE: NORMAL
BILIRUB SERPL-MCNC: 0.7 MG/DL (ref 0.2–1)
BUN SERPL-MCNC: 10 MG/DL (ref 6–20)
BUN/CREAT SERPL: 10 (ref 12–20)
CALCIUM SERPL-MCNC: 9.2 MG/DL (ref 8.5–10.1)
CHLORIDE SERPL-SCNC: 110 MMOL/L (ref 97–108)
CO2 SERPL-SCNC: 26 MMOL/L (ref 21–32)
CREAT SERPL-MCNC: 1.02 MG/DL (ref 0.55–1.02)
D DIMER PPP FEU-MCNC: 0.67 MG/L FEU (ref 0–0.65)
DIFFERENTIAL METHOD BLD: ABNORMAL
EOSINOPHIL # BLD: 0.04 K/UL (ref 0–0.4)
EOSINOPHIL NFR BLD: 0.7 % (ref 0–7)
ERYTHROCYTE [DISTWIDTH] IN BLOOD BY AUTOMATED COUNT: 12.8 % (ref 11.5–14.5)
FLUAV RNA SPEC QL NAA+PROBE: NOT DETECTED
FLUBV RNA SPEC QL NAA+PROBE: NOT DETECTED
GAS FLOW.O2 O2 DELIVERY SYS: 2 L/MIN
GLOBULIN SER CALC-MCNC: 3.9 G/DL (ref 2–4)
GLUCOSE SERPL-MCNC: 101 MG/DL (ref 65–100)
HCO3 BLDA-SCNC: 25 MMOL/L (ref 22–26)
HCT VFR BLD AUTO: 33.6 % (ref 35–47)
HGB BLD-MCNC: 11 G/DL (ref 11.5–16)
IMM GRANULOCYTES # BLD AUTO: 0.02 K/UL (ref 0–0.04)
IMM GRANULOCYTES NFR BLD AUTO: 0.4 % (ref 0–0.5)
INR PPP: 1 (ref 0.9–1.1)
LYMPHOCYTES # BLD: 1.14 K/UL (ref 0.8–3.5)
LYMPHOCYTES NFR BLD: 21.2 % (ref 12–49)
MAGNESIUM SERPL-MCNC: 2.1 MG/DL (ref 1.6–2.4)
MCH RBC QN AUTO: 30.7 PG (ref 26–34)
MCHC RBC AUTO-ENTMCNC: 32.7 G/DL (ref 30–36.5)
MCV RBC AUTO: 93.9 FL (ref 80–99)
MONOCYTES # BLD: 0.36 K/UL (ref 0–1)
MONOCYTES NFR BLD: 6.7 % (ref 5–13)
NEUTS SEG # BLD: 3.81 K/UL (ref 1.8–8)
NEUTS SEG NFR BLD: 70.6 % (ref 32–75)
NRBC # BLD: 0 K/UL (ref 0–0.01)
NRBC BLD-RTO: 0 PER 100 WBC
NT PRO BNP: 480 PG/ML
PCO2 BLDA: 37 MMHG (ref 35–45)
PH BLDA: 7.44 (ref 7.35–7.45)
PLATELET # BLD AUTO: 225 K/UL (ref 150–400)
PMV BLD AUTO: 9.3 FL (ref 8.9–12.9)
PO2 BLDA: 85 MMHG (ref 80–100)
POTASSIUM SERPL-SCNC: 3.7 MMOL/L (ref 3.5–5.1)
PROT SERPL-MCNC: 7.7 G/DL (ref 6.4–8.2)
PROTHROMBIN TIME: 10.8 SEC (ref 9.2–11.2)
RBC # BLD AUTO: 3.58 M/UL (ref 3.8–5.2)
SAO2 % BLD: 97 % (ref 92–97)
SAO2% DEVICE SAO2% SENSOR NAME: NORMAL
SARS-COV-2 RNA RESP QL NAA+PROBE: NOT DETECTED
SODIUM SERPL-SCNC: 141 MMOL/L (ref 136–145)
SOURCE: NORMAL
SPECIMEN SITE: NORMAL
T4 FREE SERPL-MCNC: 1.2 NG/DL (ref 0.8–1.5)
TROPONIN I SERPL HS-MCNC: 89 NG/L (ref 0–51)
TROPONIN I SERPL HS-MCNC: 97 NG/L (ref 0–51)
TSH SERPL DL<=0.05 MIU/L-ACNC: 0.85 UIU/ML (ref 0.36–3.74)
WBC # BLD AUTO: 5.4 K/UL (ref 3.6–11)

## 2025-01-30 PROCEDURE — 36415 COLL VENOUS BLD VENIPUNCTURE: CPT

## 2025-01-30 PROCEDURE — 2500000003 HC RX 250 WO HCPCS: Performed by: STUDENT IN AN ORGANIZED HEALTH CARE EDUCATION/TRAINING PROGRAM

## 2025-01-30 PROCEDURE — 71275 CT ANGIOGRAPHY CHEST: CPT

## 2025-01-30 PROCEDURE — 6370000000 HC RX 637 (ALT 250 FOR IP): Performed by: STUDENT IN AN ORGANIZED HEALTH CARE EDUCATION/TRAINING PROGRAM

## 2025-01-30 PROCEDURE — 85025 COMPLETE CBC W/AUTO DIFF WBC: CPT

## 2025-01-30 PROCEDURE — 84443 ASSAY THYROID STIM HORMONE: CPT

## 2025-01-30 PROCEDURE — 82803 BLOOD GASES ANY COMBINATION: CPT

## 2025-01-30 PROCEDURE — 80053 COMPREHEN METABOLIC PANEL: CPT

## 2025-01-30 PROCEDURE — 36600 WITHDRAWAL OF ARTERIAL BLOOD: CPT

## 2025-01-30 PROCEDURE — 1100000000 HC RM PRIVATE

## 2025-01-30 PROCEDURE — 6370000000 HC RX 637 (ALT 250 FOR IP): Performed by: INTERNAL MEDICINE

## 2025-01-30 PROCEDURE — 99285 EMERGENCY DEPT VISIT HI MDM: CPT

## 2025-01-30 PROCEDURE — 85379 FIBRIN DEGRADATION QUANT: CPT

## 2025-01-30 PROCEDURE — 84484 ASSAY OF TROPONIN QUANT: CPT

## 2025-01-30 PROCEDURE — 2580000003 HC RX 258: Performed by: STUDENT IN AN ORGANIZED HEALTH CARE EDUCATION/TRAINING PROGRAM

## 2025-01-30 PROCEDURE — 6360000004 HC RX CONTRAST MEDICATION: Performed by: STUDENT IN AN ORGANIZED HEALTH CARE EDUCATION/TRAINING PROGRAM

## 2025-01-30 PROCEDURE — 83880 ASSAY OF NATRIURETIC PEPTIDE: CPT

## 2025-01-30 PROCEDURE — 84439 ASSAY OF FREE THYROXINE: CPT

## 2025-01-30 PROCEDURE — 6360000002 HC RX W HCPCS: Performed by: STUDENT IN AN ORGANIZED HEALTH CARE EDUCATION/TRAINING PROGRAM

## 2025-01-30 PROCEDURE — 71045 X-RAY EXAM CHEST 1 VIEW: CPT

## 2025-01-30 PROCEDURE — 83735 ASSAY OF MAGNESIUM: CPT

## 2025-01-30 PROCEDURE — 87636 SARSCOV2 & INF A&B AMP PRB: CPT

## 2025-01-30 PROCEDURE — 85610 PROTHROMBIN TIME: CPT

## 2025-01-30 PROCEDURE — 93005 ELECTROCARDIOGRAM TRACING: CPT | Performed by: STUDENT IN AN ORGANIZED HEALTH CARE EDUCATION/TRAINING PROGRAM

## 2025-01-30 PROCEDURE — 94640 AIRWAY INHALATION TREATMENT: CPT

## 2025-01-30 RX ORDER — PREDNISONE 20 MG/1
40 TABLET ORAL DAILY
Status: DISCONTINUED | OUTPATIENT
Start: 2025-02-01 | End: 2025-02-02 | Stop reason: HOSPADM

## 2025-01-30 RX ORDER — AMLODIPINE BESYLATE 5 MG/1
10 TABLET ORAL DAILY
Status: DISCONTINUED | OUTPATIENT
Start: 2025-01-30 | End: 2025-02-02 | Stop reason: HOSPADM

## 2025-01-30 RX ORDER — PREDNISONE 20 MG/1
60 TABLET ORAL ONCE
Status: COMPLETED | OUTPATIENT
Start: 2025-01-30 | End: 2025-01-30

## 2025-01-30 RX ORDER — ACETAMINOPHEN 325 MG/1
650 TABLET ORAL EVERY 6 HOURS PRN
Status: DISCONTINUED | OUTPATIENT
Start: 2025-01-30 | End: 2025-02-02 | Stop reason: HOSPADM

## 2025-01-30 RX ORDER — IPRATROPIUM BROMIDE AND ALBUTEROL SULFATE 2.5; .5 MG/3ML; MG/3ML
2 SOLUTION RESPIRATORY (INHALATION)
Status: COMPLETED | OUTPATIENT
Start: 2025-01-30 | End: 2025-01-30

## 2025-01-30 RX ORDER — SODIUM CHLORIDE 9 MG/ML
INJECTION, SOLUTION INTRAVENOUS PRN
Status: DISCONTINUED | OUTPATIENT
Start: 2025-01-30 | End: 2025-02-02 | Stop reason: HOSPADM

## 2025-01-30 RX ORDER — BUDESONIDE 0.25 MG/2ML
0.25 INHALANT ORAL
Status: DISCONTINUED | OUTPATIENT
Start: 2025-01-30 | End: 2025-02-01

## 2025-01-30 RX ORDER — ENOXAPARIN SODIUM 100 MG/ML
30 INJECTION SUBCUTANEOUS DAILY
Status: DISCONTINUED | OUTPATIENT
Start: 2025-01-30 | End: 2025-02-02 | Stop reason: HOSPADM

## 2025-01-30 RX ORDER — AZITHROMYCIN 250 MG/1
500 TABLET, FILM COATED ORAL
Status: COMPLETED | OUTPATIENT
Start: 2025-01-30 | End: 2025-01-30

## 2025-01-30 RX ORDER — POLYETHYLENE GLYCOL 3350 17 G/17G
17 POWDER, FOR SOLUTION ORAL DAILY PRN
Status: DISCONTINUED | OUTPATIENT
Start: 2025-01-30 | End: 2025-02-02 | Stop reason: HOSPADM

## 2025-01-30 RX ORDER — ONDANSETRON 4 MG/1
4 TABLET, ORALLY DISINTEGRATING ORAL EVERY 8 HOURS PRN
Status: DISCONTINUED | OUTPATIENT
Start: 2025-01-30 | End: 2025-02-02 | Stop reason: HOSPADM

## 2025-01-30 RX ORDER — SODIUM CHLORIDE 0.9 % (FLUSH) 0.9 %
5-40 SYRINGE (ML) INJECTION EVERY 12 HOURS SCHEDULED
Status: DISCONTINUED | OUTPATIENT
Start: 2025-01-30 | End: 2025-02-02 | Stop reason: HOSPADM

## 2025-01-30 RX ORDER — ACETAMINOPHEN 650 MG/1
650 SUPPOSITORY RECTAL EVERY 6 HOURS PRN
Status: DISCONTINUED | OUTPATIENT
Start: 2025-01-30 | End: 2025-02-02 | Stop reason: HOSPADM

## 2025-01-30 RX ORDER — ARFORMOTEROL TARTRATE 15 UG/2ML
15 SOLUTION RESPIRATORY (INHALATION)
Status: DISCONTINUED | OUTPATIENT
Start: 2025-01-30 | End: 2025-01-30

## 2025-01-30 RX ORDER — IPRATROPIUM BROMIDE AND ALBUTEROL SULFATE 2.5; .5 MG/3ML; MG/3ML
1 SOLUTION RESPIRATORY (INHALATION)
Status: COMPLETED | OUTPATIENT
Start: 2025-01-30 | End: 2025-01-30

## 2025-01-30 RX ORDER — SODIUM CHLORIDE 0.9 % (FLUSH) 0.9 %
5-40 SYRINGE (ML) INJECTION PRN
Status: DISCONTINUED | OUTPATIENT
Start: 2025-01-30 | End: 2025-02-02 | Stop reason: HOSPADM

## 2025-01-30 RX ORDER — BUDESONIDE 0.25 MG/2ML
0.25 INHALANT ORAL
Status: DISCONTINUED | OUTPATIENT
Start: 2025-01-30 | End: 2025-01-30

## 2025-01-30 RX ORDER — ONDANSETRON 2 MG/ML
4 INJECTION INTRAMUSCULAR; INTRAVENOUS EVERY 6 HOURS PRN
Status: DISCONTINUED | OUTPATIENT
Start: 2025-01-30 | End: 2025-02-02 | Stop reason: HOSPADM

## 2025-01-30 RX ORDER — LEVALBUTEROL INHALATION SOLUTION 0.63 MG/3ML
0.63 SOLUTION RESPIRATORY (INHALATION)
Status: DISPENSED | OUTPATIENT
Start: 2025-01-30 | End: 2025-01-31

## 2025-01-30 RX ORDER — LEVALBUTEROL INHALATION SOLUTION 0.63 MG/3ML
0.63 SOLUTION RESPIRATORY (INHALATION) EVERY 8 HOURS PRN
Status: DISCONTINUED | OUTPATIENT
Start: 2025-01-31 | End: 2025-02-02 | Stop reason: HOSPADM

## 2025-01-30 RX ORDER — LEVALBUTEROL INHALATION SOLUTION 0.63 MG/3ML
0.63 SOLUTION RESPIRATORY (INHALATION) EVERY 8 HOURS
Status: DISCONTINUED | OUTPATIENT
Start: 2025-01-30 | End: 2025-01-30

## 2025-01-30 RX ORDER — IOPAMIDOL 755 MG/ML
100 INJECTION, SOLUTION INTRAVASCULAR
Status: COMPLETED | OUTPATIENT
Start: 2025-01-30 | End: 2025-01-30

## 2025-01-30 RX ORDER — ARFORMOTEROL TARTRATE 15 UG/2ML
15 SOLUTION RESPIRATORY (INHALATION)
Status: DISCONTINUED | OUTPATIENT
Start: 2025-01-30 | End: 2025-02-01

## 2025-01-30 RX ORDER — PANTOPRAZOLE SODIUM 40 MG/1
40 TABLET, DELAYED RELEASE ORAL
Status: DISCONTINUED | OUTPATIENT
Start: 2025-01-31 | End: 2025-02-02 | Stop reason: HOSPADM

## 2025-01-30 RX ADMIN — SODIUM CHLORIDE, PRESERVATIVE FREE 10 ML: 5 INJECTION INTRAVENOUS at 20:41

## 2025-01-30 RX ADMIN — IPRATROPIUM BROMIDE 0.5 MG: 0.5 SOLUTION RESPIRATORY (INHALATION) at 20:36

## 2025-01-30 RX ADMIN — ENOXAPARIN SODIUM 30 MG: 100 INJECTION SUBCUTANEOUS at 16:36

## 2025-01-30 RX ADMIN — LEVALBUTEROL HYDROCHLORIDE 0.63 MG: 0.63 SOLUTION RESPIRATORY (INHALATION) at 20:36

## 2025-01-30 RX ADMIN — AZITHROMYCIN 500 MG: 250 TABLET, FILM COATED ORAL at 13:58

## 2025-01-30 RX ADMIN — IPRATROPIUM BROMIDE AND ALBUTEROL SULFATE 2 DOSE: .5; 3 SOLUTION RESPIRATORY (INHALATION) at 12:53

## 2025-01-30 RX ADMIN — IPRATROPIUM BROMIDE AND ALBUTEROL SULFATE 1 DOSE: .5; 3 SOLUTION RESPIRATORY (INHALATION) at 14:00

## 2025-01-30 RX ADMIN — AMLODIPINE BESYLATE 10 MG: 5 TABLET ORAL at 16:36

## 2025-01-30 RX ADMIN — AZITHROMYCIN MONOHYDRATE 500 MG: 500 INJECTION, POWDER, LYOPHILIZED, FOR SOLUTION INTRAVENOUS at 16:55

## 2025-01-30 RX ADMIN — WATER 40 MG: 1 INJECTION INTRAMUSCULAR; INTRAVENOUS; SUBCUTANEOUS at 20:40

## 2025-01-30 RX ADMIN — WATER 40 MG: 1 INJECTION INTRAMUSCULAR; INTRAVENOUS; SUBCUTANEOUS at 16:36

## 2025-01-30 RX ADMIN — IOPAMIDOL 100 ML: 755 INJECTION, SOLUTION INTRAVENOUS at 14:41

## 2025-01-30 RX ADMIN — AMOXICILLIN AND CLAVULANATE POTASSIUM 1 TABLET: 875; 125 TABLET, FILM COATED ORAL at 22:42

## 2025-01-30 RX ADMIN — BUDESONIDE 250 MCG: 0.25 INHALANT RESPIRATORY (INHALATION) at 20:36

## 2025-01-30 RX ADMIN — ARFORMOTEROL TARTRATE 15 MCG: 15 SOLUTION RESPIRATORY (INHALATION) at 20:36

## 2025-01-30 RX ADMIN — PREDNISONE 60 MG: 20 TABLET ORAL at 13:59

## 2025-01-30 SDOH — ECONOMIC STABILITY: FOOD INSECURITY: WITHIN THE PAST 12 MONTHS, THE FOOD YOU BOUGHT JUST DIDN'T LAST AND YOU DIDN'T HAVE MONEY TO GET MORE.: NEVER TRUE

## 2025-01-30 SDOH — ECONOMIC STABILITY: FOOD INSECURITY: WITHIN THE PAST 12 MONTHS, YOU WORRIED THAT YOUR FOOD WOULD RUN OUT BEFORE YOU GOT MONEY TO BUY MORE.: NEVER TRUE

## 2025-01-30 ASSESSMENT — PATIENT HEALTH QUESTIONNAIRE - PHQ9
1. LITTLE INTEREST OR PLEASURE IN DOING THINGS: NOT AT ALL
2. FEELING DOWN, DEPRESSED OR HOPELESS: NOT AT ALL
SUM OF ALL RESPONSES TO PHQ QUESTIONS 1-9: 0
SUM OF ALL RESPONSES TO PHQ QUESTIONS 1-9: 0
2. FEELING DOWN, DEPRESSED OR HOPELESS: NOT AT ALL
SUM OF ALL RESPONSES TO PHQ QUESTIONS 1-9: 0
SUM OF ALL RESPONSES TO PHQ9 QUESTIONS 1 & 2: 0
SUM OF ALL RESPONSES TO PHQ QUESTIONS 1-9: 0
SUM OF ALL RESPONSES TO PHQ QUESTIONS 1-9: 0
SUM OF ALL RESPONSES TO PHQ9 QUESTIONS 1 & 2: 0
SUM OF ALL RESPONSES TO PHQ QUESTIONS 1-9: 0
1. LITTLE INTEREST OR PLEASURE IN DOING THINGS: NOT AT ALL
1. LITTLE INTEREST OR PLEASURE IN DOING THINGS: NOT AT ALL
SUM OF ALL RESPONSES TO PHQ QUESTIONS 1-9: 0
SUM OF ALL RESPONSES TO PHQ9 QUESTIONS 1 & 2: 0
SUM OF ALL RESPONSES TO PHQ QUESTIONS 1-9: 0
2. FEELING DOWN, DEPRESSED OR HOPELESS: NOT AT ALL

## 2025-01-30 ASSESSMENT — ANXIETY QUESTIONNAIRES
4. TROUBLE RELAXING: NOT AT ALL
7. FEELING AFRAID AS IF SOMETHING AWFUL MIGHT HAPPEN: NOT AT ALL
3. WORRYING TOO MUCH ABOUT DIFFERENT THINGS: NOT AT ALL
GAD7 TOTAL SCORE: 0
5. BEING SO RESTLESS THAT IT IS HARD TO SIT STILL: NOT AT ALL
2. NOT BEING ABLE TO STOP OR CONTROL WORRYING: NOT AT ALL
1. FEELING NERVOUS, ANXIOUS, OR ON EDGE: NOT AT ALL
IF YOU CHECKED OFF ANY PROBLEMS ON THIS QUESTIONNAIRE, HOW DIFFICULT HAVE THESE PROBLEMS MADE IT FOR YOU TO DO YOUR WORK, TAKE CARE OF THINGS AT HOME, OR GET ALONG WITH OTHER PEOPLE: NOT DIFFICULT AT ALL
6. BECOMING EASILY ANNOYED OR IRRITABLE: NOT AT ALL

## 2025-01-30 ASSESSMENT — LIFESTYLE VARIABLES
HOW MANY STANDARD DRINKS CONTAINING ALCOHOL DO YOU HAVE ON A TYPICAL DAY: PATIENT DOES NOT DRINK
HOW OFTEN DO YOU HAVE A DRINK CONTAINING ALCOHOL: NEVER
HOW OFTEN DO YOU HAVE A DRINK CONTAINING ALCOHOL: NEVER
HOW MANY STANDARD DRINKS CONTAINING ALCOHOL DO YOU HAVE ON A TYPICAL DAY: PATIENT DOES NOT DRINK

## 2025-01-30 ASSESSMENT — PAIN - FUNCTIONAL ASSESSMENT: PAIN_FUNCTIONAL_ASSESSMENT: NONE - DENIES PAIN

## 2025-01-30 NOTE — H&P
Hospitalist Admission Note    NAME:   aKtie Wu   : 1950   MRN: [id number]     Date/Time: 2025 2:20 PM    Patient PCP: Henry Amaro MD    ______________________________________________________________________  Given the patient's current clinical presentation, I have a high level of concern for decompensation if discharged from the emergency department.  Complex decision making was performed, which includes reviewing the patient's available past medical records, laboratory results, and x-ray films.       My assessment of this patient's clinical condition and my plan of care is as follows.    Assessment / Plan:      COPD exacerbation  -2-week history of shortness of breath, dyspnea  -Requiring oxygen supplement  -CXR negative for acute pathology or pneumonia  - CTA still pending will follow  - Will start Solu-Medrol IV, will transition to p.o.  - Will add azithromycin  - Will add Xopenex scheduled and then as needed, due to tachycardia  - Continue home medication Trelegy, pulm consult    Atrial flutter  NSTEMI  - On EKG, orthopnea, dyspnea on exertion  - Troponins 97 POA  - Will order an echo, cardiology consult for further evaluation  -Monitor electrolytes, replace as needed  - Will check proBNP, echo in the morning    HTN  - Continue amlodipine    Increased risk for hospital delirium  - Delirium precautions ordered  - Melatonin nightly, thiamine daily     Constipation  - Has not had a bowel movement in a few days, will start bowel regimen as ordered     Medical Decision Making:   I personally reviewed labs: CBC, BMP  I personally reviewed imaging: CXR,  I personally reviewed EKG: Atrial flutter  Toxic drug monitoring: N/A  Discussed case with: ED provider. After discussion I am in agreement that acuity of patient's medical condition necessitates hospital stay.      Code Status: Full code  DVT Prophylaxis: Lovenox  Baseline: Independent    Subjective:   CHIEF COMPLAINT: 2 weeks of shortness of

## 2025-01-30 NOTE — PROGRESS NOTES
Chief Complaint   Patient presents with    Medicare AWV     Patient states \" she is present for a follow-up. Patient states \"she is having SOB.\"    \"Have you been to the ER, urgent care clinic since your last visit?  Hospitalized since your last visit?\"    Yes 3 months ago [facility] cough.    “Have you seen or consulted any other health care providers outside our system since your last visit?”    NO    Have you had a mammogram?”   NO    Date of last Mammogram: 4/8/2022

## 2025-01-30 NOTE — ED NOTES
TRANSFER - OUT REPORT:    Verbal report given to DEON Marshall on Katie Wu  being transferred to [facility] for routine progression of patient care       Report consisted of patient's Situation, Background, Assessment and   Recommendations(SBAR).     Information from the following report(s) Nurse Handoff Report, ED Encounter Summary, ED SBAR, and MAR was reviewed with the receiving nurse.    [place] Fall Assessment:    Presents to emergency department  because of falls (Syncope, seizure, or loss of consciousness): No  Age > 70: Yes  Altered Mental Status, Intoxication with alcohol or substance confusion (Disorientation, impaired judgment, poor safety awaremess, or inability to follow instructions): No  Impaired Mobility: Ambulates or transfers with assistive devices or assistance; Unable to ambulate or transer.: No  Nursing Judgement: Yes          Lines:   Peripheral IV 01/30/25 Right Antecubital (Active)        Opportunity for questions and clarification was provided.      Patient transported with:  Monitor, O2 @ 2lpm, and Tech

## 2025-01-30 NOTE — CONSULTS
[facility]  [address] [place] [address]  [phone]  [email or web address]       CARDIOLOGY CONSULTATION       Date of  Admission: 1/30/2025 11:30 AM     Admission type:Emergency   Primary Care Physician:Henry Amaro MD     Attending Provider: Zaid Miles MD  Cardiology Provider: All    PLEASE NOTE THAT WE CONFIRMED WITH THE REFERRING PHYSICIAN PRIOR TO SEEING THE PATIENT THAT THE PATIENT IS BEING REFERRED FOR INITIAL HOSPITAL EVALUATION AND FOR LONG-TERM ONGOING CARDIAC CARE    CC/REASON FOR CONSULT: elevated troponin, abn EKG      Subjective:     Katie Wu is a 75 y.o. female admitted for Shortness of breath [R06.02]  COPD exacerbation ([facility]) [J44.1]  Atrial fibrillation, unspecified type ([facility]) [I48.91]  Acute hypoxic respiratory failure [J96.01].    Patient is a 75-year-old female admitted for COPD exacerbation.  Past medical history as reported below.  Has been feeling ill for the last 2 weeks seen by primary care today and recommended for admission due to dyspnea.  She denies exertional chest pain.  Endorses dyspnea on exertion.  Compliant with home medication therapy for both hypertension and COPD management.  Former smoker quitting 30+ years ago.  No history of atherosclerotic heart disease or cardiac arrhythmia.  Patient evaluated at bedside multiple family members present no acute distress denies dyspnea or chest pain.  She is speaking in complete sentences, no accessory muscle usage.  Patient Active Problem List    Diagnosis Date Noted    COPD exacerbation (HCC) 01/30/2025    Cataract of both eyes 02/18/2024    Prerenal azotemia 08/21/2022    Chronic renal disease, stage III (HCC) 06/23/2022    Panlobular emphysema (HCC) 02/16/2022    COVID-19 04/08/2020    Lymphadenopathy of head and neck 09/26/2019    Nodular goiter 09/26/2019    G6PD deficiency 09/02/2019    Essential hypertension 02/28/2019    Mild reactive airways disease 01/11/2018  0.9 % sodium chloride infusion   IntraVENous PRN    ondansetron (ZOFRAN-ODT) disintegrating tablet 4 mg  4 mg Oral Q8H PRN    Or    ondansetron (ZOFRAN) injection 4 mg  4 mg IntraVENous Q6H PRN    polyethylene glycol (GLYCOLAX) packet 17 g  17 g Oral Daily PRN    enoxaparin Sodium (LOVENOX) injection 30 mg  30 mg SubCUTAneous Daily    acetaminophen (TYLENOL) tablet 650 mg  650 mg Oral Q6H PRN    Or    acetaminophen (TYLENOL) suppository 650 mg  650 mg Rectal Q6H PRN    azithromycin (ZITHROMAX) 500 mg in sodium chloride 0.9 % 250 mL IVPB ([id number])  500 mg IntraVENous Q24H    methylPREDNISolone sodium succ (SOLU-MEDROL) 40 mg in sterile water 1 mL injection  40 mg IntraVENous Q6H    Followed by    [START ON 2/1/2025] predniSONE (DELTASONE) tablet 40 mg  40 mg Oral Daily    [START ON 1/31/2025] levalbuterol (XOPENEX) nebulization 0.63 mg  0.63 mg Nebulization Q8H PRN    amLODIPine (NORVASC) tablet 10 mg  10 mg Oral Daily    [START ON 1/31/2025] pantoprazole (PROTONIX) tablet 40 mg  40 mg Oral QAM AC    budesonide (PULMICORT) nebulizer suspension 250 mcg  0.25 mg Nebulization BID RT    And    arformoterol tartrate (BROVANA) nebulizer solution 15 mcg  15 mcg Nebulization BID RT    And    ipratropium (ATROVENT) 0.02 % nebulizer solution 0.5 mg  0.5 mg Nebulization TID RT    levalbuterol (XOPENEX) nebulization 0.63 mg  0.63 mg Nebulization TID RT          REVIEW OF SYSTEMS  Review of Systems   Constitutional:  Positive for activity change and fatigue. Negative for appetite change and chills.   Respiratory:  Positive for cough, shortness of breath and wheezing. Negative for chest tightness and stridor.    Cardiovascular:  Negative for chest pain, palpitations and leg swelling.   Gastrointestinal:  Negative for abdominal distention.   Neurological:  Negative for tremors and weakness.            Objective:      Vitals:    01/30/25 1526   BP: 137/86   Pulse: 97   Resp: 16   Temp: 98.2 °F (36.8 °C)   SpO2: 94%       Physical

## 2025-01-30 NOTE — ED PROVIDER NOTES
[facility] EMERGENCY DEPARTMENT  EMERGENCY DEPARTMENT ENCOUNTER       Pt Name: Katie Wu  MRN: [id number]  Birthdate 1950  Date of evaluation: 1/30/2025  Provider: Harrison Jackson MD   PCP: Henry Amaro MD  Note Started: 1:14 PM EST 1/30/25     CHIEF COMPLAINT       Chief Complaint   Patient presents with    Shortness of Breath     Patient ambulatory to triage complaining of shortness of breath x2 weeks. Patient does have a history of COPD and asthma, denies O2 use at baseline. Patient denies chest pain.         HISTORY OF PRESENT ILLNESS: 1 or more elements      History From: Patient  HPI Limitations: None     Katie Wu is a 75 y.o. female who presents  with shortness of breath that started approximately 2 weeks ago. The patient denies any chest pain, fever, or leg swelling. The patient reports coughing up sputum. The patient denies smoking and is not on home oxygen therapy. The patient's current medications include Trelegy.  She reports her difficulty breathing has become worse and has been unable to complete activities at home secondary to her dyspnea.  No known history of DVT or PE.  Denies fever or chills.  Denies rash.  No abdominal pain.      Medical History  - Severe asthma (patient-reported, though later contradicted)    Current and Past Medications and Supplements  - Trelegy  - Blood pressure medication (unspecified)     Nursing Notes were all reviewed and agreed with or any disagreements were addressed in the HPI.     REVIEW OF SYSTEMS      Review of Systems     Positives and Pertinent negatives as per HPI.    PAST HISTORY     Past Medical History:  Past Medical History:   Diagnosis Date    COPD (chronic obstructive pulmonary disease) (HCC)     Emphysema of lung (HCC)     Hypertension About  5 yrs.ago         Past Surgical History:  Past Surgical History:   Procedure Laterality Date    BREAST BIOPSY Right     Benign. Patient thinks biopsy was on rt breast lower areolar area. Along

## 2025-01-30 NOTE — PROGRESS NOTES
End of Shift Note    Bedside shift change report given to DEON Mcghee (oncoming nurse) by Leila Alvares LPN (offgoing nurse).  Report included the following information SBAR, MAR, and Cardiac Rhythm NSR    Shift worked:  7a - 7p     Shift summary and any significant changes:     Patient has no complaints of pain. Charge RN gokul labs and gave medications due. Visitors at beside.      Concerns for physician to address:  None     Zone phone for oncoming shift:   [phone]       Activity:     Number times ambulated in hallways past shift: 0  Number of times OOB to chair past shift: 0    Cardiac:   Cardiac Monitoring: Yes      Cardiac Rhythm: Sinus rhythm    Access:  Current line(s): PIV     Genitourinary:        Respiratory:   O2 Device: Nasal cannula  Chronic home O2 use?: NO  Incentive spirometer at bedside: NO    GI:  Last BM (including prior to admit): 01/29/25  Current diet:  ADULT DIET; Regular  Passing flatus: YES    Pain Management:   Patient states pain is manageable on current regimen: YES    Skin:     Interventions:      Patient Safety:  Fall Risk: Nursing Judgement-Fall Risk High(Add Comments): Yes  Fall Risk Interventions  Nursing Judgement-Fall Risk High(Add Comments): Yes  Toilet Every 2 Hours-In Advance of Need: Yes  Hourly Visual Checks: Awake, In bed  Fall Visual Posted: Socks, Fall sign posted  Room Door Open: Deferred for droplet isolation  Alarm On: Bed  Patient Moved Closer to Nursing Station: No    Active Consults:   IP CONSULT TO CARDIOLOGY  IP CONSULT TO PULMONOLOGY    Length of Stay:  Expected LOS: 3  Actual LOS: 0    Leila Alvares LPN

## 2025-01-30 NOTE — CONSULTS
Consult received. Full consult note to follow.      Callie Thacker MD  Pulmonology  [place], [place]  [phone]  1/30/2025

## 2025-01-30 NOTE — ED TRIAGE NOTES
Patient ambulatory to triage complaining of shortness of breath x2 weeks. Patient does have a history of COPD and asthma, denies O2 use at baseline. Patient denies chest pain.

## 2025-01-30 NOTE — CONSULTS
Pulmonary Consult Note            Name: Katie Wu   : 1950   MRN: [id number]   Date: 2025      Reason for Consult: COPD    Requesting Provider: Dr. Zaid Miles    Mode of visit - In person       HPI:     Katie Wu is 75 y.o. lady with history of COPD with emphysema, history of respiratory failure with COVID requiring high flow O2 in , CKD, hypertension who was admitted here earlier today with worsening shortness of breath and increased cough.    Patient reports chronic exertional dyspnea at baseline.  States she gets short of breath on walking short distances within her house.  Admits chronic dry cough.  No chest congestion or wheezing.  Using Trelegy 100 daily, this is started by PCP, never saw pulmonology or had PFTs.  History of pneumonia in 3/2024, treated as outpatient.  Patient is a remote active smoker but states she has been exposed to passive smoking most of her life.    Cardiology saw her here for troponin elevation, felt it from demand ischemia and that she is in sinus rhythm with PACs, and recommending echo.    Patient is afebrile and hypertensive.  Oxygen sats in the ED were in 80s, she is on satting in mid 90s on 2 L now.    Labs show mild anemia, mildly elevated D-dimer and mildly elevated troponin.   COVID and flu PCR negative    Current chest CT images shows diffuse pulmonary emphysema, small left basilar infiltrate, new from 3/2024, and no pulmonary emboli.  Of note, prior medial left lung infiltrate has resolved.    Echocardiogram in 2017 showed normal LVEF and RV function    Assessment/Plan:     1.  Acute exacerbation of COPD  2.  Small left basilar infiltrate on reviewing chest CT images, suspect pneumonia   3.  Marked pulmonary emphysema onset imaging  4.  Mildly elevated troponin    Management plan :    -Mild hypoxia, wean oxygen as tolerated  -Suspect mild pneumonia without sepsis, add Rocephin  -Continue azithromycin and prednisone  -Obtain sputum cultures,  disintegrating tablet 4 mg  4 mg Oral Q8H PRN Zaid Miles MD        Or    ondansetron (ZOFRAN) injection 4 mg  4 mg IntraVENous Q6H PRN Zaid Miles MD        polyethylene glycol (GLYCOLAX) packet 17 g  17 g Oral Daily PRN Zaid Miles MD        enoxaparin Sodium (LOVENOX) injection 30 mg  30 mg SubCUTAneous Daily Zaid Miles MD   30 mg at 01/30/25 1636    acetaminophen (TYLENOL) tablet 650 mg  650 mg Oral Q6H PRN Zaid Miles MD        Or    acetaminophen (TYLENOL) suppository 650 mg  650 mg Rectal Q6H PRN Zaid Miles MD        azithromycin (ZITHROMAX) 500 mg in sodium chloride 0.9 % 250 mL IVPB ([id number])  500 mg IntraVENous Q24H Zaid Miles  mL/hr at 01/30/25 1655 500 mg at 01/30/25 1655    methylPREDNISolone sodium succ (SOLU-MEDROL) 40 mg in sterile water 1 mL injection  40 mg IntraVENous Q6H Zaid Miles MD   40 mg at 01/30/25 1636    Followed by    [START ON 2/1/2025] predniSONE (DELTASONE) tablet 40 mg  40 mg Oral Daily Zaid Miles MD        [START ON 1/31/2025] levalbuterol (XOPENEX) nebulization 0.63 mg  0.63 mg Nebulization Q8H PRN Zaid Miles MD        amLODIPine (NORVASC) tablet 10 mg  10 mg Oral Daily Zaid Miles MD   10 mg at 01/30/25 1636    [START ON 1/31/2025] pantoprazole (PROTONIX) tablet 40 mg  40 mg Oral QAM AC Zaid Miles MD        budesonide (PULMICORT) nebulizer suspension 250 mcg  0.25 mg Nebulization BID RT Zaid Miles MD        And    arformoterol tartrate (BROVANA) nebulizer solution 15 mcg  15 mcg Nebulization BID RT Zaid Miles MD        And    ipratropium (ATROVENT) 0.02 % nebulizer solution 0.5 mg  0.5 mg Nebulization TID RT Zaid Miles MD        levalbuterol (XOPENEX) nebulization 0.63 mg  0.63 mg Nebulization TID RT Zaid Miles MD           Home Medications:     Prior to Admission medications    Medication Sig Start Date End Date Taking? Authorizing Provider   amLODIPine (NORVASC) 10 MG tablet TAKE 1 TABLET EVERY DAY

## 2025-01-31 ENCOUNTER — APPOINTMENT (OUTPATIENT)
Facility: HOSPITAL | Age: 75
DRG: 190 | End: 2025-01-31
Attending: STUDENT IN AN ORGANIZED HEALTH CARE EDUCATION/TRAINING PROGRAM
Payer: MEDICARE

## 2025-01-31 LAB
CRP SERPL-MCNC: 1.27 MG/DL (ref 0–0.3)
ECHO BSA: 1.48 M2
ECHO LA DIAMETER INDEX: 2.42 CM/M2
ECHO LA DIAMETER: 3.6 CM
ECHO LV EF PHYSICIAN: 60 %
ECHO LV FRACTIONAL SHORTENING: 28 % (ref 28–44)
ECHO LV INTERNAL DIMENSION DIASTOLE INDEX: 2.89 CM/M2
ECHO LV INTERNAL DIMENSION DIASTOLIC: 4.3 CM (ref 3.9–5.3)
ECHO LV INTERNAL DIMENSION SYSTOLIC INDEX: 2.08 CM/M2
ECHO LV INTERNAL DIMENSION SYSTOLIC: 3.1 CM
ECHO LV IVSD: 1 CM (ref 0.6–0.9)
ECHO LV MASS 2D: 152.6 G (ref 67–162)
ECHO LV MASS INDEX 2D: 102.4 G/M2 (ref 43–95)
ECHO LV POSTERIOR WALL DIASTOLIC: 1.1 CM (ref 0.6–0.9)
ECHO LV RELATIVE WALL THICKNESS RATIO: 0.51
ECHO LVOT AREA: 3.1 CM2
ECHO LVOT DIAM: 2 CM
ECHO MV MAX VELOCITY: 1.3 M/S
ECHO MV MEAN GRADIENT: 2 MMHG
ECHO MV MEAN VELOCITY: 0.7 M/S
ECHO MV PEAK GRADIENT: 6 MMHG
ECHO MV REGURGITANT PEAK GRADIENT: 104 MMHG
ECHO MV REGURGITANT PEAK VELOCITY: 5.1 M/S
ECHO MV VTI: 39 CM
ECHO RV INTERNAL DIMENSION: 3.2 CM
EKG ATRIAL RATE: 326 BPM
EKG DIAGNOSIS: NORMAL
EKG P AXIS: 85 DEGREES
EKG Q-T INTERVAL: 310 MS
EKG QRS DURATION: 68 MS
EKG QTC CALCULATION (BAZETT): 370 MS
EKG R AXIS: 94 DEGREES
EKG T AXIS: 32 DEGREES
EKG VENTRICULAR RATE: 86 BPM
PROCALCITONIN SERPL-MCNC: <0.05 NG/ML

## 2025-01-31 PROCEDURE — 87899 AGENT NOS ASSAY W/OPTIC: CPT

## 2025-01-31 PROCEDURE — 6360000002 HC RX W HCPCS: Performed by: STUDENT IN AN ORGANIZED HEALTH CARE EDUCATION/TRAINING PROGRAM

## 2025-01-31 PROCEDURE — 87070 CULTURE OTHR SPECIMN AEROBIC: CPT

## 2025-01-31 PROCEDURE — 87205 SMEAR GRAM STAIN: CPT

## 2025-01-31 PROCEDURE — 6370000000 HC RX 637 (ALT 250 FOR IP): Performed by: STUDENT IN AN ORGANIZED HEALTH CARE EDUCATION/TRAINING PROGRAM

## 2025-01-31 PROCEDURE — 94761 N-INVAS EAR/PLS OXIMETRY MLT: CPT

## 2025-01-31 PROCEDURE — 36415 COLL VENOUS BLD VENIPUNCTURE: CPT

## 2025-01-31 PROCEDURE — 87449 NOS EACH ORGANISM AG IA: CPT

## 2025-01-31 PROCEDURE — 94640 AIRWAY INHALATION TREATMENT: CPT

## 2025-01-31 PROCEDURE — 93005 ELECTROCARDIOGRAM TRACING: CPT | Performed by: NURSE PRACTITIONER

## 2025-01-31 PROCEDURE — 86140 C-REACTIVE PROTEIN: CPT

## 2025-01-31 PROCEDURE — 84145 PROCALCITONIN (PCT): CPT

## 2025-01-31 PROCEDURE — 6370000000 HC RX 637 (ALT 250 FOR IP): Performed by: INTERNAL MEDICINE

## 2025-01-31 PROCEDURE — 2500000003 HC RX 250 WO HCPCS: Performed by: STUDENT IN AN ORGANIZED HEALTH CARE EDUCATION/TRAINING PROGRAM

## 2025-01-31 PROCEDURE — 93321 DOPPLER ECHO F-UP/LMTD STD: CPT

## 2025-01-31 PROCEDURE — 2700000000 HC OXYGEN THERAPY PER DAY

## 2025-01-31 PROCEDURE — 1100000000 HC RM PRIVATE

## 2025-01-31 RX ORDER — AZITHROMYCIN 250 MG/1
500 TABLET, FILM COATED ORAL EVERY 24 HOURS
Status: COMPLETED | OUTPATIENT
Start: 2025-01-31 | End: 2025-02-01

## 2025-01-31 RX ADMIN — AMOXICILLIN AND CLAVULANATE POTASSIUM 1 TABLET: 875; 125 TABLET, FILM COATED ORAL at 08:42

## 2025-01-31 RX ADMIN — AZITHROMYCIN 500 MG: 250 TABLET, FILM COATED ORAL at 16:45

## 2025-01-31 RX ADMIN — IPRATROPIUM BROMIDE 0.5 MG: 0.5 SOLUTION RESPIRATORY (INHALATION) at 21:46

## 2025-01-31 RX ADMIN — WATER 40 MG: 1 INJECTION INTRAMUSCULAR; INTRAVENOUS; SUBCUTANEOUS at 08:42

## 2025-01-31 RX ADMIN — AMLODIPINE BESYLATE 10 MG: 5 TABLET ORAL at 08:42

## 2025-01-31 RX ADMIN — ARFORMOTEROL TARTRATE 15 MCG: 15 SOLUTION RESPIRATORY (INHALATION) at 21:46

## 2025-01-31 RX ADMIN — WATER 40 MG: 1 INJECTION INTRAMUSCULAR; INTRAVENOUS; SUBCUTANEOUS at 20:57

## 2025-01-31 RX ADMIN — WATER 40 MG: 1 INJECTION INTRAMUSCULAR; INTRAVENOUS; SUBCUTANEOUS at 01:45

## 2025-01-31 RX ADMIN — PANTOPRAZOLE SODIUM 40 MG: 40 TABLET, DELAYED RELEASE ORAL at 05:49

## 2025-01-31 RX ADMIN — ARFORMOTEROL TARTRATE 15 MCG: 15 SOLUTION RESPIRATORY (INHALATION) at 08:20

## 2025-01-31 RX ADMIN — AMOXICILLIN AND CLAVULANATE POTASSIUM 1 TABLET: 875; 125 TABLET, FILM COATED ORAL at 20:18

## 2025-01-31 RX ADMIN — WATER 40 MG: 1 INJECTION INTRAMUSCULAR; INTRAVENOUS; SUBCUTANEOUS at 16:46

## 2025-01-31 RX ADMIN — SODIUM CHLORIDE, PRESERVATIVE FREE 10 ML: 5 INJECTION INTRAVENOUS at 20:19

## 2025-01-31 RX ADMIN — ENOXAPARIN SODIUM 30 MG: 100 INJECTION SUBCUTANEOUS at 08:41

## 2025-01-31 RX ADMIN — LEVALBUTEROL HYDROCHLORIDE 0.63 MG: 0.63 SOLUTION RESPIRATORY (INHALATION) at 08:20

## 2025-01-31 RX ADMIN — IPRATROPIUM BROMIDE 0.5 MG: 0.5 SOLUTION RESPIRATORY (INHALATION) at 08:20

## 2025-01-31 RX ADMIN — BUDESONIDE 250 MCG: 0.25 INHALANT RESPIRATORY (INHALATION) at 08:20

## 2025-01-31 RX ADMIN — SODIUM CHLORIDE, PRESERVATIVE FREE 10 ML: 5 INJECTION INTRAVENOUS at 08:41

## 2025-01-31 RX ADMIN — BUDESONIDE 250 MCG: 0.25 INHALANT RESPIRATORY (INHALATION) at 21:46

## 2025-01-31 NOTE — PROGRESS NOTES
Hospitalist Progress Note    NAME:   Katie Wu   : 1950   MRN: [id number]     Date/Time: 2025 1:36 PM  Patient PCP: Henry Amaro MD    Estimated discharge date:  Barriers:       Assessment / Plan:  Acute exacerbation of COPD  Small left basilar infiltrate on CT-?  Pneumonia  Moderate pulmonary emphysema    COVID and flu negative  Legionella antigen pending  Respiratory culture pending  Procalcitonin <0.05    CRP 1.27    Plan  -Continue with oxygen supplementation as needed, wean as tolerated  -Continue with antibiotics: Augmentin and azithromycin  -Continue with Solu-Medrol followed by tapering prednisone  -Continue with albuterol nebulization   -continue with the substitute while admitted and resume Trulicity 100 Mg daily at discharge  -Pulmonology evaluated and recommended PFT as outpatient after acute flareup resolves      Abnormal EKG  Elevated troponin-likely demand ischemia in setting of COPD/hypoxemia  Atrial flutter POA EKG  Sinus rhythm with multiple PVC on repeat EKG  Echo: Normal left ventricular systolic function with EF 55 to 60%, mild MR, mild TR, left atrium dilated  Evaluated by cardiology: No further recommendation at present.  Signed off      Hypertension  Continue with amlodipine    High risk of hospital-acquired delirium  Continue with delirium precaution  Melatonin nightly, thiamine daily      Medical Decision Making:   I personally reviewed labs: Yes  I personally reviewed imaging: Yes  I personally reviewed EKG: Yes  Toxic drug monitoring: Azithromycin-QTc  Discussed case with: Patient, RN        Code Status: Full code  DVT Prophylaxis:   GI Prophylaxis:    Subjective:     Chief Complaint / Reason for Physician Visit  \" States breathing is much better.  She was out of the bed-denies any shortness of breath on mobilization\".  Discussed with RN events overnight.       Objective:     VITALS:   Last 24hrs VS reviewed since prior progress note. Most recent

## 2025-01-31 NOTE — PROGRESS NOTES
End of Shift Note    Bedside shift change report given to DEON Bass (oncoming nurse) by Leila Alvares LPN (offgoing nurse).  Report included the following information SBAR, MAR, and Cardiac Rhythm SR    Shift worked:  7a - 7p     Shift summary and any significant changes:     Patient resting on 1 L NC. No complaints of pain. No bowel movement.      Concerns for physician to address:  None     Zone phone for oncoming shift:   [phone]       Activity:  Level of Assistance: Independent  Number times ambulated in hallways past shift: 0  Number of times OOB to chair past shift: 1    Cardiac:   Cardiac Monitoring: Yes      Cardiac Rhythm: Sinus rhythm    Access:  Current line(s): PIV     Genitourinary:   Urinary Status: Voiding, Bathroom privileges    Respiratory:   O2 Device: Nasal cannula  Chronic home O2 use?: NO  Incentive spirometer at bedside: NO    GI:  Last BM (including prior to admit): 01/30/25  Current diet:  ADULT DIET; Regular  Passing flatus: YES    Pain Management:   Patient states pain is manageable on current regimen: YES    Skin:  Ventura Scale Score: 23  Interventions:      Patient Safety:  Fall Risk: Nursing Judgement-Fall Risk High(Add Comments): No  Fall Risk Interventions  Nursing Judgement-Fall Risk High(Add Comments): No  Toilet Every 2 Hours-In Advance of Need: Yes  Hourly Visual Checks: In bed, Awake  Fall Visual Posted: Socks  Room Door Open: Deferred to promote rest  Alarm On: Other (Comment) (ad royce)  Patient Moved Closer to Nursing Station: No    Active Consults:   IP CONSULT TO CARDIOLOGY  IP CONSULT TO PULMONOLOGY    Length of Stay:  Expected LOS: 4  Actual LOS: 1    Leila Alvares LPN

## 2025-01-31 NOTE — PROGRESS NOTES
[facility]  [address]   [address]  [facility] [address]  Phone:  [phone]  Fax: [phone]       Chief Complaint   Patient presents with    Medicare AWV   .      SUBJECTIVE:      History of Present Illness  The patient presents for evaluation of shortness of breath. She is accompanied by her granddaughter.    She reports experiencing severe dyspnea, which was particularly pronounced yesterday morning. She also mentions a mild cough. Despite using Trelegy once daily, she continues to experience significant shortness of breath. Her symptoms have been progressively worsening over the past 2 weeks, coinciding with the onset of a cold. She did not experience such severe dyspnea in November 2024. She is still working. She finds it challenging to work due to her respiratory distress, particularly in the mornings. She describes feeling overwhelmed and exhausted after simple tasks such as getting into her car. She has a history of smoking for a brief period and was exposed to secondhand smoke from her . She also reports urinary urgency, necessitating immediate access to a bathroom. She experiences wheezing when she has a cold. Her granddaughter observed that she was not short of breath this morning while walking from the house to the car.    Supplemental Information  She had cataract surgery and cornea surgery.    SOCIAL HISTORY  The patient has a history of smoking for a brief period and was exposed to secondhand smoke from her .    MEDICATIONS  Trelegy         No current facility-administered medications for this visit.     No current outpatient medications on file.     Facility-Administered Medications Ordered in Other Visits   Medication Dose Route Frequency Provider Last Rate Last Admin    sodium chloride flush 0.9 % injection 5-40 mL  5-40 mL IntraVENous 2 times per day Zaid Miles MD   10 mL at 01/30/25 2041    sodium chloride flush 0.9 % injection 5-40 mL

## 2025-01-31 NOTE — PROGRESS NOTES
[facility]  [address] [place] [address]  [phone]  [email or web address]         Cardiology Progress Note      1/31/2025 11:10 AM    Admit Date: 1/30/2025    Admit Diagnosis:   Shortness of breath [R06.02]  COPD exacerbation (HCC) [J44.1]  Atrial fibrillation, unspecified type (HCC) [I48.91]  Acute hypoxic respiratory failure [J96.01]    Interval History/Subjective:     Katie Wu denies complaints.  Feeling better this morning.    /80   Pulse 74   Temp 98.2 °F (36.8 °C) (Oral)   Resp 18   Ht 1.6 m (5' 3\")   Wt 49 kg (108 lb)   SpO2 97%   BMI 19.13 kg/m²     Current Facility-Administered Medications   Medication Dose Route Frequency    azithromycin (ZITHROMAX) tablet 500 mg  500 mg Oral Q24H    sodium chloride flush 0.9 % injection 5-40 mL  5-40 mL IntraVENous 2 times per day    sodium chloride flush 0.9 % injection 5-40 mL  5-40 mL IntraVENous PRN    0.9 % sodium chloride infusion   IntraVENous PRN    ondansetron (ZOFRAN-ODT) disintegrating tablet 4 mg  4 mg Oral Q8H PRN    Or    ondansetron (ZOFRAN) injection 4 mg  4 mg IntraVENous Q6H PRN    polyethylene glycol (GLYCOLAX) packet 17 g  17 g Oral Daily PRN    enoxaparin Sodium (LOVENOX) injection 30 mg  30 mg SubCUTAneous Daily    acetaminophen (TYLENOL) tablet 650 mg  650 mg Oral Q6H PRN    Or    acetaminophen (TYLENOL) suppository 650 mg  650 mg Rectal Q6H PRN    methylPREDNISolone sodium succ (SOLU-MEDROL) 40 mg in sterile water 1 mL injection  40 mg IntraVENous Q6H    Followed by    [START ON 2/1/2025] predniSONE (DELTASONE) tablet 40 mg  40 mg Oral Daily    levalbuterol (XOPENEX) nebulization 0.63 mg  0.63 mg Nebulization Q8H PRN    amLODIPine (NORVASC) tablet 10 mg  10 mg Oral Daily    pantoprazole (PROTONIX) tablet 40 mg  40 mg Oral QAM AC    budesonide (PULMICORT) nebulizer suspension 250 mcg  0.25 mg Nebulization BID RT    And    arformoterol tartrate (BROVANA) nebulizer solution 15  (TTE) limited (PRN contrast/bubble/strain/3D)    Left Ventricle: Normal left ventricular systolic function with a   visually estimated EF of 55 - 60%.    Mitral Valve: Mild regurgitation.    Tricuspid Valve: Mild regurgitation.    Left Atrium: Left atrium is dilated.    Image quality is good.      Assessment:     Principal Problem:    COPD exacerbation (HCC)  Resolved Problems:    * No resolved hospital problems. *      Plan:     Elevated troponin  Flat, downtrending  In the setting of COPD/hypoxemia  Echo with normal LVEF  No further evaluation needed    2.  Abnormal EKG  Sinus with PACs.  No AF/AFL noted on telemetry    No further recommendations from cardiac standpoint.  Will sign off.  Please call us again if any questions      SHANDRA Swan - MARGARETH

## 2025-01-31 NOTE — PROGRESS NOTES
End of Shift Note    Bedside shift change report given to FRANKLIN Dee (oncoming nurse) by Taz Graham RN (offgoing nurse).  Report included the following information SBAR, Kardex, Intake/Output, MAR, Recent Results, Cardiac Rhythm Sinus Rhythm, and Quality Measures    Shift worked:  [id number]     Shift summary and any significant changes:     Pt had a calm night. No complaints of pain. On 1L of O2 via nasal cannula. Plan of care ongoing.     No other concerns at this time.      Concerns for physician to address:       Zone phone for oncoming shift:   [phone]       Activity:  Level of Assistance: Independent  Number times ambulated in hallways past shift: 3  Number of times OOB to chair past shift: 3    Cardiac:   Cardiac Monitoring: Yes      Cardiac Rhythm: Sinus rhythm    Access:  Current line(s): PIV     Genitourinary:   Urinary Status: Voiding, Bathroom privileges    Respiratory:   O2 Device: Nasal cannula  Chronic home O2 use?: NO  Incentive spirometer at bedside: NO    GI:  Last BM (including prior to admit): 01/29/25  Current diet:  ADULT DIET; Regular  Passing flatus: YES    Pain Management:   Patient states pain is manageable on current regimen: N/A    Skin:  Ventura Scale Score: 20  Interventions:      Patient Safety:  Fall Risk: Nursing Judgement-Fall Risk High(Add Comments): Yes  Fall Risk Interventions  Nursing Judgement-Fall Risk High(Add Comments): Yes  Toilet Every 2 Hours-In Advance of Need: Yes  Hourly Visual Checks: In bed, Awake  Fall Visual Posted: Socks, Fall sign posted  Room Door Open: Deferred to promote rest  Alarm On: Bed  Patient Moved Closer to Nursing Station: No    Active Consults:   IP CONSULT TO CARDIOLOGY  IP CONSULT TO PULMONOLOGY    Length of Stay:  Expected LOS: 3  Actual LOS: 1    Taz Grahma, DEON

## 2025-01-31 NOTE — CARE COORDINATION
Care Management Initial Assessment       RUR: 7% Low Risk  Readmission? No  1st IM letter given? Yes  1st  letter given: N/A    Initial Assessment: Chart reviewed. CM met with pt at the bedside to introduce self and role. Verified contact information and demographics. Pt resides alone in a one level home with no ROSY. Pt PCP is Dr. Amaro with last visit being yesterday. Preferred pharmacy is [facility] on [address]. Pt has no hx needing HH/IPR/SNF services. Pt states she is independent with ADL's and is an active . ?Pts family will transport for discharge.?She?states there are no concerns for her to return home. CM will continue to follow.    [facility] Inpatient Care Management Remote Patient Monitoring Assessment Note    Received referral from [facility] Care Transitions Team to assess patient for consideration to enroll in the [facility] Remote Patient Monitoring program once discharged to home. Remote patient monitoring provides care teams and patients with the tools to act on acute changes in chronic conditions, achieve optimal health status, and improve quality of life.     Date of assessment: 01/31/25    Katie Wu is a 75 y.o. female inpatient at [facility] admitted on  1/30/2025.  Primary Diagnosis  COPD exacerbation (HCC)    Confirmed patient still utilizing [facility] PCP. Yes -   Condition(s) that qualify this patient for RPM: COPD.    RPM Status: RPM offered to patient:   patient would like to consider and request CM follow up in 24 hours.        Thad uLgo     Plan A: Home with follow up appts.  Plan B: Home with HH  Full assessment below:          01/31/25 1602   Service Assessment   Patient Orientation Alert and Oriented;Person;Place;Situation;Self   Cognition Alert   History Provided By Patient   Primary Caregiver Self   Support Systems Children   Patient's Healthcare Decision Maker is: Legal Next of Kin   PCP Verified by CM Yes   Last Visit to PCP Within last 3  months   Prior Functional Level Independent in ADLs/IADLs   Current Functional Level Independent in ADLs/IADLs   Can patient return to prior living arrangement Yes   Ability to make needs known: Good   Family able to assist with home care needs: Yes   Would you like for me to discuss the discharge plan with any other family members/significant others, and if so, who? No   Financial Resources Medicare   Social/Functional History   Lives With Alone   Type of Home House   Home Layout One level   Home Equipment None   Active  Yes   Discharge Planning   Type of Residence House   Current Services Prior To Admission None   Patient expects to be discharged to: House         Advance Care Planning     General Advance Care Planning (ACP) Conversation    Date of Conversation: 1/31/2025  Conducted with: Patient with Decision Making Capacity  Other persons present: Son      Healthcare Decision Maker:   Primary Decision Maker: Radha Wu - Child - [phone]    Secondary Decision Maker: Polly Armenta - Child - [phone]    Secondary Decision Maker: Sg Wu Jr. - Child  [phone]       Content/Action Overview:  Has NO ACP documents-Information provided  Reviewed DNR/DNI and patient elects Full Code (Attempt Resuscitation)        Length of Voluntary ACP Conversation in minutes:  <16 minutes (Non-Billable)    BRUNA Blackman,  [phone]

## 2025-01-31 NOTE — PATIENT INSTRUCTIONS
Learning About Being Active as an Older Adult  Why is being active important as you get older?     Being active is one of the best things you can do for your health. And it's never too late to start. Being active--or getting active, if you aren't already--has definite benefits. It can:  Give you more energy,  Keep your mind sharp.  Improve balance to reduce your risk of falls.  Help you manage chronic illness with fewer medicines.  No matter how old you are, how fit you are, or what health problems you have, there is a form of activity that will work for you. And the more physical activity you can do, the better your overall health will be.  What kinds of activity can help you stay healthy?  Being more active will make your daily activities easier. Physical activity includes planned exercise and things you do in daily life. There are four types of activity:  Aerobic.  Doing aerobic activity makes your heart and lungs strong.  Includes walking, dancing, and gardening.  Aim for at least 2½ hours spread throughout the week.  It improves your energy and can help you sleep better.  Muscle-strengthening.  This type of activity can help maintain muscle and strengthen bones.  Includes climbing stairs, using resistance bands, and lifting or carrying heavy loads.  Aim for at least twice a week.  It can help protect the knees and other joints.  Stretching.  Stretching gives you better range of motion in joints and muscles.  Includes upper arm stretches, calf stretches, and gentle yoga.  Aim for at least twice a week, preferably after your muscles are warmed up from other activities.  It can help you function better in daily life.  Balancing.  This helps you stay coordinated and have good posture.  Includes heel-to-toe walking, marshal chi, and certain types of yoga.  Aim for at least 3 days a week.  It can reduce your risk of falling.  Even if you have a hard time meeting the recommendations, it's better to be more active

## 2025-01-31 NOTE — PROGRESS NOTES
Pulmonary Progress Note    Patient: Katie Wu                     YOB: 1950        Date- 1/31/2025                           Admit Date: 1/30/2025       CC: Follow up for COPD exacerbation    IMPRESSION & PLAN:     1.  Acute exacerbation of COPD  2.  Small left basilar infiltrate on reviewing chest CT images, suspect pneumonia   3.  Marked pulmonary emphysema onset imaging  4.  Mildly elevated troponin     Management plan :     -Mild hypoxia, wean oxygen as tolerated  -Continue current antibiotics for now and follow-up on pneumonia workup.Could come off Azithro if this is non revealing.  -Continue prednisone course  -Continue albuterol nebulizations  -Continue the substitutes while she is here and resume Trelegy 100 daily at discharge  -Never had PFTs, recommend office PFTs after acute flareup resolves     Spoke with her son Sg at bedside and reviewed the plan.     We will see her again on Monday if she is still here, please call us if needed over the weekend.  We will offer follow-up at [facility].    Total time on encounter > 35 min       Subjective:    Interval History:    Breathing better.  On nasal oxygen.  Remains afebrile.  Procalcitonin negative and CRP minimally elevated.    Sputum results and urinary pneumonia antigens pending.  Echo results pending.      Medications:  Current Facility-Administered Medications   Medication Dose Route Frequency Provider Last Rate Last Admin    azithromycin (ZITHROMAX) tablet 500 mg  500 mg Oral Q24H Mary Clement MD        sodium chloride flush 0.9 % injection 5-40 mL  5-40 mL IntraVENous 2 times per day Zaid Miles MD   10 mL at 01/31/25 0841    sodium chloride flush 0.9 % injection 5-40 mL  5-40 mL IntraVENous PRN Zaid Miles MD        0.9 % sodium chloride infusion   IntraVENous PRN Zaid Miles MD        ondansetron (ZOFRAN-ODT) disintegrating tablet 4 mg  4 mg Oral Q8H PRN Zaid Miles MD        Or    ondansetron  (ZOFRAN) injection 4 mg  4 mg IntraVENous Q6H PRN Zaid Miles MD        polyethylene glycol (GLYCOLAX) packet 17 g  17 g Oral Daily PRN Zaid Miles MD        enoxaparin Sodium (LOVENOX) injection 30 mg  30 mg SubCUTAneous Daily Zaid Miles MD   30 mg at 01/31/25 0841    acetaminophen (TYLENOL) tablet 650 mg  650 mg Oral Q6H PRN Zaid Miles MD        Or    acetaminophen (TYLENOL) suppository 650 mg  650 mg Rectal Q6H PRN Zaid Miles MD        methylPREDNISolone sodium succ (SOLU-MEDROL) 40 mg in sterile water 1 mL injection  40 mg IntraVENous Q6H Zaid Miles MD   40 mg at 01/31/25 0842    Followed by    [START ON 2/1/2025] predniSONE (DELTASONE) tablet 40 mg  40 mg Oral Daily Zaid Miles MD        levalbuterol (XOPENEX) nebulization 0.63 mg  0.63 mg Nebulization Q8H PRN Zaid Miles MD        amLODIPine (NORVASC) tablet 10 mg  10 mg Oral Daily Zaid Miles MD   10 mg at 01/31/25 0842    pantoprazole (PROTONIX) tablet 40 mg  40 mg Oral QAM AC Zaid Miles MD   40 mg at 01/31/25 0549    budesonide (PULMICORT) nebulizer suspension 250 mcg  0.25 mg Nebulization BID RT Zaid Miles MD   250 mcg at 01/31/25 0820    And    arformoterol tartrate (BROVANA) nebulizer solution 15 mcg  15 mcg Nebulization BID RT Zaid Miles MD   15 mcg at 01/31/25 0820    And    ipratropium (ATROVENT) 0.02 % nebulizer solution 0.5 mg  0.5 mg Nebulization TID RT Zaid Miles MD   0.5 mg at 01/31/25 0820    levalbuterol (XOPENEX) nebulization 0.63 mg  0.63 mg Nebulization TID RT Zaid Miles MD   0.63 mg at 01/31/25 0820    amoxicillin-clavulanate (AUGMENTIN) 875-125 MG per tablet 1 tablet  1 tablet Oral 2 times per day Callie Thacker MD   1 tablet at 01/31/25 0842       I/O's:  No intake or output data in the 24 hours ending 01/31/25 1039           Vital Signs:  /80   Pulse 74   Temp 98.2 °F (36.8 °C) (Oral)   Resp 18   Ht 1.6 m (5' 3\")   Wt 49 kg (108 lb)   SpO2 97%   BMI

## 2025-01-31 NOTE — PLAN OF CARE
Problem: Respiratory - Adult  Goal: Achieves optimal ventilation and oxygenation  1/30/2025 2041 by Nay Youngblood RCP  Outcome: Progressing  1/30/2025 1938 by Taz Graham RN  Outcome: Progressing

## 2025-01-31 NOTE — PLAN OF CARE
Problem: Discharge Planning  Goal: Discharge to home or other facility with appropriate resources  Outcome: Progressing  Flowsheets (Taken 1/30/2025 1916)  Discharge to home or other facility with appropriate resources:   Identify barriers to discharge with patient and caregiver   Arrange for needed discharge resources and transportation as appropriate   Identify discharge learning needs (meds, wound care, etc)   Arrange for interpreters to assist at discharge as needed   Refer to discharge planning if patient needs post-hospital services based on physician order or complex needs related to functional status, cognitive ability or social support system     Problem: Safety - Adult  Goal: Free from fall injury  Outcome: Progressing     Problem: Respiratory - Adult  Goal: Achieves optimal ventilation and oxygenation  Outcome: Progressing

## 2025-02-01 PROCEDURE — 6370000000 HC RX 637 (ALT 250 FOR IP): Performed by: STUDENT IN AN ORGANIZED HEALTH CARE EDUCATION/TRAINING PROGRAM

## 2025-02-01 PROCEDURE — 2700000000 HC OXYGEN THERAPY PER DAY

## 2025-02-01 PROCEDURE — 2500000003 HC RX 250 WO HCPCS: Performed by: STUDENT IN AN ORGANIZED HEALTH CARE EDUCATION/TRAINING PROGRAM

## 2025-02-01 PROCEDURE — 94640 AIRWAY INHALATION TREATMENT: CPT

## 2025-02-01 PROCEDURE — 6360000002 HC RX W HCPCS: Performed by: STUDENT IN AN ORGANIZED HEALTH CARE EDUCATION/TRAINING PROGRAM

## 2025-02-01 PROCEDURE — 94761 N-INVAS EAR/PLS OXIMETRY MLT: CPT

## 2025-02-01 PROCEDURE — 6370000000 HC RX 637 (ALT 250 FOR IP): Performed by: INTERNAL MEDICINE

## 2025-02-01 PROCEDURE — 1100000000 HC RM PRIVATE

## 2025-02-01 RX ORDER — BUDESONIDE 0.25 MG/2ML
0.25 INHALANT ORAL
Status: DISCONTINUED | OUTPATIENT
Start: 2025-02-02 | End: 2025-02-02 | Stop reason: HOSPADM

## 2025-02-01 RX ORDER — ARFORMOTEROL TARTRATE 15 UG/2ML
15 SOLUTION RESPIRATORY (INHALATION)
Status: DISCONTINUED | OUTPATIENT
Start: 2025-02-02 | End: 2025-02-02 | Stop reason: HOSPADM

## 2025-02-01 RX ADMIN — ENOXAPARIN SODIUM 30 MG: 100 INJECTION SUBCUTANEOUS at 10:55

## 2025-02-01 RX ADMIN — IPRATROPIUM BROMIDE 0.5 MG: 0.5 SOLUTION RESPIRATORY (INHALATION) at 08:20

## 2025-02-01 RX ADMIN — AZITHROMYCIN 500 MG: 250 TABLET, FILM COATED ORAL at 14:58

## 2025-02-01 RX ADMIN — AMOXICILLIN AND CLAVULANATE POTASSIUM 1 TABLET: 875; 125 TABLET, FILM COATED ORAL at 20:58

## 2025-02-01 RX ADMIN — ARFORMOTEROL TARTRATE 15 MCG: 15 SOLUTION RESPIRATORY (INHALATION) at 08:15

## 2025-02-01 RX ADMIN — AMOXICILLIN AND CLAVULANATE POTASSIUM 1 TABLET: 875; 125 TABLET, FILM COATED ORAL at 10:55

## 2025-02-01 RX ADMIN — BUDESONIDE 250 MCG: 0.25 INHALANT RESPIRATORY (INHALATION) at 22:00

## 2025-02-01 RX ADMIN — SODIUM CHLORIDE, PRESERVATIVE FREE 10 ML: 5 INJECTION INTRAVENOUS at 10:59

## 2025-02-01 RX ADMIN — PREDNISONE 40 MG: 20 TABLET ORAL at 14:58

## 2025-02-01 RX ADMIN — ACETAMINOPHEN 650 MG: 325 TABLET ORAL at 04:59

## 2025-02-01 RX ADMIN — IPRATROPIUM BROMIDE 0.5 MG: 0.5 SOLUTION RESPIRATORY (INHALATION) at 22:00

## 2025-02-01 RX ADMIN — WATER 40 MG: 1 INJECTION INTRAMUSCULAR; INTRAVENOUS; SUBCUTANEOUS at 10:56

## 2025-02-01 RX ADMIN — ARFORMOTEROL TARTRATE 15 MCG: 15 SOLUTION RESPIRATORY (INHALATION) at 22:00

## 2025-02-01 RX ADMIN — AMLODIPINE BESYLATE 10 MG: 5 TABLET ORAL at 10:55

## 2025-02-01 RX ADMIN — BUDESONIDE 250 MCG: 0.25 INHALANT RESPIRATORY (INHALATION) at 08:15

## 2025-02-01 RX ADMIN — PANTOPRAZOLE SODIUM 40 MG: 40 TABLET, DELAYED RELEASE ORAL at 05:22

## 2025-02-01 RX ADMIN — WATER 40 MG: 1 INJECTION INTRAMUSCULAR; INTRAVENOUS; SUBCUTANEOUS at 02:24

## 2025-02-01 RX ADMIN — SODIUM CHLORIDE, PRESERVATIVE FREE 10 ML: 5 INJECTION INTRAVENOUS at 21:00

## 2025-02-01 RX ADMIN — IPRATROPIUM BROMIDE 0.5 MG: 0.5 SOLUTION RESPIRATORY (INHALATION) at 14:30

## 2025-02-01 ASSESSMENT — PAIN SCALES - GENERAL
PAINLEVEL_OUTOF10: 0
PAINLEVEL_OUTOF10: 3
PAINLEVEL_OUTOF10: 0

## 2025-02-01 ASSESSMENT — PAIN DESCRIPTION - DESCRIPTORS: DESCRIPTORS: ACHING

## 2025-02-01 ASSESSMENT — PAIN - FUNCTIONAL ASSESSMENT: PAIN_FUNCTIONAL_ASSESSMENT: PREVENTS OR INTERFERES SOME ACTIVE ACTIVITIES AND ADLS

## 2025-02-01 ASSESSMENT — PAIN DESCRIPTION - LOCATION: LOCATION: CHEST

## 2025-02-01 ASSESSMENT — PAIN DESCRIPTION - ORIENTATION: ORIENTATION: RIGHT;LEFT

## 2025-02-01 NOTE — PROGRESS NOTES
Hospitalist Progress Note    NAME:   Katie Wu   : 1950   MRN: [id number]     Date/Time: 2025 10:43 AM  Patient PCP: Henry Amaro MD    Estimated discharge date:  Barriers: Oxygen titration      Assessment / Plan:  Acute exacerbation of COPD  Small left basilar infiltrate on CT-?  Pneumonia  Moderate pulmonary emphysema    COVID and flu negative  Legionella antigen pending  Respiratory culture pending  Procalcitonin <0.05    CRP 1.27    Plan  -Continue with oxygen supplementation as needed, wean as tolerated  -Continue with antibiotics: Augmentin and azithromycin  -Continue with Solu-Medrol followed by tapering prednisone  -Continue with albuterol nebulization   -continue with the substitute while admitted and resume Trulicity 100 Mg daily at discharge  -Pulmonology evaluated and recommended PFT as outpatient after acute flareup resolves  : Discussed with patient and nursing about monitoring patient off oxygen today as well as evaluating her for home oxygen need.  Patient is currently on p.o. prednisone, DuoNebs, Augmentin and azithromycin.    Abnormal EKG  Elevated troponin-likely demand ischemia in setting of COPD/hypoxemia  Atrial flutter POA EKG  Sinus rhythm with multiple PVC on repeat EKG  Echo: Normal left ventricular systolic function with EF 55 to 60%, mild MR, mild TR, left atrium dilated  Evaluated by cardiology: No further recommendation at present.  Signed off      Hypertension  Continue with amlodipine    High risk of hospital-acquired delirium  Continue with delirium precaution  Melatonin nightly, thiamine daily      Medical Decision Making:   I personally reviewed labs: Yes  I personally reviewed imaging: Yes  I personally reviewed EKG: Yes  Toxic drug monitoring: Azithromycin-QTc  Discussed case with: Patient, RN        Code Status: Full code  DVT Prophylaxis:   GI Prophylaxis:    Subjective:     Chief Complaint / Reason for Physician Visit  : Patient is

## 2025-02-01 NOTE — PLAN OF CARE
Problem: Discharge Planning  Goal: Discharge to home or other facility with appropriate resources  Outcome: Progressing     Problem: Safety - Adult  Goal: Free from fall injury  1/31/2025 2155 by Shelia Mayers RN  Outcome: Progressing  1/31/2025 1138 by Leila Alvares LPN  Outcome: Progressing     Problem: Respiratory - Adult  Goal: Achieves optimal ventilation and oxygenation  1/31/2025 2155 by Shelia Mayers RN  Outcome: Progressing  1/31/2025 2148 by Nay Youngblood RCP  Outcome: Progressing

## 2025-02-01 NOTE — PROGRESS NOTES
End of Shift Note    Bedside shift change report given to DEON Frausto (oncoming nurse) by Shelia Mayers RN (offgoing nurse).  Report included the following information SBAR, Kardex, Intake/Output, MAR, Med Rec Status, and Cardiac Rhythm NSR    Shift worked:  8465-8140     Shift summary and any significant changes:     Pt.had uneventful night.AXO4, on 1 LPM nasal cannula oxygen support, ambulatory and not in distress.     Concerns for physician to address:       Zone phone for oncoming shift:          Activity:  Level of Assistance: Independent  Number times ambulated in hallways past shift: 0  Number of times OOB to chair past shift: 1    Cardiac:   Cardiac Monitoring: Yes      Cardiac Rhythm: Sinus rhythm    Access:  Current line(s): PIV     Genitourinary:   Urinary Status: Voiding, Bathroom privileges    Respiratory:   O2 Device: Nasal cannula  Chronic home O2 use?: NO  Incentive spirometer at bedside: YES    GI:  Last BM (including prior to admit): 01/30/25  Current diet:  ADULT DIET; Regular  Passing flatus: YES    Pain Management:   Patient states pain is manageable on current regimen: YES    Skin:  Ventura Scale Score: 21  Interventions: Wound Offloading (Prevention Methods): Pillows    Patient Safety:  Fall Risk: Nursing Judgement-Fall Risk High(Add Comments): No  Fall Risk Interventions  Nursing Judgement-Fall Risk High(Add Comments): No  Toilet Every 2 Hours-In Advance of Need: Yes  Hourly Visual Checks: In bed, Awake  Fall Visual Posted: Socks  Room Door Open: Deferred to promote rest  Alarm On: Other (Comment) (ad royce)  Patient Moved Closer to Nursing Station: No    Active Consults:   IP CONSULT TO CARDIOLOGY  IP CONSULT TO PULMONOLOGY    Length of Stay:  Expected LOS: 4  Actual LOS: 2    Shelia Mayers RN

## 2025-02-01 NOTE — PROGRESS NOTES
End of Shift Note    Bedside shift change report given to FRANKLIN Patterson (oncoming nurse) by Lalit Burgess RN (offgoing nurse).  Report included the following information SBAR, Kardex, ED Summary, Intake/Output, MAR, Recent Results, Med Rec Status, and Quality Measures    Shift worked:  7a-7p     Shift summary and any significant changes:     Patient tolerated diet. Voiced no complaints of pain throughout shift. Remained stable at shift change.      Concerns for physician to address:  None      Zone phone for oncoming shift:   [phone]       Activity:  Level of Assistance: Independent  Number times ambulated in hallways past shift: 0  Number of times OOB to chair past shift: 2    Cardiac:   Cardiac Monitoring: No      Cardiac Rhythm: Sinus rhythm    Access:  Current line(s): PIV     Genitourinary:   Urinary Status: Voiding, Bathroom privileges    Respiratory:   O2 Device: Nasal cannula  Chronic home O2 use?: NO  Incentive spirometer at bedside: YES    GI:  Last BM (including prior to admit): 01/30/25  Current diet:  ADULT DIET; Regular  Passing flatus: YES    Pain Management:   Patient states pain is manageable on current regimen: YES    Skin:  Ventura Scale Score: 20  Interventions: Wound Offloading (Prevention Methods): Turning, Repositioning    Patient Safety:  Fall Risk: Nursing Judgement-Fall Risk High(Add Comments): No  Fall Risk Interventions  Nursing Judgement-Fall Risk High(Add Comments): No  Toilet Every 2 Hours-In Advance of Need: Yes  Hourly Visual Checks: In bed, Awake  Fall Visual Posted: Socks  Room Door Open: Deferred to promote rest  Alarm On: Other (Comment) (ad royce)  Patient Moved Closer to Nursing Station: No    Active Consults:   IP CONSULT TO CARDIOLOGY  IP CONSULT TO PULMONOLOGY    Length of Stay:  Expected LOS: 4  Actual LOS: 2    Lalit Burgess RN

## 2025-02-01 NOTE — PLAN OF CARE
Problem: Discharge Planning  Goal: Discharge to home or other facility with appropriate resources  2/1/2025 1133 by Lalit Burgess RN  Outcome: Progressing  Flowsheets (Taken 2/1/2025 1133)  Discharge to home or other facility with appropriate resources:   Identify barriers to discharge with patient and caregiver   Arrange for needed discharge resources and transportation as appropriate   Identify discharge learning needs (meds, wound care, etc)  1/31/2025 2155 by Shelia Mayers RN  Outcome: Progressing     Problem: Safety - Adult  Goal: Free from fall injury  2/1/2025 1133 by Lalit Burgess RN  Outcome: Progressing  1/31/2025 2155 by Shelia Mayers RN  Outcome: Progressing     Problem: Respiratory - Adult  Goal: Achieves optimal ventilation and oxygenation  2/1/2025 1133 by Lalit Burgess RN  Outcome: Progressing  Flowsheets (Taken 2/1/2025 1133)  Achieves optimal ventilation and oxygenation:   Assess for changes in respiratory status   Assess for changes in mentation and behavior   Position to facilitate oxygenation and minimize respiratory effort   Oxygen supplementation based on oxygen saturation or arterial blood gases   Assess and instruct to report shortness of breath or any respiratory difficulty   Respiratory therapy support as indicated  2/1/2025 0830 by Courtney Mckinley RCP  Outcome: Progressing  1/31/2025 2155 by Shelia Mayers RN  Outcome: Progressing  1/31/2025 2148 by Nay Youngblood RCP  Outcome: Progressing     Problem: Pain  Goal: Verbalizes/displays adequate comfort level or baseline comfort level  Outcome: Progressing

## 2025-02-02 VITALS
WEIGHT: 108 LBS | BODY MASS INDEX: 19.14 KG/M2 | OXYGEN SATURATION: 95 % | RESPIRATION RATE: 18 BRPM | DIASTOLIC BLOOD PRESSURE: 72 MMHG | HEART RATE: 84 BPM | TEMPERATURE: 98.4 F | HEIGHT: 63 IN | SYSTOLIC BLOOD PRESSURE: 129 MMHG

## 2025-02-02 LAB
BACTERIA SPEC CULT: NORMAL
EKG ATRIAL RATE: 69 BPM
EKG DIAGNOSIS: NORMAL
EKG P AXIS: 80 DEGREES
EKG P-R INTERVAL: 158 MS
EKG Q-T INTERVAL: 400 MS
EKG QRS DURATION: 70 MS
EKG QTC CALCULATION (BAZETT): 428 MS
EKG R AXIS: 92 DEGREES
EKG T AXIS: 87 DEGREES
EKG VENTRICULAR RATE: 69 BPM
FLUID CULTURE: NORMAL
GRAM STN SPEC: NORMAL
L PNEUMO1 AG UR QL IA: NEGATIVE
Lab: NORMAL
ORGANISM ID: NORMAL
S PNEUM AG SPEC QL LA: NEGATIVE
SERVICE CMNT-IMP: NORMAL
SPECIMEN SOURCE: NORMAL
SPECIMEN SOURCE: NORMAL
SPECIMEN: NORMAL

## 2025-02-02 PROCEDURE — 94640 AIRWAY INHALATION TREATMENT: CPT

## 2025-02-02 PROCEDURE — 6370000000 HC RX 637 (ALT 250 FOR IP): Performed by: STUDENT IN AN ORGANIZED HEALTH CARE EDUCATION/TRAINING PROGRAM

## 2025-02-02 PROCEDURE — 2500000003 HC RX 250 WO HCPCS: Performed by: STUDENT IN AN ORGANIZED HEALTH CARE EDUCATION/TRAINING PROGRAM

## 2025-02-02 PROCEDURE — 6360000002 HC RX W HCPCS: Performed by: STUDENT IN AN ORGANIZED HEALTH CARE EDUCATION/TRAINING PROGRAM

## 2025-02-02 PROCEDURE — 6370000000 HC RX 637 (ALT 250 FOR IP): Performed by: INTERNAL MEDICINE

## 2025-02-02 PROCEDURE — 6360000002 HC RX W HCPCS: Performed by: INTERNAL MEDICINE

## 2025-02-02 RX ORDER — PREDNISONE 10 MG/1
TABLET ORAL
Qty: 18 TABLET | Refills: 0 | Status: SHIPPED | OUTPATIENT
Start: 2025-02-03 | End: 2025-02-12

## 2025-02-02 RX ADMIN — AMLODIPINE BESYLATE 10 MG: 5 TABLET ORAL at 09:18

## 2025-02-02 RX ADMIN — BUDESONIDE 250 MCG: 0.25 INHALANT RESPIRATORY (INHALATION) at 08:00

## 2025-02-02 RX ADMIN — PANTOPRAZOLE SODIUM 40 MG: 40 TABLET, DELAYED RELEASE ORAL at 06:39

## 2025-02-02 RX ADMIN — SODIUM CHLORIDE, PRESERVATIVE FREE 10 ML: 5 INJECTION INTRAVENOUS at 09:18

## 2025-02-02 RX ADMIN — IPRATROPIUM BROMIDE 0.5 MG: 0.5 SOLUTION RESPIRATORY (INHALATION) at 08:00

## 2025-02-02 RX ADMIN — PREDNISONE 40 MG: 20 TABLET ORAL at 09:19

## 2025-02-02 RX ADMIN — ARFORMOTEROL TARTRATE 15 MCG: 15 SOLUTION RESPIRATORY (INHALATION) at 08:00

## 2025-02-02 RX ADMIN — AMOXICILLIN AND CLAVULANATE POTASSIUM 1 TABLET: 875; 125 TABLET, FILM COATED ORAL at 09:18

## 2025-02-02 RX ADMIN — ENOXAPARIN SODIUM 30 MG: 100 INJECTION SUBCUTANEOUS at 09:19

## 2025-02-02 ASSESSMENT — PAIN SCALES - GENERAL
PAINLEVEL_OUTOF10: 0
PAINLEVEL_OUTOF10: 0

## 2025-02-02 NOTE — DISCHARGE SUMMARY
Discharge Summary    Name: Katie Wu  [id number]  YOB: 1950 (Age: 75 y.o.)   Date of Admission: 1/30/2025  Date of Discharge: 2/2/2025  Attending Physician: James Myers MD    Discharge Diagnosis:     Consultations:  IP CONSULT TO CARDIOLOGY  IP CONSULT TO PULMONOLOGY      Brief Admission History/Reason for Admission Per Zaid Miles MD:       Brief Hospital Course by Main Problems:     Acute exacerbation of COPD  Small left basilar infiltrate on CT-?  Pneumonia  Moderate pulmonary emphysema     COVID and flu negative  Legionella antigen pending but patient much improved and wants discharge  Respiratory culture pending but patient much better and desres discharge  Procalcitonin <0.05    CRP 1.27     Plan  -Continue with oxygen supplementation as needed, weaned to RA  -Continue with antibiotics: Augmentin 4 more days/ azithromycin has completed  -Continue with Solu-Medrol followed by tapering prednisone  -Continue with albuterol nebulization   -continue with the substitute while admitted and resume Trulicity 100 Mg daily at discharge  -Pulmonology evaluated and recommended PFT as outpatient after acute flareup resolves  2/1: Discussed with patient and nursing about monitoring patient off oxygen today as well as evaluating her for home oxygen need.  Patient is currently on p.o. prednisone, DuoNebs, Augmentin and azithromycin.  2/2 patient much better wants discharge home     Abnormal EKG  Elevated troponin-likely demand ischemia in setting of COPD/hypoxemia  Atrial flutter POA EKG  Sinus rhythm with multiple PVC on repeat EKG  Echo: Normal left ventricular systolic function with EF 55 to 60%, mild MR, mild TR, left atrium dilated  Evaluated by cardiology: No further recommendation at present.  Signed off        Hypertension  Continue with amlodipine     High risk of hospital-acquired delirium  Continue with delirium precaution  Melatonin nightly,

## 2025-02-02 NOTE — PLAN OF CARE
Problem: Respiratory - Adult  Goal: Achieves optimal ventilation and oxygenation  2/1/2025 2202 by Nay Youngblood RCP  Outcome: Progressing  2/1/2025 1133 by Lalit Burgess RN  Outcome: Progressing  Flowsheets (Taken 2/1/2025 1133)  Achieves optimal ventilation and oxygenation:   Assess for changes in respiratory status   Assess for changes in mentation and behavior   Position to facilitate oxygenation and minimize respiratory effort   Oxygen supplementation based on oxygen saturation or arterial blood gases   Assess and instruct to report shortness of breath or any respiratory difficulty   Respiratory therapy support as indicated  2/1/2025 0830 by Courtney Mckinley RCP  Outcome: Progressing

## 2025-02-02 NOTE — CARE COORDINATION
02/02/25 1419   Discharge Planning   Type of Residence House   Services At/After Discharge   Transition of Care Consult (CM Consult) N/A   Services At/After Discharge None   [place] Resource Information Provided? No   Mode of Transport at Discharge Other (see comment)  (family)   Confirm Follow Up Transport Self   Condition of Participation: Discharge Planning   The Plan for Transition of Care is related to the following treatment goals: Home   The Patient and/or Patient Representative was provided with a Choice of Provider? Patient   The Patient and/Or Patient Representative agree with the Discharge Plan? Yes   Freedom of Choice list was provided with basic dialogue that supports the patient's individualized plan of care/goals, treatment preferences, and shares the quality data associated with the providers?  Yes

## 2025-02-02 NOTE — PROGRESS NOTES
End of Shift Note    Bedside shift change report given to DEON Cohn (oncoming nurse) by Yesenia Carter LPN (offgoing nurse).  Report included the following information SBAR, Kardex, Intake/Output, MAR, Accordion, Recent Results, and Med Rec Status    Shift worked:  9948-5147     Shift summary and any significant changes:    No lab collection orders. Patient remains on RA with SpO2 sats >94%. No complaints of pain during shift. No complaints at the time of shift change. Plan of care ongoing.   Concerns for physician to address:  N/a     Zone phone for oncoming shift:   [phone]       Activity:  Level of Assistance: Independent  Number times ambulated in hallways past shift: 0  Number of times OOB to chair past shift: 5+    Cardiac:   Cardiac Monitoring: No      Cardiac Rhythm: Sinus rhythm    Access:  Current line(s): PIV     Genitourinary:   Urinary Status: Voiding, Bathroom privileges    Respiratory:   O2 Device: None (Room air)  Chronic home O2 use?: NO  Incentive spirometer at bedside: YES    GI:  Last BM (including prior to admit): 02/01/25  Current diet:  ADULT DIET; Regular  Passing flatus: YES    Pain Management:   Patient states pain is manageable on current regimen: YES    Skin:  Ventura Scale Score: 22  Interventions: Wound Offloading (Prevention Methods): Repositioning, Pillows, Elevate heels    Patient Safety:  Fall Risk: Nursing Judgement-Fall Risk High(Add Comments): No  Fall Risk Interventions  Nursing Judgement-Fall Risk High(Add Comments): No  Toilet Every 2 Hours-In Advance of Need: Yes  Hourly Visual Checks: Awake, In chair  Fall Visual Posted: Socks, Fall sign posted  Room Door Open: Deferred to promote rest  Alarm On: Other (Comment) (up ad royce)  Patient Moved Closer to Nursing Station: No    Active Consults:   IP CONSULT TO CARDIOLOGY  IP CONSULT TO PULMONOLOGY    Length of Stay:  Expected LOS: 4  Actual LOS: 2    Yesenia Carter LPN

## 2025-02-02 NOTE — PLAN OF CARE
Problem: Discharge Planning  Goal: Discharge to home or other facility with appropriate resources  Outcome: Progressing     Problem: Safety - Adult  Goal: Free from fall injury  Outcome: Progressing     Problem: Respiratory - Adult  Goal: Achieves optimal ventilation and oxygenation  2/2/2025 0820 by Lalit Burgess RN  Outcome: Progressing  Flowsheets (Taken 2/2/2025 0820)  Achieves optimal ventilation and oxygenation:   Assess for changes in respiratory status   Assess for changes in mentation and behavior   Position to facilitate oxygenation and minimize respiratory effort   Oxygen supplementation based on oxygen saturation or arterial blood gases   Assess and instruct to report shortness of breath or any respiratory difficulty   Respiratory therapy support as indicated  2/2/2025 0810 by Courtney Mckinley RCP  Outcome: Progressing  2/1/2025 2202 by Nay Youngblood RCP  Outcome: Progressing     Problem: Pain  Goal: Verbalizes/displays adequate comfort level or baseline comfort level  Outcome: Progressing

## 2025-02-02 NOTE — CARE COORDINATION
Transition of Care Plan:    RUR: 7    Prior Level of Functioning: Pt resides alone in a one level home with no ROSY.   Pt states she is independent with ADL's and is an active .     Disposition: Home  2:13 PM   Noted DC order.   Followed up with Pt about the Remote Patient Monitoring System and she is not interested in this at this time.    Family will transport Pt home and they will be here 3-4 PM  FRANCISCO: 2/2/25    If SNF or IPR: Date FOC offered:   Date FOC received:   Accepting facility:   Date authorization started with reference number:   Date authorization received and expires:     Follow up appointments: Weekend CM unable to schedule appt.  Pt will schedule on Monday.   DME needed: n/a  Transportation at discharge: Family btw 3-4 PM   IM/IMM Medicare/ letter given: 2nd IM 2/2/25  Is patient a [place] and connected with [facility]? N/a   If yes, was  transfer form completed and [facility] notified?   Caregiver Contact: DTR: Radha Wu: [phone]  Discharge Caregiver contacted prior to discharge? Pt is alert and oriented and wants to update her DTR  Care Conference needed? N/a  Barriers to discharge: n/a    No further CM needs.     Cielo Hernández, Weekend CM  [phone]

## 2025-02-07 ENCOUNTER — OFFICE VISIT (OUTPATIENT)
Facility: CLINIC | Age: 75
End: 2025-02-07
Payer: MEDICARE

## 2025-02-07 VITALS
OXYGEN SATURATION: 93 % | HEART RATE: 87 BPM | RESPIRATION RATE: 16 BRPM | HEIGHT: 63 IN | SYSTOLIC BLOOD PRESSURE: 132 MMHG | WEIGHT: 116.4 LBS | DIASTOLIC BLOOD PRESSURE: 65 MMHG | BODY MASS INDEX: 20.62 KG/M2 | TEMPERATURE: 97.9 F

## 2025-02-07 DIAGNOSIS — J44.1 COPD EXACERBATION (HCC): Primary | ICD-10-CM

## 2025-02-07 DIAGNOSIS — J96.01 ACUTE RESPIRATORY FAILURE WITH HYPOXIA: ICD-10-CM

## 2025-02-07 DIAGNOSIS — E04.9 NODULAR GOITER: ICD-10-CM

## 2025-02-07 DIAGNOSIS — I10 ESSENTIAL HYPERTENSION: ICD-10-CM

## 2025-02-07 DIAGNOSIS — J45.40 MODERATE PERSISTENT REACTIVE AIRWAY DISEASE WITHOUT COMPLICATION: ICD-10-CM

## 2025-02-07 PROCEDURE — 3075F SYST BP GE 130 - 139MM HG: CPT | Performed by: INTERNAL MEDICINE

## 2025-02-07 PROCEDURE — 1159F MED LIST DOCD IN RCRD: CPT | Performed by: INTERNAL MEDICINE

## 2025-02-07 PROCEDURE — G8427 DOCREV CUR MEDS BY ELIG CLIN: HCPCS | Performed by: INTERNAL MEDICINE

## 2025-02-07 PROCEDURE — 3017F COLORECTAL CA SCREEN DOC REV: CPT | Performed by: INTERNAL MEDICINE

## 2025-02-07 PROCEDURE — 1126F AMNT PAIN NOTED NONE PRSNT: CPT | Performed by: INTERNAL MEDICINE

## 2025-02-07 PROCEDURE — 1036F TOBACCO NON-USER: CPT | Performed by: INTERNAL MEDICINE

## 2025-02-07 PROCEDURE — 3078F DIAST BP <80 MM HG: CPT | Performed by: INTERNAL MEDICINE

## 2025-02-07 PROCEDURE — 3023F SPIROM DOC REV: CPT | Performed by: INTERNAL MEDICINE

## 2025-02-07 PROCEDURE — G8420 CALC BMI NORM PARAMETERS: HCPCS | Performed by: INTERNAL MEDICINE

## 2025-02-07 PROCEDURE — G8400 PT W/DXA NO RESULTS DOC: HCPCS | Performed by: INTERNAL MEDICINE

## 2025-02-07 PROCEDURE — 1090F PRES/ABSN URINE INCON ASSESS: CPT | Performed by: INTERNAL MEDICINE

## 2025-02-07 PROCEDURE — 1111F DSCHRG MED/CURRENT MED MERGE: CPT | Performed by: INTERNAL MEDICINE

## 2025-02-07 PROCEDURE — 99214 OFFICE O/P EST MOD 30 MIN: CPT | Performed by: INTERNAL MEDICINE

## 2025-02-07 PROCEDURE — 1123F ACP DISCUSS/DSCN MKR DOCD: CPT | Performed by: INTERNAL MEDICINE

## 2025-02-07 RX ORDER — PREDNISONE 10 MG/1
TABLET ORAL
Qty: 18 TABLET | Refills: 0 | Status: CANCELLED | OUTPATIENT
Start: 2025-02-07 | End: 2025-02-16

## 2025-02-07 RX ORDER — OMEPRAZOLE 40 MG/1
40 CAPSULE, DELAYED RELEASE ORAL
Qty: 90 CAPSULE | Refills: 3 | Status: SHIPPED | OUTPATIENT
Start: 2025-02-07

## 2025-02-07 SDOH — ECONOMIC STABILITY: FOOD INSECURITY: WITHIN THE PAST 12 MONTHS, THE FOOD YOU BOUGHT JUST DIDN'T LAST AND YOU DIDN'T HAVE MONEY TO GET MORE.: NEVER TRUE

## 2025-02-07 SDOH — ECONOMIC STABILITY: FOOD INSECURITY: WITHIN THE PAST 12 MONTHS, YOU WORRIED THAT YOUR FOOD WOULD RUN OUT BEFORE YOU GOT MONEY TO BUY MORE.: NEVER TRUE

## 2025-02-07 ASSESSMENT — PATIENT HEALTH QUESTIONNAIRE - PHQ9
SUM OF ALL RESPONSES TO PHQ QUESTIONS 1-9: 0
SUM OF ALL RESPONSES TO PHQ9 QUESTIONS 1 & 2: 0
SUM OF ALL RESPONSES TO PHQ QUESTIONS 1-9: 0
1. LITTLE INTEREST OR PLEASURE IN DOING THINGS: NOT AT ALL
2. FEELING DOWN, DEPRESSED OR HOPELESS: NOT AT ALL
SUM OF ALL RESPONSES TO PHQ QUESTIONS 1-9: 0
SUM OF ALL RESPONSES TO PHQ QUESTIONS 1-9: 0

## 2025-02-07 ASSESSMENT — ANXIETY QUESTIONNAIRES
GAD7 TOTAL SCORE: 0
4. TROUBLE RELAXING: NOT AT ALL
7. FEELING AFRAID AS IF SOMETHING AWFUL MIGHT HAPPEN: NOT AT ALL
1. FEELING NERVOUS, ANXIOUS, OR ON EDGE: NOT AT ALL
3. WORRYING TOO MUCH ABOUT DIFFERENT THINGS: NOT AT ALL
6. BECOMING EASILY ANNOYED OR IRRITABLE: NOT AT ALL
2. NOT BEING ABLE TO STOP OR CONTROL WORRYING: NOT AT ALL
5. BEING SO RESTLESS THAT IT IS HARD TO SIT STILL: NOT AT ALL
IF YOU CHECKED OFF ANY PROBLEMS ON THIS QUESTIONNAIRE, HOW DIFFICULT HAVE THESE PROBLEMS MADE IT FOR YOU TO DO YOUR WORK, TAKE CARE OF THINGS AT HOME, OR GET ALONG WITH OTHER PEOPLE: NOT DIFFICULT AT ALL

## 2025-02-07 NOTE — PROGRESS NOTES
Chief Complaint   Patient presents with    Follow-Up from Hospital     Patient states \" she is here for a hospital follow-up.\"    \"Have you been to the ER, urgent care clinic since your last visit?  Hospitalized since your last visit?\"    YES - When: approximately 1  weeks ago.  Where and Why: [facility]  Respiratory failure .    “Have you seen or consulted any other health care providers outside our system since your last visit?”    NO    Have you had a mammogram?”   NO    Date of last Mammogram: 4/8/2022

## 2025-02-07 NOTE — PROGRESS NOTES
[facility]  [facility] [address]   [address]  [facility] [address]  Phone:  [phone]  Fax: [phone]       Chief Complaint   Patient presents with    Follow-Up from Hospital   .      SUBJECTIVE:      History of Present Illness  The patient presents for evaluation of COPD.    She has been diagnosed with chronic obstructive pulmonary disease (COPD) for several years. She recently experienced a respiratory infection, which led to wheezing and a subsequent asthma exacerbation. This combination resulted in a significant drop in her oxygen saturation levels, necessitating hospitalization. During her hospital stay, she was treated with steroids and nebulizer treatments, which improved her condition. Her oxygen saturation level was recorded at 94 this morning. She continues to experience a cough and mild shortness of breath. She has an upcoming appointment with a pulmonologist scheduled for 05/2025. She reports feeling much better overall and has been able to maintain her daily activities, including walking. She is seeking a work excuse note as she plans to return to work on Monday. She has been using Trelegy once daily, which is covered by her insurance. She also takes prednisone, initially 2 tablets for 3 days, followed by 1 tablet for the next 3 days. She has noticed some weight gain since last week, which she attributes to the steroid treatment. She first experienced shortness of breath between the ages of 36 and 41.    MEDICATIONS  Current: Trelegy, prednisone, albuterol         Current Outpatient Medications   Medication Sig Dispense Refill    predniSONE (DELTASONE) 10 MG tablet Take 3 tablets by mouth daily for 3 days, THEN 2 tablets daily for 3 days, THEN 1 tablet daily for 3 days. 18 tablet 0    amLODIPine (NORVASC) 10 MG tablet TAKE 1 TABLET EVERY DAY 90 tablet 3    TRELEGY ELLIPTA 100-62.5-25 MCG/ACT AEPB inhaler INHALE 1 PUFF EVERY DAY 3 each 3    omeprazole (PRILOSEC) 40 MG

## 2025-02-11 NOTE — PROGRESS NOTES
Physician Progress Note      PATIENT:               CINDY ROACH  [facility] #:                  [id number]  :                       1950  ADMIT DATE:       2025 11:30 AM  DISCH DATE:        2025 3:08 PM  RESPONDING  PROVIDER #:        James Myers MD          QUERY TEXT:    Patient admitted with COPD exacerbation. Documentation reflects ? pneumonia.    If possible, please document in the progress notes and discharge summary if   pneumonia was:    The medical record reflects the following:  Risk Factors: 75 y.o.  female with PMHx significant for HTN, COPD who comes in   with complaints of 2-week history of shortness of breath.  Patient reports   that she has been having shortness of breath progressively worse for the past   2 weeks.  Clinical Indicators: Per H&P - CXR negative for acute pathology, CBC negative   for leukocytosis, BMP nonactionable.  Per  Pulm - Small left basilar infiltrate on reviewing chest CT images,   suspect pneumonia; Current chest CT images shows diffuse pulmonary emphysema,   small left basilar infiltrate, new from 3/2024, and no pulmonary emboli.  Of   note, prior medial left lung infiltrate has resolved.  Per  Med - Small left basilar infiltrate on CT-?  Pneumonia; Procalcitonin   <0.05  Treatment: p.o. prednisone, DuoNebs, Augmentin and azithromycin.    Thank you,  Kennedy Denney RN, CDI  [email or web address]  >  Options provided:  -- Possible pneumonia confirmed after study  -- Possible pneumonia ruled out after study  -- Other - I will add my own diagnosis  -- Disagree - Not applicable / Not valid  -- Disagree - Clinically unable to determine / Unknown  -- Refer to Clinical Documentation Reviewer    PROVIDER RESPONSE TEXT:    Possible pneumonia ruled out after study.    Query created by: Kennedy Denney on 2/10/2025 8:43 AM      QUERY TEXT:    Patient admitted with COPD exacerbation. Noted documentation of acute   respiratory failure by ED provider and on

## 2025-02-13 NOTE — PROGRESS NOTES
Physician Progress Note      PATIENT:               CINDY ROACH  CSN #:                  [id number]  :                       1950  ADMIT DATE:       2025 11:30 AM  DISCH DATE:        2025 3:08 PM  RESPONDING  PROVIDER #:        James Myers MD          QUERY TEXT:    Thank you for your previous response of unable to determine and \"need to know   more info\" to the query seeking clinical validation of acute respiratory   failure. Please provide clinical indicators or document if the dx has been   ruled out.  Patient admitted with COPD exacerbation. Noted documentation of   acute respiratory failure by ED provider and on  medicine progress note.   In order to support the diagnosis of acute respiratory failure, please include   additional clinical indicators in your documentation.  Or please document if   the diagnosis of acute respiratory failure has been ruled out after further   study.    The medical record reflects the following:  Risk Factors: 75 y.o.  female with PMHx significant for HTN, COPD who comes in   with complaints of 2-week history of shortness of breath.  Patient reports   that she has been having shortness of breath progressively worse for the past   2 weeks.  Clinical Indicators: Per ED provider - Mild tachypnea, no respiratory   distress, wheezing throughout all lung fields; Acute hypoxic respiratory   failure  Per H&P - Patient reports that she has been having shortness of breath   progressively worse for the past 2 weeks.  Does endorse increased coughing,   more short of breath that has progressively gotten worse and thus why she came   to the ED.  COPD exacerbation; Requiring oxygen supplement;  No accessory muscle use.  Per  Med: She is currently in respiratory failure, likely exacerbated by   her recent cold. Her respiratory status is poor, with significant wheezing   heard even at rest. Immediate hospitalization is recommended for further   evaluation and

## 2025-02-14 ENCOUNTER — APPOINTMENT (OUTPATIENT)
Facility: HOSPITAL | Age: 75
End: 2025-02-14
Payer: MEDICARE

## 2025-02-14 ENCOUNTER — HOSPITAL ENCOUNTER (EMERGENCY)
Facility: HOSPITAL | Age: 75
Discharge: HOME | End: 2025-02-14
Attending: EMERGENCY MEDICINE
Payer: MEDICARE

## 2025-02-14 VITALS
SYSTOLIC BLOOD PRESSURE: 144 MMHG | DIASTOLIC BLOOD PRESSURE: 66 MMHG | RESPIRATION RATE: 16 BRPM | OXYGEN SATURATION: 93 % | TEMPERATURE: 98.1 F | HEART RATE: 94 BPM

## 2025-02-14 DIAGNOSIS — N17.9 AKI (ACUTE KIDNEY INJURY): ICD-10-CM

## 2025-02-14 DIAGNOSIS — J44.1 COPD EXACERBATION (HCC): ICD-10-CM

## 2025-02-14 DIAGNOSIS — R04.0 EPISTAXIS: Primary | ICD-10-CM

## 2025-02-14 LAB
ALBUMIN SERPL-MCNC: 3.5 G/DL (ref 3.5–5)
ALBUMIN/GLOB SERPL: 1.1 (ref 1.1–2.2)
ALP SERPL-CCNC: 64 U/L (ref 45–117)
ALT SERPL-CCNC: 21 U/L (ref 12–78)
ANION GAP SERPL CALC-SCNC: 5 MMOL/L (ref 2–12)
AST SERPL-CCNC: 14 U/L (ref 15–37)
BASOPHILS # BLD: 0.08 K/UL (ref 0–0.1)
BASOPHILS NFR BLD: 0.5 % (ref 0–1)
BILIRUB SERPL-MCNC: 0.4 MG/DL (ref 0.2–1)
BUN SERPL-MCNC: 30 MG/DL (ref 6–20)
BUN/CREAT SERPL: 22 (ref 12–20)
CALCIUM SERPL-MCNC: 8.7 MG/DL (ref 8.5–10.1)
CHLORIDE SERPL-SCNC: 109 MMOL/L (ref 97–108)
CO2 SERPL-SCNC: 28 MMOL/L (ref 21–32)
CREAT SERPL-MCNC: 1.34 MG/DL (ref 0.55–1.02)
DIFFERENTIAL METHOD BLD: ABNORMAL
EKG ATRIAL RATE: 98 BPM
EKG DIAGNOSIS: NORMAL
EKG P AXIS: 77 DEGREES
EKG P-R INTERVAL: 142 MS
EKG Q-T INTERVAL: 314 MS
EKG QRS DURATION: 64 MS
EKG QTC CALCULATION (BAZETT): 400 MS
EKG R AXIS: 94 DEGREES
EKG T AXIS: 39 DEGREES
EKG VENTRICULAR RATE: 98 BPM
EOSINOPHIL # BLD: 0.3 K/UL (ref 0–0.4)
EOSINOPHIL NFR BLD: 1.8 % (ref 0–7)
ERYTHROCYTE [DISTWIDTH] IN BLOOD BY AUTOMATED COUNT: 14 % (ref 11.5–14.5)
GLOBULIN SER CALC-MCNC: 3.1 G/DL (ref 2–4)
GLUCOSE SERPL-MCNC: 122 MG/DL (ref 65–100)
HCT VFR BLD AUTO: 31.7 % (ref 35–47)
HGB BLD-MCNC: 10.3 G/DL (ref 11.5–16)
IMM GRANULOCYTES # BLD AUTO: 0.14 K/UL (ref 0–0.04)
IMM GRANULOCYTES NFR BLD AUTO: 0.8 % (ref 0–0.5)
INR PPP: 1 (ref 0.9–1.1)
LYMPHOCYTES # BLD: 1.8 K/UL (ref 0.8–3.5)
LYMPHOCYTES NFR BLD: 10.8 % (ref 12–49)
MCH RBC QN AUTO: 30.7 PG (ref 26–34)
MCHC RBC AUTO-ENTMCNC: 32.5 G/DL (ref 30–36.5)
MCV RBC AUTO: 94.3 FL (ref 80–99)
MONOCYTES # BLD: 1.16 K/UL (ref 0–1)
MONOCYTES NFR BLD: 7 % (ref 5–13)
NEUTS SEG # BLD: 13.17 K/UL (ref 1.8–8)
NEUTS SEG NFR BLD: 79.1 % (ref 32–75)
NRBC # BLD: 0 K/UL (ref 0–0.01)
NRBC BLD-RTO: 0 PER 100 WBC
PLATELET # BLD AUTO: 271 K/UL (ref 150–400)
PMV BLD AUTO: 9.2 FL (ref 8.9–12.9)
POTASSIUM SERPL-SCNC: 4 MMOL/L (ref 3.5–5.1)
PROT SERPL-MCNC: 6.6 G/DL (ref 6.4–8.2)
PROTHROMBIN TIME: 10.3 SEC (ref 9.2–11.2)
RBC # BLD AUTO: 3.36 M/UL (ref 3.8–5.2)
SODIUM SERPL-SCNC: 142 MMOL/L (ref 136–145)
WBC # BLD AUTO: 16.7 K/UL (ref 3.6–11)

## 2025-02-14 PROCEDURE — 94640 AIRWAY INHALATION TREATMENT: CPT

## 2025-02-14 PROCEDURE — 80053 COMPREHEN METABOLIC PANEL: CPT

## 2025-02-14 PROCEDURE — 99285 EMERGENCY DEPT VISIT HI MDM: CPT

## 2025-02-14 PROCEDURE — 71045 X-RAY EXAM CHEST 1 VIEW: CPT

## 2025-02-14 PROCEDURE — 6370000000 HC RX 637 (ALT 250 FOR IP): Performed by: EMERGENCY MEDICINE

## 2025-02-14 PROCEDURE — 6360000002 HC RX W HCPCS: Performed by: EMERGENCY MEDICINE

## 2025-02-14 PROCEDURE — 85610 PROTHROMBIN TIME: CPT

## 2025-02-14 PROCEDURE — 30901 CONTROL OF NOSEBLEED: CPT

## 2025-02-14 PROCEDURE — 2580000003 HC RX 258: Performed by: EMERGENCY MEDICINE

## 2025-02-14 PROCEDURE — 36415 COLL VENOUS BLD VENIPUNCTURE: CPT

## 2025-02-14 PROCEDURE — 85025 COMPLETE CBC W/AUTO DIFF WBC: CPT

## 2025-02-14 PROCEDURE — 96361 HYDRATE IV INFUSION ADD-ON: CPT

## 2025-02-14 PROCEDURE — 2500000003 HC RX 250 WO HCPCS: Performed by: EMERGENCY MEDICINE

## 2025-02-14 PROCEDURE — 96374 THER/PROPH/DIAG INJ IV PUSH: CPT

## 2025-02-14 PROCEDURE — 93005 ELECTROCARDIOGRAM TRACING: CPT | Performed by: EMERGENCY MEDICINE

## 2025-02-14 RX ORDER — DOXYCYCLINE HYCLATE 100 MG
100 TABLET ORAL 2 TIMES DAILY
Qty: 14 TABLET | Refills: 0 | Status: SHIPPED | OUTPATIENT
Start: 2025-02-14 | End: 2025-02-21

## 2025-02-14 RX ORDER — OXYMETAZOLINE HYDROCHLORIDE 0.05 G/100ML
2 SPRAY NASAL 2 TIMES DAILY
Qty: 2 ML | Refills: 0 | Status: SHIPPED | OUTPATIENT
Start: 2025-02-14 | End: 2025-02-17

## 2025-02-14 RX ORDER — ALBUTEROL SULFATE 90 UG/1
2 INHALANT RESPIRATORY (INHALATION) 4 TIMES DAILY PRN
Qty: 54 G | Refills: 1 | Status: SHIPPED | OUTPATIENT
Start: 2025-02-14

## 2025-02-14 RX ORDER — IPRATROPIUM BROMIDE AND ALBUTEROL SULFATE 2.5; .5 MG/3ML; MG/3ML
1 SOLUTION RESPIRATORY (INHALATION)
Status: COMPLETED | OUTPATIENT
Start: 2025-02-14 | End: 2025-02-14

## 2025-02-14 RX ORDER — OXYMETAZOLINE HYDROCHLORIDE 0.05 G/100ML
2 SPRAY NASAL
Status: COMPLETED | OUTPATIENT
Start: 2025-02-14 | End: 2025-02-14

## 2025-02-14 RX ORDER — PREDNISONE 20 MG/1
40 TABLET ORAL DAILY
Qty: 10 TABLET | Refills: 0 | Status: SHIPPED | OUTPATIENT
Start: 2025-02-14 | End: 2025-02-19

## 2025-02-14 RX ORDER — 0.9 % SODIUM CHLORIDE 0.9 %
1000 INTRAVENOUS SOLUTION INTRAVENOUS ONCE
Status: COMPLETED | OUTPATIENT
Start: 2025-02-14 | End: 2025-02-14

## 2025-02-14 RX ADMIN — WATER 100 MG: 1 INJECTION INTRAMUSCULAR; INTRAVENOUS; SUBCUTANEOUS at 06:24

## 2025-02-14 RX ADMIN — IPRATROPIUM BROMIDE AND ALBUTEROL SULFATE 1 DOSE: .5; 3 SOLUTION RESPIRATORY (INHALATION) at 03:43

## 2025-02-14 RX ADMIN — SODIUM CHLORIDE 1000 ML: 0.9 INJECTION, SOLUTION INTRAVENOUS at 04:16

## 2025-02-14 RX ADMIN — OXYMETAZOLINE HYDROCHLORIDE 2 SPRAY: 0.5 SPRAY NASAL at 02:28

## 2025-02-14 ASSESSMENT — PAIN SCALES - GENERAL: PAINLEVEL_OUTOF10: 0

## 2025-02-14 NOTE — DISCHARGE INSTRUCTIONS
Use Afrin twice daily for the next 3 days.  Do not use for more than 3 days.  Take prescribed prednisone, doxycycline for your COPD exacerbation.  Use albuterol as needed for shortness of breath.  Return to emergency department for any worsening symptoms.    Thank You!    It was a pleasure taking care of you in our Emergency Department today. We know that when you come to our Emergency Department, you are entrusting us with your health, comfort, and safety. Our physicians and nurses honor that trust, and truly appreciate the opportunity to care for you and your loved ones.      We also value your feedback. If you receive a survey about your Emergency Department experience today, please fill it out.  We care about our patients' feedback, and we listen to what you have to say.  Thank you.    Yoseph Nguyen MD  ________________________________________________________________________  I have included a copy of your lab results and/or radiologic studies from today's visit so you can have them easily available at your follow-up visit. We hope you feel better and please do not hesitate to contact the ED if you have any questions at all!    Recent Results (from the past 12 hour(s))   CBC with Auto Differential    Collection Time: 02/14/25  3:15 AM   Result Value Ref Range    WBC 16.7 (H) 3.6 - 11.0 K/uL    RBC 3.36 (L) 3.80 - 5.20 M/uL    Hemoglobin 10.3 (L) 11.5 - 16.0 g/dL    Hematocrit 31.7 (L) 35.0 - 47.0 %    MCV 94.3 80.0 - 99.0 FL    MCH 30.7 26.0 - 34.0 PG    MCHC 32.5 30.0 - 36.5 g/dL    RDW 14.0 11.5 - 14.5 %    Platelets 271 150 - 400 K/uL    MPV 9.2 8.9 - 12.9 FL    Nucleated RBCs 0.0 0  WBC    nRBC 0.00 0.00 - 0.01 K/uL    Neutrophils % 79.1 (H) 32.0 - 75.0 %    Lymphocytes % 10.8 (L) 12.0 - 49.0 %    Monocytes % 7.0 5.0 - 13.0 %    Eosinophils % 1.8 0.0 - 7.0 %    Basophils % 0.5 0.0 - 1.0 %    Immature Granulocytes % 0.8 (H) 0.0 - 0.5 %    Neutrophils Absolute 13.17 (H) 1.80 - 8.00 K/UL    Lymphocytes

## 2025-02-14 NOTE — ED PROVIDER NOTES
[facility] EMERGENCY DEPARTMENT  EMERGENCY DEPARTMENT ENCOUNTER       Pt Name: Katie Wu  MRN: [id number]  Birthdate 1950  Date of evaluation: 2/14/2025  Provider: Yoseph Nguyen MD   PCP: Henry Amaro MD  Note Started: 2:35 AM 2/14/25     CHIEF COMPLAINT       Chief Complaint   Patient presents with    Epistaxis     Patient arrives via EMS with complaint of epistaxis beginning around 0130 today. Patient reports this is the third episode today, the first one being around 2pm, that lasted for about 30 minutes, another that did not last long and now this one is the worst. Patient denies any blood thinners.       HISTORY OF PRESENT ILLNESS: 1 or more elements      History From: Patient, History limited by: None     Katie Wu is a 75 y.o. female with a history of COPD who presents with nosebleed.  She reports onset of right-sided nosebleed around 1 AM, several episodes of nosebleed today however unable to stop nosebleed this evening.  She reports associated recent cough, shortness of breath, history of COPD.  No blood thinner usage, no antiplatelet usage.     Nursing Notes were all reviewed and agreed with or any disagreements were addressed in the HPI.     REVIEW OF SYSTEMS        Positives and Pertinent negatives as per HPI.    PAST HISTORY     Past Medical History:  Past Medical History:   Diagnosis Date    COPD (chronic obstructive pulmonary disease) (HCC)     Emphysema of lung (HCC)     Hypertension About  5 yrs.ago       Past Surgical History:  Past Surgical History:   Procedure Laterality Date    BREAST BIOPSY Right     Benign. Patient thinks biopsy was on rt breast lower areolar area. Along time ago.    COLONOSCOPY N/A 5/23/2016    COLONOSCOPY performed by Tim Miranda MD at [facility] ENDOSCOPY    GI      colonscopy - about 2011    GYN      hysterectomy    HYSTERECTOMY (CERVIX STATUS UNKNOWN)      HYSTERECTOMY, TOTAL ABDOMINAL (CERVIX REMOVED)      ORTHOPEDIC SURGERY      surgery for broken  47.0 %    MCV 94.3 80.0 - 99.0 FL    MCH 30.7 26.0 - 34.0 PG    MCHC 32.5 30.0 - 36.5 g/dL    RDW 14.0 11.5 - 14.5 %    Platelets 271 150 - 400 K/uL    MPV 9.2 8.9 - 12.9 FL    Nucleated RBCs 0.0 0  WBC    nRBC 0.00 0.00 - 0.01 K/uL    Neutrophils % 79.1 (H) 32.0 - 75.0 %    Lymphocytes % 10.8 (L) 12.0 - 49.0 %    Monocytes % 7.0 5.0 - 13.0 %    Eosinophils % 1.8 0.0 - 7.0 %    Basophils % 0.5 0.0 - 1.0 %    Immature Granulocytes % 0.8 (H) 0.0 - 0.5 %    Neutrophils Absolute 13.17 (H) 1.80 - 8.00 K/UL    Lymphocytes Absolute 1.80 0.80 - 3.50 K/UL    Monocytes Absolute 1.16 (H) 0.00 - 1.00 K/UL    Eosinophils Absolute 0.30 0.00 - 0.40 K/UL    Basophils Absolute 0.08 0.00 - 0.10 K/UL    Immature Granulocytes Absolute 0.14 (H) 0.00 - 0.04 K/UL    Differential Type AUTOMATED     Comprehensive Metabolic Panel    Collection Time: 02/14/25  3:15 AM   Result Value Ref Range    Sodium 142 136 - 145 mmol/L    Potassium 4.0 3.5 - 5.1 mmol/L    Chloride 109 (H) 97 - 108 mmol/L    CO2 28 21 - 32 mmol/L    Anion Gap 5 2 - 12 mmol/L    Glucose 122 (H) 65 - 100 mg/dL    BUN 30 (H) 6 - 20 MG/DL    Creatinine 1.34 (H) 0.55 - 1.02 MG/DL    BUN/Creatinine Ratio 22 (H) 12 - 20      Est, Glom Filt Rate 42 (L) >60 ml/min/1.73m2    Calcium 8.7 8.5 - 10.1 MG/DL    Total Bilirubin 0.4 0.2 - 1.0 MG/DL    ALT 21 12 - 78 U/L    AST 14 (L) 15 - 37 U/L    Alk Phosphatase 64 45 - 117 U/L    Total Protein 6.6 6.4 - 8.2 g/dL    Albumin 3.5 3.5 - 5.0 g/dL    Globulin 3.1 2.0 - 4.0 g/dL    Albumin/Globulin Ratio 1.1 1.1 - 2.2     Protime-INR    Collection Time: 02/14/25  3:15 AM   Result Value Ref Range    INR 1.0 0.9 - 1.1      Protime 10.3 9.2 - 11.2 sec   EKG 12 Lead    Collection Time: 02/14/25  3:25 AM   Result Value Ref Range    Ventricular Rate 98 BPM    Atrial Rate 98 BPM    P-R Interval 142 ms    QRS Duration 64 ms    Q-T Interval 314 ms    QTc Calculation (Bazett) 400 ms    P Axis 77 degrees    R Axis 94 degrees    T Axis 39 degrees

## 2025-02-14 NOTE — ED NOTES
Report given to DEON Porter by Chris Patel RN. Nurse was informed of reason for arrival, vitals, labs, medications, orders, procedures, results, anything left pending and current plan of action. Questions were asked and received prior to departure from the patient.

## 2025-02-21 ENCOUNTER — OFFICE VISIT (OUTPATIENT)
Facility: CLINIC | Age: 75
End: 2025-02-21

## 2025-02-21 VITALS
TEMPERATURE: 98.7 F | BODY MASS INDEX: 19.42 KG/M2 | OXYGEN SATURATION: 93 % | DIASTOLIC BLOOD PRESSURE: 71 MMHG | HEIGHT: 63 IN | SYSTOLIC BLOOD PRESSURE: 131 MMHG | WEIGHT: 109.6 LBS | HEART RATE: 85 BPM | RESPIRATION RATE: 17 BRPM

## 2025-02-21 DIAGNOSIS — R04.0 EPISTAXIS: Primary | ICD-10-CM

## 2025-02-21 RX ORDER — OXYMETAZOLINE HYDROCHLORIDE 5 G/100ML
SPRAY NASAL
COMMUNITY
Start: 2025-02-17

## 2025-02-21 NOTE — PROGRESS NOTES
\"Have you been to the ER, urgent care clinic since your last visit?  Hospitalized since your last visit?\"    YES - When: approximately 1  weeks ago.  Where and Why: [facility] for nose bleed.    “Have you seen or consulted any other health care providers outside our system since your last visit?”    NO    Have you had a mammogram?”   NO  Chief Complaint   Patient presents with    Follow-up     Nose bleed         Date of last Mammogram: 4/8/2022

## 2025-03-13 ENCOUNTER — OFFICE VISIT (OUTPATIENT)
Facility: CLINIC | Age: 75
End: 2025-03-13
Payer: MEDICARE

## 2025-03-13 VITALS
WEIGHT: 112.2 LBS | SYSTOLIC BLOOD PRESSURE: 154 MMHG | BODY MASS INDEX: 19.88 KG/M2 | OXYGEN SATURATION: 94 % | HEART RATE: 81 BPM | TEMPERATURE: 97.8 F | DIASTOLIC BLOOD PRESSURE: 80 MMHG | HEIGHT: 63 IN | RESPIRATION RATE: 18 BRPM

## 2025-03-13 DIAGNOSIS — J45.20 MILD INTERMITTENT REACTIVE AIRWAY DISEASE WITHOUT COMPLICATION: ICD-10-CM

## 2025-03-13 DIAGNOSIS — R04.0 EPISTAXIS: ICD-10-CM

## 2025-03-13 DIAGNOSIS — I10 ESSENTIAL HYPERTENSION: ICD-10-CM

## 2025-03-13 DIAGNOSIS — R79.89 PRERENAL AZOTEMIA: ICD-10-CM

## 2025-03-13 DIAGNOSIS — J43.1 PANLOBULAR EMPHYSEMA (HCC): Primary | ICD-10-CM

## 2025-03-13 PROCEDURE — G8420 CALC BMI NORM PARAMETERS: HCPCS | Performed by: INTERNAL MEDICINE

## 2025-03-13 PROCEDURE — 1126F AMNT PAIN NOTED NONE PRSNT: CPT | Performed by: INTERNAL MEDICINE

## 2025-03-13 PROCEDURE — 99214 OFFICE O/P EST MOD 30 MIN: CPT | Performed by: INTERNAL MEDICINE

## 2025-03-13 PROCEDURE — 3023F SPIROM DOC REV: CPT | Performed by: INTERNAL MEDICINE

## 2025-03-13 PROCEDURE — 3078F DIAST BP <80 MM HG: CPT | Performed by: INTERNAL MEDICINE

## 2025-03-13 PROCEDURE — 1090F PRES/ABSN URINE INCON ASSESS: CPT | Performed by: INTERNAL MEDICINE

## 2025-03-13 PROCEDURE — G8427 DOCREV CUR MEDS BY ELIG CLIN: HCPCS | Performed by: INTERNAL MEDICINE

## 2025-03-13 PROCEDURE — 1159F MED LIST DOCD IN RCRD: CPT | Performed by: INTERNAL MEDICINE

## 2025-03-13 PROCEDURE — 3077F SYST BP >= 140 MM HG: CPT | Performed by: INTERNAL MEDICINE

## 2025-03-13 PROCEDURE — 1123F ACP DISCUSS/DSCN MKR DOCD: CPT | Performed by: INTERNAL MEDICINE

## 2025-03-13 PROCEDURE — 3017F COLORECTAL CA SCREEN DOC REV: CPT | Performed by: INTERNAL MEDICINE

## 2025-03-13 PROCEDURE — G8400 PT W/DXA NO RESULTS DOC: HCPCS | Performed by: INTERNAL MEDICINE

## 2025-03-13 PROCEDURE — 1036F TOBACCO NON-USER: CPT | Performed by: INTERNAL MEDICINE

## 2025-03-13 SDOH — ECONOMIC STABILITY: FOOD INSECURITY: WITHIN THE PAST 12 MONTHS, YOU WORRIED THAT YOUR FOOD WOULD RUN OUT BEFORE YOU GOT MONEY TO BUY MORE.: NEVER TRUE

## 2025-03-13 SDOH — ECONOMIC STABILITY: FOOD INSECURITY: WITHIN THE PAST 12 MONTHS, THE FOOD YOU BOUGHT JUST DIDN'T LAST AND YOU DIDN'T HAVE MONEY TO GET MORE.: NEVER TRUE

## 2025-03-13 ASSESSMENT — ANXIETY QUESTIONNAIRES
IF YOU CHECKED OFF ANY PROBLEMS ON THIS QUESTIONNAIRE, HOW DIFFICULT HAVE THESE PROBLEMS MADE IT FOR YOU TO DO YOUR WORK, TAKE CARE OF THINGS AT HOME, OR GET ALONG WITH OTHER PEOPLE: NOT DIFFICULT AT ALL
6. BECOMING EASILY ANNOYED OR IRRITABLE: NOT AT ALL
4. TROUBLE RELAXING: NOT AT ALL
7. FEELING AFRAID AS IF SOMETHING AWFUL MIGHT HAPPEN: NOT AT ALL
GAD7 TOTAL SCORE: 0
2. NOT BEING ABLE TO STOP OR CONTROL WORRYING: NOT AT ALL
3. WORRYING TOO MUCH ABOUT DIFFERENT THINGS: NOT AT ALL
1. FEELING NERVOUS, ANXIOUS, OR ON EDGE: NOT AT ALL
5. BEING SO RESTLESS THAT IT IS HARD TO SIT STILL: NOT AT ALL

## 2025-03-13 ASSESSMENT — PATIENT HEALTH QUESTIONNAIRE - PHQ9
1. LITTLE INTEREST OR PLEASURE IN DOING THINGS: NOT AT ALL
2. FEELING DOWN, DEPRESSED OR HOPELESS: NOT AT ALL
SUM OF ALL RESPONSES TO PHQ QUESTIONS 1-9: 0

## 2025-03-13 NOTE — PROGRESS NOTES
Chief Complaint   Patient presents with    Hypertension     Patient states \" she is present for a follow-up.\"    \"Have you been to the ER, urgent care clinic since your last visit?  Hospitalized since your last visit?\"    NO    “Have you seen or consulted any other health care providers outside our system since your last visit?”    NO    Have you had a mammogram?”   NO    Date of last Mammogram: 4/8/2022

## 2025-03-15 LAB
APTT PPP: 26.2 SEC (ref 22.1–31)
THERAPEUTIC RANGE: NORMAL SECS (ref 58–77)

## 2025-03-15 NOTE — PROGRESS NOTES
[facility]  [facility]  [address]  [facility] [address]  Phone:  [phone]  Fax: [phone]       Chief Complaint   Patient presents with    Hypertension   .      SUBJECTIVE:      History of Present Illness  The patient presents for evaluation of shortness of breath.    She reports a slight exacerbation in her dyspnea, which she attributes to the discontinuation of her inhaler therapy for a period of 3 weeks. She experiences breathlessness during brisk walking. She has not yet undergone the blood test that was ordered during her previous visit. She has procured her Trelegy medication from the pharmacy, which is expected to last her for a duration of 90 days. She continues to be employed.  She does have home O2.    This cessation was due to a nosebleed episode, which was subsequently evaluated by an ENT specialist. The specialist recommended the use of saline nasal drops, which she has been utilizing. A nasal endoscopy performed by the specialist did not reveal any significant findings. She was also informed that in the event of recurrent nosebleeds, a nasal plug would be considered as a potential intervention.  Nothing grossly abnormal was noted.  She is using a nasal irrigant also.    She has a past history of reactive airway disease primary hypertension and a prerenal azotemia which fluctuates.    MEDICATIONS  Trelegy         Current Outpatient Medications   Medication Sig Dispense Refill    NASAL DECONGESTANT SPRAY 0.05 % nasal spray SPRAY 2 SPRAYS IN EACH NOSTRIL TWICE DAILY FOR 3 DAYS      albuterol sulfate HFA (VENTOLIN HFA) 108 (90 Base) MCG/ACT inhaler Inhale 2 puffs into the lungs 4 times daily as needed for Wheezing 54 g 1    omeprazole (PRILOSEC) 40 MG delayed release capsule Take 1 capsule by mouth every morning (before breakfast) 90 capsule 3    amLODIPine (NORVASC) 10 MG tablet TAKE 1 TABLET EVERY DAY 90 tablet 3    TRELEGY ELLIPTA 100-62.5-25 MCG/ACT AEPB

## 2025-03-16 LAB — A1AT SERPL-MCNC: 165 MG/DL (ref 101–187)

## 2025-03-18 LAB
A1AT PHENOTYP SERPL IFE: NORMAL
A1AT SERPL-MCNC: 165 MG/DL (ref 101–187)

## 2025-03-23 ENCOUNTER — RESULTS FOLLOW-UP (OUTPATIENT)
Facility: CLINIC | Age: 75
End: 2025-03-23

## 2025-07-01 ENCOUNTER — TELEPHONE (OUTPATIENT)
Facility: CLINIC | Age: 75
End: 2025-07-01

## 2025-07-01 NOTE — TELEPHONE ENCOUNTER
Patient daughter called stating patient has constant cough and per Dr. Amaro patient to start prednisone 20mg 1 tid x 10 days, albuterol inhaler, and a trelegy

## 2025-07-18 ENCOUNTER — APPOINTMENT (OUTPATIENT)
Facility: HOSPITAL | Age: 75
DRG: 190 | End: 2025-07-18
Payer: MEDICARE

## 2025-07-18 ENCOUNTER — HOSPITAL ENCOUNTER (INPATIENT)
Facility: HOSPITAL | Age: 75
LOS: 2 days | Discharge: HOME HEALTH | DRG: 190 | End: 2025-07-21
Attending: EMERGENCY MEDICINE | Admitting: STUDENT IN AN ORGANIZED HEALTH CARE EDUCATION/TRAINING PROGRAM
Payer: MEDICARE

## 2025-07-18 DIAGNOSIS — J44.1 COPD EXACERBATION (HCC): ICD-10-CM

## 2025-07-18 DIAGNOSIS — J96.01 ACUTE RESPIRATORY FAILURE WITH HYPOXIA (HCC): Primary | ICD-10-CM

## 2025-07-18 LAB
ALBUMIN SERPL-MCNC: 3.1 G/DL (ref 3.5–5)
ALBUMIN/GLOB SERPL: 0.8 (ref 1.1–2.2)
ALP SERPL-CCNC: 73 U/L (ref 45–117)
ALT SERPL-CCNC: 39 U/L (ref 12–78)
ANION GAP SERPL CALC-SCNC: 6 MMOL/L (ref 2–12)
AST SERPL-CCNC: 42 U/L (ref 15–37)
BASOPHILS # BLD: 0.04 K/UL (ref 0–0.1)
BASOPHILS NFR BLD: 0.3 % (ref 0–1)
BILIRUB SERPL-MCNC: 0.7 MG/DL (ref 0.2–1)
BUN SERPL-MCNC: 16 MG/DL (ref 6–20)
BUN/CREAT SERPL: 11 (ref 12–20)
CALCIUM SERPL-MCNC: 8.8 MG/DL (ref 8.5–10.1)
CHLORIDE SERPL-SCNC: 107 MMOL/L (ref 97–108)
CO2 SERPL-SCNC: 26 MMOL/L (ref 21–32)
COMMENT:: NORMAL
CREAT SERPL-MCNC: 1.5 MG/DL (ref 0.55–1.02)
DIFFERENTIAL METHOD BLD: ABNORMAL
EOSINOPHIL # BLD: 0.12 K/UL (ref 0–0.4)
EOSINOPHIL NFR BLD: 0.8 % (ref 0–7)
ERYTHROCYTE [DISTWIDTH] IN BLOOD BY AUTOMATED COUNT: 18.6 % (ref 11.5–14.5)
FLUAV RNA SPEC QL NAA+PROBE: NOT DETECTED
FLUBV RNA SPEC QL NAA+PROBE: NOT DETECTED
GLOBULIN SER CALC-MCNC: 3.7 G/DL (ref 2–4)
GLUCOSE SERPL-MCNC: 125 MG/DL (ref 65–100)
HCT VFR BLD AUTO: 26.6 % (ref 35–47)
HGB BLD-MCNC: 8.6 G/DL (ref 11.5–16)
IMM GRANULOCYTES # BLD AUTO: 0.07 K/UL (ref 0–0.04)
IMM GRANULOCYTES NFR BLD AUTO: 0.5 % (ref 0–0.5)
LACTATE BLD-SCNC: 0.89 MMOL/L (ref 0.4–2)
LYMPHOCYTES # BLD: 0.74 K/UL (ref 0.8–3.5)
LYMPHOCYTES NFR BLD: 5.1 % (ref 12–49)
MAGNESIUM SERPL-MCNC: 2.3 MG/DL (ref 1.6–2.4)
MCH RBC QN AUTO: 27.3 PG (ref 26–34)
MCHC RBC AUTO-ENTMCNC: 32.3 G/DL (ref 30–36.5)
MCV RBC AUTO: 84.4 FL (ref 80–99)
MONOCYTES # BLD: 1.45 K/UL (ref 0–1)
MONOCYTES NFR BLD: 10 % (ref 5–13)
NEUTS SEG # BLD: 12.08 K/UL (ref 1.8–8)
NEUTS SEG NFR BLD: 83.3 % (ref 32–75)
NRBC # BLD: 0 K/UL (ref 0–0.01)
NRBC BLD-RTO: 0 PER 100 WBC
PLATELET # BLD AUTO: 283 K/UL (ref 150–400)
PMV BLD AUTO: 9.1 FL (ref 8.9–12.9)
POTASSIUM SERPL-SCNC: 4.4 MMOL/L (ref 3.5–5.1)
PROT SERPL-MCNC: 6.8 G/DL (ref 6.4–8.2)
RBC # BLD AUTO: 3.15 M/UL (ref 3.8–5.2)
RBC MORPH BLD: ABNORMAL
SARS-COV-2 RNA RESP QL NAA+PROBE: NOT DETECTED
SODIUM SERPL-SCNC: 139 MMOL/L (ref 136–145)
SOURCE: NORMAL
SPECIMEN HOLD: NORMAL
TROPONIN I SERPL HS-MCNC: 64 NG/L (ref 0–51)
WBC # BLD AUTO: 14.5 K/UL (ref 3.6–11)

## 2025-07-18 PROCEDURE — 2500000003 HC RX 250 WO HCPCS: Performed by: EMERGENCY MEDICINE

## 2025-07-18 PROCEDURE — 85025 COMPLETE CBC W/AUTO DIFF WBC: CPT

## 2025-07-18 PROCEDURE — 6360000004 HC RX CONTRAST MEDICATION: Performed by: EMERGENCY MEDICINE

## 2025-07-18 PROCEDURE — 84484 ASSAY OF TROPONIN QUANT: CPT

## 2025-07-18 PROCEDURE — 83735 ASSAY OF MAGNESIUM: CPT

## 2025-07-18 PROCEDURE — 71275 CT ANGIOGRAPHY CHEST: CPT

## 2025-07-18 PROCEDURE — 36415 COLL VENOUS BLD VENIPUNCTURE: CPT

## 2025-07-18 PROCEDURE — 80053 COMPREHEN METABOLIC PANEL: CPT

## 2025-07-18 PROCEDURE — 93005 ELECTROCARDIOGRAM TRACING: CPT | Performed by: EMERGENCY MEDICINE

## 2025-07-18 PROCEDURE — 87040 BLOOD CULTURE FOR BACTERIA: CPT

## 2025-07-18 PROCEDURE — 84145 PROCALCITONIN (PCT): CPT

## 2025-07-18 PROCEDURE — 6360000002 HC RX W HCPCS: Performed by: EMERGENCY MEDICINE

## 2025-07-18 PROCEDURE — 83605 ASSAY OF LACTIC ACID: CPT

## 2025-07-18 PROCEDURE — 71045 X-RAY EXAM CHEST 1 VIEW: CPT

## 2025-07-18 PROCEDURE — 87636 SARSCOV2 & INF A&B AMP PRB: CPT

## 2025-07-18 PROCEDURE — 6370000000 HC RX 637 (ALT 250 FOR IP)

## 2025-07-18 PROCEDURE — 96375 TX/PRO/DX INJ NEW DRUG ADDON: CPT

## 2025-07-18 PROCEDURE — 99285 EMERGENCY DEPT VISIT HI MDM: CPT

## 2025-07-18 RX ORDER — ACETAMINOPHEN 500 MG
1000 TABLET ORAL
Status: COMPLETED | OUTPATIENT
Start: 2025-07-18 | End: 2025-07-18

## 2025-07-18 RX ORDER — IOPAMIDOL 755 MG/ML
100 INJECTION, SOLUTION INTRAVASCULAR
Status: COMPLETED | OUTPATIENT
Start: 2025-07-18 | End: 2025-07-18

## 2025-07-18 RX ADMIN — IOPAMIDOL 100 ML: 755 INJECTION, SOLUTION INTRAVENOUS at 23:22

## 2025-07-18 RX ADMIN — WATER 125 MG: 1 INJECTION INTRAMUSCULAR; INTRAVENOUS; SUBCUTANEOUS at 22:47

## 2025-07-18 RX ADMIN — ACETAMINOPHEN 1000 MG: 500 TABLET ORAL at 21:40

## 2025-07-18 ASSESSMENT — PAIN SCALES - GENERAL
PAINLEVEL_OUTOF10: 0
PAINLEVEL_OUTOF10: 0

## 2025-07-18 ASSESSMENT — PAIN - FUNCTIONAL ASSESSMENT: PAIN_FUNCTIONAL_ASSESSMENT: 0-10

## 2025-07-19 LAB
ANION GAP SERPL CALC-SCNC: 8 MMOL/L (ref 2–12)
B PERT DNA SPEC QL NAA+PROBE: NOT DETECTED
BASOPHILS # BLD: 0.02 K/UL (ref 0–0.1)
BASOPHILS NFR BLD: 0.2 % (ref 0–1)
BORDETELLA PARAPERTUSSIS BY PCR: NOT DETECTED
BUN SERPL-MCNC: 16 MG/DL (ref 6–20)
BUN/CREAT SERPL: 11 (ref 12–20)
C PNEUM DNA SPEC QL NAA+PROBE: NOT DETECTED
CALCIUM SERPL-MCNC: 8.5 MG/DL (ref 8.5–10.1)
CHLORIDE SERPL-SCNC: 106 MMOL/L (ref 97–108)
CO2 SERPL-SCNC: 21 MMOL/L (ref 21–32)
CREAT SERPL-MCNC: 1.41 MG/DL (ref 0.55–1.02)
DIFFERENTIAL METHOD BLD: ABNORMAL
EKG ATRIAL RATE: 86 BPM
EKG DIAGNOSIS: NORMAL
EKG P AXIS: 88 DEGREES
EKG P-R INTERVAL: 144 MS
EKG Q-T INTERVAL: 334 MS
EKG QRS DURATION: 60 MS
EKG QTC CALCULATION (BAZETT): 399 MS
EKG R AXIS: 92 DEGREES
EKG T AXIS: 40 DEGREES
EKG VENTRICULAR RATE: 86 BPM
EOSINOPHIL # BLD: 0.01 K/UL (ref 0–0.4)
EOSINOPHIL NFR BLD: 0.1 % (ref 0–7)
ERYTHROCYTE [DISTWIDTH] IN BLOOD BY AUTOMATED COUNT: 18.3 % (ref 11.5–14.5)
FLUAV SUBTYP SPEC NAA+PROBE: NOT DETECTED
FLUBV RNA SPEC QL NAA+PROBE: NOT DETECTED
GLUCOSE SERPL-MCNC: 158 MG/DL (ref 65–100)
HADV DNA SPEC QL NAA+PROBE: NOT DETECTED
HCOV 229E RNA SPEC QL NAA+PROBE: NOT DETECTED
HCOV HKU1 RNA SPEC QL NAA+PROBE: NOT DETECTED
HCOV NL63 RNA SPEC QL NAA+PROBE: NOT DETECTED
HCOV OC43 RNA SPEC QL NAA+PROBE: NOT DETECTED
HCT VFR BLD AUTO: 27.4 % (ref 35–47)
HGB BLD-MCNC: 8.9 G/DL (ref 11.5–16)
HMPV RNA SPEC QL NAA+PROBE: NOT DETECTED
HPIV1 RNA SPEC QL NAA+PROBE: NOT DETECTED
HPIV2 RNA SPEC QL NAA+PROBE: NOT DETECTED
HPIV3 RNA SPEC QL NAA+PROBE: NOT DETECTED
HPIV4 RNA SPEC QL NAA+PROBE: NOT DETECTED
IMM GRANULOCYTES # BLD AUTO: 0.09 K/UL (ref 0–0.04)
IMM GRANULOCYTES NFR BLD AUTO: 0.8 % (ref 0–0.5)
IRON SATN MFR SERPL: 4 % (ref 20–50)
IRON SERPL-MCNC: 11 UG/DL (ref 35–150)
LYMPHOCYTES # BLD: 0.37 K/UL (ref 0.8–3.5)
LYMPHOCYTES NFR BLD: 3.4 % (ref 12–49)
M PNEUMO DNA SPEC QL NAA+PROBE: NOT DETECTED
MCH RBC QN AUTO: 27.8 PG (ref 26–34)
MCHC RBC AUTO-ENTMCNC: 32.5 G/DL (ref 30–36.5)
MCV RBC AUTO: 85.6 FL (ref 80–99)
MONOCYTES # BLD: 0.2 K/UL (ref 0–1)
MONOCYTES NFR BLD: 1.8 % (ref 5–13)
NEUTS SEG # BLD: 10.31 K/UL (ref 1.8–8)
NEUTS SEG NFR BLD: 93.7 % (ref 32–75)
NRBC # BLD: 0 K/UL (ref 0–0.01)
NRBC BLD-RTO: 0 PER 100 WBC
PLATELET # BLD AUTO: 274 K/UL (ref 150–400)
PMV BLD AUTO: 9.3 FL (ref 8.9–12.9)
POTASSIUM SERPL-SCNC: 4.4 MMOL/L (ref 3.5–5.1)
PROCALCITONIN SERPL-MCNC: 0.16 NG/ML
RBC # BLD AUTO: 3.2 M/UL (ref 3.8–5.2)
RBC MORPH BLD: ABNORMAL
RETICS # AUTO: 0.04 M/UL (ref 0.02–0.08)
RETICS/RBC NFR AUTO: 1.2 % (ref 0.7–2.1)
RSV RNA SPEC QL NAA+PROBE: NOT DETECTED
RV+EV RNA SPEC QL NAA+PROBE: NOT DETECTED
SARS-COV-2 RNA RESP QL NAA+PROBE: NOT DETECTED
SODIUM SERPL-SCNC: 135 MMOL/L (ref 136–145)
TIBC SERPL-MCNC: 292 UG/DL (ref 250–450)
TROPONIN I SERPL HS-MCNC: 73 NG/L (ref 0–51)
WBC # BLD AUTO: 11 K/UL (ref 3.6–11)

## 2025-07-19 PROCEDURE — 96375 TX/PRO/DX INJ NEW DRUG ADDON: CPT

## 2025-07-19 PROCEDURE — 94761 N-INVAS EAR/PLS OXIMETRY MLT: CPT

## 2025-07-19 PROCEDURE — 6360000002 HC RX W HCPCS: Performed by: EMERGENCY MEDICINE

## 2025-07-19 PROCEDURE — 82607 VITAMIN B-12: CPT

## 2025-07-19 PROCEDURE — 1100000000 HC RM PRIVATE

## 2025-07-19 PROCEDURE — 6370000000 HC RX 637 (ALT 250 FOR IP): Performed by: STUDENT IN AN ORGANIZED HEALTH CARE EDUCATION/TRAINING PROGRAM

## 2025-07-19 PROCEDURE — 80048 BASIC METABOLIC PNL TOTAL CA: CPT

## 2025-07-19 PROCEDURE — 2500000003 HC RX 250 WO HCPCS: Performed by: EMERGENCY MEDICINE

## 2025-07-19 PROCEDURE — 2580000003 HC RX 258: Performed by: EMERGENCY MEDICINE

## 2025-07-19 PROCEDURE — 0202U NFCT DS 22 TRGT SARS-COV-2: CPT

## 2025-07-19 PROCEDURE — 6360000002 HC RX W HCPCS: Performed by: STUDENT IN AN ORGANIZED HEALTH CARE EDUCATION/TRAINING PROGRAM

## 2025-07-19 PROCEDURE — 6370000000 HC RX 637 (ALT 250 FOR IP): Performed by: INTERNAL MEDICINE

## 2025-07-19 PROCEDURE — 96365 THER/PROPH/DIAG IV INF INIT: CPT

## 2025-07-19 PROCEDURE — 83540 ASSAY OF IRON: CPT

## 2025-07-19 PROCEDURE — 85025 COMPLETE CBC W/AUTO DIFF WBC: CPT

## 2025-07-19 PROCEDURE — 2580000003 HC RX 258: Performed by: STUDENT IN AN ORGANIZED HEALTH CARE EDUCATION/TRAINING PROGRAM

## 2025-07-19 PROCEDURE — 36415 COLL VENOUS BLD VENIPUNCTURE: CPT

## 2025-07-19 PROCEDURE — 85045 AUTOMATED RETICULOCYTE COUNT: CPT

## 2025-07-19 PROCEDURE — 94640 AIRWAY INHALATION TREATMENT: CPT

## 2025-07-19 PROCEDURE — 84484 ASSAY OF TROPONIN QUANT: CPT

## 2025-07-19 PROCEDURE — 2500000003 HC RX 250 WO HCPCS: Performed by: STUDENT IN AN ORGANIZED HEALTH CARE EDUCATION/TRAINING PROGRAM

## 2025-07-19 PROCEDURE — 83550 IRON BINDING TEST: CPT

## 2025-07-19 RX ORDER — ACETAMINOPHEN 650 MG/1
650 SUPPOSITORY RECTAL EVERY 6 HOURS PRN
Status: DISCONTINUED | OUTPATIENT
Start: 2025-07-19 | End: 2025-07-21 | Stop reason: HOSPADM

## 2025-07-19 RX ORDER — ACETAMINOPHEN 325 MG/1
650 TABLET ORAL EVERY 6 HOURS PRN
Status: DISCONTINUED | OUTPATIENT
Start: 2025-07-19 | End: 2025-07-21 | Stop reason: HOSPADM

## 2025-07-19 RX ORDER — PANTOPRAZOLE SODIUM 40 MG/1
40 TABLET, DELAYED RELEASE ORAL
Status: DISCONTINUED | OUTPATIENT
Start: 2025-07-19 | End: 2025-07-21 | Stop reason: HOSPADM

## 2025-07-19 RX ORDER — ARFORMOTEROL TARTRATE 15 UG/2ML
15 SOLUTION RESPIRATORY (INHALATION)
Status: DISCONTINUED | OUTPATIENT
Start: 2025-07-19 | End: 2025-07-21 | Stop reason: HOSPADM

## 2025-07-19 RX ORDER — SODIUM CHLORIDE 9 MG/ML
INJECTION, SOLUTION INTRAVENOUS PRN
Status: DISCONTINUED | OUTPATIENT
Start: 2025-07-19 | End: 2025-07-21 | Stop reason: HOSPADM

## 2025-07-19 RX ORDER — 0.9 % SODIUM CHLORIDE 0.9 %
500 INTRAVENOUS SOLUTION INTRAVENOUS ONCE
Status: COMPLETED | OUTPATIENT
Start: 2025-07-19 | End: 2025-07-19

## 2025-07-19 RX ORDER — ENOXAPARIN SODIUM 100 MG/ML
30 INJECTION SUBCUTANEOUS DAILY
Status: DISCONTINUED | OUTPATIENT
Start: 2025-07-19 | End: 2025-07-20 | Stop reason: CLARIF

## 2025-07-19 RX ORDER — BUDESONIDE 0.5 MG/2ML
1 INHALANT ORAL
Status: DISCONTINUED | OUTPATIENT
Start: 2025-07-19 | End: 2025-07-21 | Stop reason: HOSPADM

## 2025-07-19 RX ORDER — PREDNISONE 20 MG/1
40 TABLET ORAL DAILY
Status: DISCONTINUED | OUTPATIENT
Start: 2025-07-19 | End: 2025-07-21 | Stop reason: HOSPADM

## 2025-07-19 RX ORDER — SODIUM CHLORIDE 0.9 % (FLUSH) 0.9 %
5-40 SYRINGE (ML) INJECTION PRN
Status: DISCONTINUED | OUTPATIENT
Start: 2025-07-19 | End: 2025-07-21 | Stop reason: HOSPADM

## 2025-07-19 RX ORDER — AZITHROMYCIN 250 MG/1
250 TABLET, FILM COATED ORAL DAILY
Status: DISCONTINUED | OUTPATIENT
Start: 2025-07-19 | End: 2025-07-21 | Stop reason: HOSPADM

## 2025-07-19 RX ORDER — ONDANSETRON 2 MG/ML
4 INJECTION INTRAMUSCULAR; INTRAVENOUS EVERY 6 HOURS PRN
Status: DISCONTINUED | OUTPATIENT
Start: 2025-07-19 | End: 2025-07-21 | Stop reason: HOSPADM

## 2025-07-19 RX ORDER — GUAIFENESIN 600 MG/1
600 TABLET, EXTENDED RELEASE ORAL 2 TIMES DAILY
Status: DISCONTINUED | OUTPATIENT
Start: 2025-07-19 | End: 2025-07-21 | Stop reason: HOSPADM

## 2025-07-19 RX ORDER — POLYETHYLENE GLYCOL 3350 17 G/17G
17 POWDER, FOR SOLUTION ORAL DAILY PRN
Status: DISCONTINUED | OUTPATIENT
Start: 2025-07-19 | End: 2025-07-21 | Stop reason: HOSPADM

## 2025-07-19 RX ORDER — IPRATROPIUM BROMIDE AND ALBUTEROL SULFATE 2.5; .5 MG/3ML; MG/3ML
1 SOLUTION RESPIRATORY (INHALATION) EVERY 4 HOURS PRN
Status: DISCONTINUED | OUTPATIENT
Start: 2025-07-19 | End: 2025-07-21 | Stop reason: HOSPADM

## 2025-07-19 RX ORDER — SODIUM CHLORIDE 0.9 % (FLUSH) 0.9 %
5-40 SYRINGE (ML) INJECTION EVERY 12 HOURS SCHEDULED
Status: DISCONTINUED | OUTPATIENT
Start: 2025-07-19 | End: 2025-07-21 | Stop reason: HOSPADM

## 2025-07-19 RX ORDER — IPRATROPIUM BROMIDE AND ALBUTEROL SULFATE 2.5; .5 MG/3ML; MG/3ML
1 SOLUTION RESPIRATORY (INHALATION)
Status: DISCONTINUED | OUTPATIENT
Start: 2025-07-19 | End: 2025-07-21 | Stop reason: HOSPADM

## 2025-07-19 RX ORDER — ONDANSETRON 4 MG/1
4 TABLET, ORALLY DISINTEGRATING ORAL EVERY 8 HOURS PRN
Status: DISCONTINUED | OUTPATIENT
Start: 2025-07-19 | End: 2025-07-21 | Stop reason: HOSPADM

## 2025-07-19 RX ORDER — IPRATROPIUM BROMIDE AND ALBUTEROL SULFATE 2.5; .5 MG/3ML; MG/3ML
1 SOLUTION RESPIRATORY (INHALATION)
Status: DISCONTINUED | OUTPATIENT
Start: 2025-07-19 | End: 2025-07-19

## 2025-07-19 RX ORDER — AMLODIPINE BESYLATE 5 MG/1
10 TABLET ORAL DAILY
Status: DISCONTINUED | OUTPATIENT
Start: 2025-07-19 | End: 2025-07-21 | Stop reason: HOSPADM

## 2025-07-19 RX ADMIN — AZITHROMYCIN MONOHYDRATE 500 MG: 500 INJECTION, POWDER, LYOPHILIZED, FOR SOLUTION INTRAVENOUS at 01:07

## 2025-07-19 RX ADMIN — IPRATROPIUM BROMIDE AND ALBUTEROL SULFATE 1 DOSE: .5; 3 SOLUTION RESPIRATORY (INHALATION) at 13:48

## 2025-07-19 RX ADMIN — SODIUM CHLORIDE 500 ML: 0.9 INJECTION, SOLUTION INTRAVENOUS at 04:23

## 2025-07-19 RX ADMIN — PREDNISONE 40 MG: 20 TABLET ORAL at 09:11

## 2025-07-19 RX ADMIN — ARFORMOTEROL TARTRATE 15 MCG: 15 SOLUTION RESPIRATORY (INHALATION) at 08:04

## 2025-07-19 RX ADMIN — AZITHROMYCIN 250 MG: 250 TABLET, FILM COATED ORAL at 09:12

## 2025-07-19 RX ADMIN — GUAIFENESIN 600 MG: 600 TABLET, EXTENDED RELEASE ORAL at 21:36

## 2025-07-19 RX ADMIN — BUDESONIDE 1000 MCG: 0.5 INHALANT RESPIRATORY (INHALATION) at 08:04

## 2025-07-19 RX ADMIN — IPRATROPIUM BROMIDE AND ALBUTEROL SULFATE 1 DOSE: .5; 3 SOLUTION RESPIRATORY (INHALATION) at 18:28

## 2025-07-19 RX ADMIN — AMLODIPINE BESYLATE 10 MG: 5 TABLET ORAL at 09:12

## 2025-07-19 RX ADMIN — GUAIFENESIN 600 MG: 600 TABLET, EXTENDED RELEASE ORAL at 09:12

## 2025-07-19 RX ADMIN — ARFORMOTEROL TARTRATE 15 MCG: 15 SOLUTION RESPIRATORY (INHALATION) at 18:28

## 2025-07-19 RX ADMIN — BUDESONIDE 1000 MCG: 0.5 INHALANT RESPIRATORY (INHALATION) at 18:28

## 2025-07-19 RX ADMIN — SODIUM CHLORIDE, PRESERVATIVE FREE 10 ML: 5 INJECTION INTRAVENOUS at 09:13

## 2025-07-19 RX ADMIN — WATER 1000 MG: 1 INJECTION INTRAMUSCULAR; INTRAVENOUS; SUBCUTANEOUS at 01:03

## 2025-07-19 RX ADMIN — IPRATROPIUM BROMIDE AND ALBUTEROL SULFATE 1 DOSE: .5; 3 SOLUTION RESPIRATORY (INHALATION) at 08:04

## 2025-07-19 RX ADMIN — SODIUM CHLORIDE, PRESERVATIVE FREE 10 ML: 5 INJECTION INTRAVENOUS at 21:00

## 2025-07-19 ASSESSMENT — PAIN SCALES - GENERAL
PAINLEVEL_OUTOF10: 0

## 2025-07-19 NOTE — ED PROVIDER NOTES
[facility] EMERGENCY DEPARTMENT  EMERGENCY DEPARTMENT ENCOUNTER       Pt Name: Katie Wu  MRN: [id number]  Birthdate 1950  Date of evaluation: 7/18/2025  Provider: Aubrey Dhaliwal DO   PCP: Henry Amaro MD  Note Started: 1:23 AM EDT 7/19/25     CHIEF COMPLAINT       Chief Complaint   Patient presents with   • Shortness of Breath     Pt to ED with SOB starting this morning; cough for 3d. Hx COPD, does not wear O2 at baseline. 84% on RA on arrival to ED, 76% for EMS. 2L O2 by nasal cannula brought pt to 90%. Pt notes baseline O2 is normally 90-95%.        HISTORY OF PRESENT ILLNESS: 1 or more elements      History From: Patient, History limited by: none     Katie Wu is a 75 y.o. female presents to the emergency department by EMS for evaluation of shortness of breath.       Please See MDM for Additional Details of the HPI/PMH  Nursing Notes were all reviewed and agreed with or any disagreements were addressed in the HPI.     REVIEW OF SYSTEMS        Positives and Pertinent negatives as per HPI.    PAST HISTORY     Past Medical History:  Past Medical History:   Diagnosis Date   • COPD (chronic obstructive pulmonary disease) (HCC)    • Emphysema of lung (HCC)    • Hypertension About  5 yrs.ago       Past Surgical History:  Past Surgical History:   Procedure Laterality Date   • BREAST BIOPSY Right     Benign. Patient thinks biopsy was on rt breast lower areolar area. Along time ago.   • COLONOSCOPY N/A 5/23/2016    COLONOSCOPY performed by Tim Miranda MD at [facility] ENDOSCOPY   • GI      colonscopy - about 2011   • GYN      hysterectomy   • HYSTERECTOMY (CERVIX STATUS UNKNOWN)     • HYSTERECTOMY, TOTAL ABDOMINAL (CERVIX REMOVED)     • ORTHOPEDIC SURGERY      surgery for broken foot - right       Family History:  Family History   Problem Relation Age of Onset   • Cancer Sister 45        colon ca       Social History:  Social History     Tobacco Use   • Smoking status: Former     Current packs/day: 0.00

## 2025-07-19 NOTE — PLAN OF CARE
Problem: Discharge Planning  Goal: Discharge to home or other facility with appropriate resources  7/19/2025 1040 by Love Rodriguez, RN  Outcome: Progressing  Flowsheets (Taken 7/19/2025 0427 by Pelon Liz, RN)  Discharge to home or other facility with appropriate resources:   Identify barriers to discharge with patient and caregiver   Arrange for needed discharge resources and transportation as appropriate   Identify discharge learning needs (meds, wound care, etc)  7/19/2025 0350 by Pelon Liz, RN  Outcome: Progressing     Problem: Pain  Goal: Verbalizes/displays adequate comfort level or baseline comfort level  Outcome: Progressing     Problem: Respiratory - Adult  Goal: Achieves optimal ventilation and oxygenation  7/19/2025 1040 by Love Rodriguez, RN  Outcome: Progressing  7/19/2025 0901 by Christina Will, RT  Outcome: Progressing

## 2025-07-19 NOTE — RT PROTOCOL NOTE
ADULT PROTOCOL: JET AEROSOL ASSESSMENT    Patient  Katie Wu     75 y.o.   female     7/19/2025  9:16 AM    Breath Sounds Pre Procedure: Breath Sounds Pre-Tx BENITO: Clear                                  Breath Sounds Pre-Tx LLL: Clear        Breath Sounds Pre-Tx RUL: Clear        Breath Sounds Pre-Tx RML: Clear        Breath Sounds Pre-Tx RLL: Clear  Breath Sounds Post Procedure: Breath Sounds Post-Tx BENITO: Clear          Breath Sounds Post-Tx LLL: Clear          Breath Sounds Post-Tx RUL: Clear          Breath Sounds Post-Tx RML: Clear          Breath Sounds Post-Tx RLL: Clear                                     Breathing pattern: Pre procedure   Regular          Post procedure    Regular    Heart Rate: Pre procedure Pre-Tx Pulse: 79           Post procedure Post-Tx Pulse: 85    Resp Rate: Pre procedure Pre-Tx Resps: 18           Post procedure Post-Tx Resps: 20      Oxygen: O2 Therapy: Oxygen   2L NC       SpO2:  SpO2: 95 %                   Nebulizer Therapy: Current medications Medications: Arformoterol, Budesonide, Duoneb      Changed:  Yes to Tid Duoneb    Smoking History:  Former    Problem List:   Patient Active Problem List   Diagnosis    Mild reactive airways disease    Anemia    Lymphadenopathy of head and neck    Essential hypertension    Panlobular emphysema (HCC)    Nodular goiter    COVID-19    Dyspnea on exertion    G6PD deficiency    Chronic renal disease, stage III (HCC)    Prerenal azotemia    Cataract of both eyes    COPD exacerbation (HCC)       Respiratory Therapist: RT CHRISTELLE

## 2025-07-19 NOTE — H&P
Hospitalist Admission Note    NAME:  Katie Wu   :  1950   MRN:  [id number]     Date/Time:  2025 2:48 AM    Patient PCP: Henry Amaro MD    ______________________________________________________________________  Given the patient's current clinical presentation, I have a high level of concern for decompensation if discharged from the emergency department.  Complex decision making was performed, which includes reviewing the patient's available past medical records, laboratory results, and x-ray films.       My assessment of this patient's clinical condition and my plan of care is as follows.    Assessment / Plan:    Active Problems:  Acute hypoxemic respiratory failure  COPD with acute exacerbation  Former smoker  MICKEY  Acute on chronic normocytic anemia  Essential hypertension  GERD    Plan:  Acute hypoxemic respiratory failure  COPD with acute exacerbation  Former smoker  Admit to telemetry monitoring  Continuous pulse oximetry and supplemental oxygen as needed maintain saturations greater than 90%  5-day prednisone course  5-day azithromycin course  DuoNebs 4 times daily with every 4 hours as needed breakthrough nebs  Guaifenesin  Aggressive pulmonary toilet  No clear etiology for fever, will monitor for now with azithromycin monotherapy for coverage.  To low threshold for escalation.    MICKEY  500 cc normal saline bolus now  Trend renal function  Renally dose medications and avoid nephrotoxic agents    Acute on chronic normocytic anemia  Trend hemoglobin  Anemia studies: Iron panel, B12 with reflex, reticulocytes    Essential hypertension  Continue PTA amlodipine    GERD  Continue PTA Protonix    Medical Decision Making:   I personally reviewed labs: Yes, as listed below  I personally reviewed imaging: CTA chest, CXR  Toxic drug monitoring: None  Discussed case with: ED provider. After discussion I am in agreement that acuity of patient's medical condition necessitates hospital stay.    Code

## 2025-07-20 LAB
ANION GAP SERPL CALC-SCNC: 7 MMOL/L (ref 2–12)
BUN SERPL-MCNC: 19 MG/DL (ref 6–20)
BUN/CREAT SERPL: 14 (ref 12–20)
CALCIUM SERPL-MCNC: 9 MG/DL (ref 8.5–10.1)
CHLORIDE SERPL-SCNC: 108 MMOL/L (ref 97–108)
CO2 SERPL-SCNC: 22 MMOL/L (ref 21–32)
CREAT SERPL-MCNC: 1.39 MG/DL (ref 0.55–1.02)
EKG ATRIAL RATE: 101 BPM
EKG DIAGNOSIS: NORMAL
EKG P AXIS: 73 DEGREES
EKG P-R INTERVAL: 138 MS
EKG Q-T INTERVAL: 320 MS
EKG QRS DURATION: 64 MS
EKG QTC CALCULATION (BAZETT): 414 MS
EKG R AXIS: 93 DEGREES
EKG T AXIS: 67 DEGREES
EKG VENTRICULAR RATE: 101 BPM
ERYTHROCYTE [DISTWIDTH] IN BLOOD BY AUTOMATED COUNT: 18.3 % (ref 11.5–14.5)
FERRITIN SERPL-MCNC: 70 NG/ML (ref 26–388)
GLUCOSE SERPL-MCNC: 146 MG/DL (ref 65–100)
HCT VFR BLD AUTO: 28.9 % (ref 35–47)
HEMOCCULT STL QL: NEGATIVE
HGB BLD-MCNC: 9.2 G/DL (ref 11.5–16)
MCH RBC QN AUTO: 27.1 PG (ref 26–34)
MCHC RBC AUTO-ENTMCNC: 31.8 G/DL (ref 30–36.5)
MCV RBC AUTO: 85.3 FL (ref 80–99)
NRBC # BLD: 0 K/UL (ref 0–0.01)
NRBC BLD-RTO: 0 PER 100 WBC
PLATELET # BLD AUTO: 331 K/UL (ref 150–400)
PMV BLD AUTO: 9.5 FL (ref 8.9–12.9)
POTASSIUM SERPL-SCNC: 4.6 MMOL/L (ref 3.5–5.1)
RBC # BLD AUTO: 3.39 M/UL (ref 3.8–5.2)
SODIUM SERPL-SCNC: 137 MMOL/L (ref 136–145)
WBC # BLD AUTO: 18.8 K/UL (ref 3.6–11)

## 2025-07-20 PROCEDURE — 2700000000 HC OXYGEN THERAPY PER DAY

## 2025-07-20 PROCEDURE — 94761 N-INVAS EAR/PLS OXIMETRY MLT: CPT

## 2025-07-20 PROCEDURE — 82272 OCCULT BLD FECES 1-3 TESTS: CPT

## 2025-07-20 PROCEDURE — 80048 BASIC METABOLIC PNL TOTAL CA: CPT

## 2025-07-20 PROCEDURE — 6370000000 HC RX 637 (ALT 250 FOR IP): Performed by: INTERNAL MEDICINE

## 2025-07-20 PROCEDURE — 82728 ASSAY OF FERRITIN: CPT

## 2025-07-20 PROCEDURE — 6360000002 HC RX W HCPCS: Performed by: PHYSICIAN ASSISTANT

## 2025-07-20 PROCEDURE — 1100000000 HC RM PRIVATE

## 2025-07-20 PROCEDURE — 85027 COMPLETE CBC AUTOMATED: CPT

## 2025-07-20 PROCEDURE — 2500000003 HC RX 250 WO HCPCS: Performed by: STUDENT IN AN ORGANIZED HEALTH CARE EDUCATION/TRAINING PROGRAM

## 2025-07-20 PROCEDURE — 94760 N-INVAS EAR/PLS OXIMETRY 1: CPT

## 2025-07-20 PROCEDURE — 36415 COLL VENOUS BLD VENIPUNCTURE: CPT

## 2025-07-20 PROCEDURE — 94640 AIRWAY INHALATION TREATMENT: CPT

## 2025-07-20 PROCEDURE — 6360000002 HC RX W HCPCS: Performed by: STUDENT IN AN ORGANIZED HEALTH CARE EDUCATION/TRAINING PROGRAM

## 2025-07-20 PROCEDURE — 6370000000 HC RX 637 (ALT 250 FOR IP): Performed by: STUDENT IN AN ORGANIZED HEALTH CARE EDUCATION/TRAINING PROGRAM

## 2025-07-20 RX ORDER — SODIUM FERRIC GLUCONATE COMPLEX IN SUCROSE 12.5 MG/ML
125 INJECTION INTRAVENOUS DAILY
Status: DISCONTINUED | OUTPATIENT
Start: 2025-07-20 | End: 2025-07-20

## 2025-07-20 RX ORDER — HEPARIN SODIUM 5000 [USP'U]/ML
5000 INJECTION, SOLUTION INTRAVENOUS; SUBCUTANEOUS 2 TIMES DAILY
Status: DISCONTINUED | OUTPATIENT
Start: 2025-07-20 | End: 2025-07-21 | Stop reason: HOSPADM

## 2025-07-20 RX ADMIN — BUDESONIDE 1000 MCG: 0.5 INHALANT RESPIRATORY (INHALATION) at 08:42

## 2025-07-20 RX ADMIN — AMLODIPINE BESYLATE 10 MG: 5 TABLET ORAL at 08:29

## 2025-07-20 RX ADMIN — AZITHROMYCIN 250 MG: 250 TABLET, FILM COATED ORAL at 08:29

## 2025-07-20 RX ADMIN — SODIUM CHLORIDE, PRESERVATIVE FREE 10 ML: 5 INJECTION INTRAVENOUS at 08:30

## 2025-07-20 RX ADMIN — IPRATROPIUM BROMIDE AND ALBUTEROL SULFATE 1 DOSE: .5; 3 SOLUTION RESPIRATORY (INHALATION) at 18:00

## 2025-07-20 RX ADMIN — BUDESONIDE 1000 MCG: 0.5 INHALANT RESPIRATORY (INHALATION) at 18:00

## 2025-07-20 RX ADMIN — IPRATROPIUM BROMIDE AND ALBUTEROL SULFATE 1 DOSE: .5; 3 SOLUTION RESPIRATORY (INHALATION) at 08:43

## 2025-07-20 RX ADMIN — PREDNISONE 40 MG: 20 TABLET ORAL at 08:29

## 2025-07-20 RX ADMIN — ARFORMOTEROL TARTRATE 15 MCG: 15 SOLUTION RESPIRATORY (INHALATION) at 18:00

## 2025-07-20 RX ADMIN — PANTOPRAZOLE SODIUM 40 MG: 40 TABLET, DELAYED RELEASE ORAL at 06:42

## 2025-07-20 RX ADMIN — GUAIFENESIN 600 MG: 600 TABLET, EXTENDED RELEASE ORAL at 08:29

## 2025-07-20 RX ADMIN — IPRATROPIUM BROMIDE AND ALBUTEROL SULFATE 1 DOSE: .5; 3 SOLUTION RESPIRATORY (INHALATION) at 14:08

## 2025-07-20 RX ADMIN — GUAIFENESIN 600 MG: 600 TABLET, EXTENDED RELEASE ORAL at 21:13

## 2025-07-20 RX ADMIN — ARFORMOTEROL TARTRATE 15 MCG: 15 SOLUTION RESPIRATORY (INHALATION) at 08:43

## 2025-07-20 RX ADMIN — SODIUM CHLORIDE, PRESERVATIVE FREE 10 ML: 5 INJECTION INTRAVENOUS at 21:13

## 2025-07-20 ASSESSMENT — PAIN SCALES - GENERAL
PAINLEVEL_OUTOF10: 0
PAINLEVEL_OUTOF10: 0

## 2025-07-20 NOTE — PLAN OF CARE
Problem: Discharge Planning  Goal: Discharge to home or other facility with appropriate resources  7/20/2025 0936 by Love Rodriguez RN  Outcome: Progressing  7/20/2025 0526 by Yohana Webster RN  Outcome: Progressing  Flowsheets (Taken 7/19/2025 0427 by Pelon Liz, RN)  Discharge to home or other facility with appropriate resources:   Identify barriers to discharge with patient and caregiver   Arrange for needed discharge resources and transportation as appropriate   Identify discharge learning needs (meds, wound care, etc)     Problem: Pain  Goal: Verbalizes/displays adequate comfort level or baseline comfort level  7/20/2025 0936 by Love Rodriguez RN  Outcome: Progressing  7/20/2025 0526 by Yohana Webster RN  Outcome: Progressing  Flowsheets (Taken 7/20/2025 0526)  Verbalizes/displays adequate comfort level or baseline comfort level:   Encourage patient to monitor pain and request assistance   Assess pain using appropriate pain scale   Administer analgesics based on type and severity of pain and evaluate response   Implement non-pharmacological measures as appropriate and evaluate response   Consider cultural and social influences on pain and pain management   Notify Licensed Independent Practitioner if interventions unsuccessful or patient reports new pain     Problem: Respiratory - Adult  Goal: Achieves optimal ventilation and oxygenation  7/20/2025 0936 by Love Rodriguez RN  Outcome: Progressing  7/20/2025 0911 by Christina Will RT  Outcome: Progressing  7/20/2025 0526 by Yohana Webster RN  Outcome: Progressing  Flowsheets (Taken 7/20/2025 0526)  Achieves optimal ventilation and oxygenation:   Assess for changes in respiratory status   Encourage broncho-pulmonary hygiene including cough, deep breathe, incentive spirometry   Assess for changes in mentation and behavior   Position to facilitate oxygenation and minimize respiratory effort   Respiratory therapy support as indicated

## 2025-07-20 NOTE — PLAN OF CARE
Problem: Discharge Planning  Goal: Discharge to home or other facility with appropriate resources  Outcome: Progressing  Flowsheets (Taken 7/19/2025 0427 by Pelon Liz, RN)  Discharge to home or other facility with appropriate resources:   Identify barriers to discharge with patient and caregiver   Arrange for needed discharge resources and transportation as appropriate   Identify discharge learning needs (meds, wound care, etc)     Problem: Pain  Goal: Verbalizes/displays adequate comfort level or baseline comfort level  Outcome: Progressing  Flowsheets (Taken 7/20/2025 0526)  Verbalizes/displays adequate comfort level or baseline comfort level:   Encourage patient to monitor pain and request assistance   Assess pain using appropriate pain scale   Administer analgesics based on type and severity of pain and evaluate response   Implement non-pharmacological measures as appropriate and evaluate response   Consider cultural and social influences on pain and pain management   Notify Licensed Independent Practitioner if interventions unsuccessful or patient reports new pain     Problem: Respiratory - Adult  Goal: Achieves optimal ventilation and oxygenation  Outcome: Progressing  Flowsheets (Taken 7/20/2025 0526)  Achieves optimal ventilation and oxygenation:   Assess for changes in respiratory status   Encourage broncho-pulmonary hygiene including cough, deep breathe, incentive spirometry   Assess for changes in mentation and behavior   Position to facilitate oxygenation and minimize respiratory effort   Respiratory therapy support as indicated     Problem: Skin/Tissue Integrity - Adult  Goal: Skin integrity remains intact  Outcome: Progressing  Flowsheets (Taken 7/20/2025 0526)  Skin Integrity Remains Intact:   Monitor for areas of redness and/or skin breakdown   Positioning devices  Goal: Oral mucous membranes remain intact  Outcome: Progressing  Flowsheets (Taken 7/20/2025 0526)  Oral Mucous Membranes Remain

## 2025-07-20 NOTE — RT PROTOCOL NOTE
ADULT PROTOCOL: JET AEROSOL ASSESSMENT    Patient  Katie Wu     75 y.o.   female     7/20/2025  11:15 AM    Breath Sounds Pre Procedure: Breath Sounds Pre-Tx BENITO: Clear                                  Breath Sounds Pre-Tx LLL: Clear        Breath Sounds Pre-Tx RUL: Clear        Breath Sounds Pre-Tx RML: Clear        Breath Sounds Pre-Tx RLL: Clear  Breath Sounds Post Procedure: Breath Sounds Post-Tx BENITO: Clear          Breath Sounds Post-Tx LLL: Clear          Breath Sounds Post-Tx RUL: Clear          Breath Sounds Post-Tx RML: Clear          Breath Sounds Post-Tx RLL: Clear                                     Breathing pattern: Pre procedure   Regular          Post procedure    Regular    Heart Rate: Pre procedure Pre-Tx Pulse: 97           Post procedure Post-Tx Pulse: 95    Resp Rate: Pre procedure Pre-Tx Resps: 18           Post procedure Post-Tx Resps: 18      Oxygen: O2 Therapy: Room air            SpO2:  SpO2: 92 %                   Nebulizer Therapy: Current medications Medications: Albuterol/Ipratropium Tid      Changed:  No    Smoking History:   Former    Problem List:   Patient Active Problem List   Diagnosis    Mild reactive airways disease    Anemia    Lymphadenopathy of head and neck    Essential hypertension    Panlobular emphysema (HCC)    Nodular goiter    COVID-19    Dyspnea on exertion    G6PD deficiency    Chronic renal disease, stage III (HCC)    Prerenal azotemia    Cataract of both eyes    COPD exacerbation (HCC)       Respiratory Therapist: HENRRY MELO RT

## 2025-07-21 VITALS
HEIGHT: 63 IN | DIASTOLIC BLOOD PRESSURE: 78 MMHG | HEART RATE: 104 BPM | OXYGEN SATURATION: 91 % | RESPIRATION RATE: 18 BRPM | SYSTOLIC BLOOD PRESSURE: 149 MMHG | WEIGHT: 108.69 LBS | TEMPERATURE: 97.5 F | BODY MASS INDEX: 19.26 KG/M2

## 2025-07-21 LAB
ANION GAP SERPL CALC-SCNC: 4 MMOL/L (ref 2–12)
BUN SERPL-MCNC: 24 MG/DL (ref 6–20)
BUN/CREAT SERPL: 16 (ref 12–20)
CALCIUM SERPL-MCNC: 9 MG/DL (ref 8.5–10.1)
CHLORIDE SERPL-SCNC: 110 MMOL/L (ref 97–108)
CO2 SERPL-SCNC: 25 MMOL/L (ref 21–32)
CREAT SERPL-MCNC: 1.46 MG/DL (ref 0.55–1.02)
ERYTHROCYTE [DISTWIDTH] IN BLOOD BY AUTOMATED COUNT: 18.4 % (ref 11.5–14.5)
GLUCOSE SERPL-MCNC: 107 MG/DL (ref 65–100)
HCT VFR BLD AUTO: 25.4 % (ref 35–47)
HGB BLD-MCNC: 8 G/DL (ref 11.5–16)
MCH RBC QN AUTO: 27.2 PG (ref 26–34)
MCHC RBC AUTO-ENTMCNC: 31.5 G/DL (ref 30–36.5)
MCV RBC AUTO: 86.4 FL (ref 80–99)
NRBC # BLD: 0 K/UL (ref 0–0.01)
NRBC BLD-RTO: 0 PER 100 WBC
PLATELET # BLD AUTO: 277 K/UL (ref 150–400)
PMV BLD AUTO: 9.3 FL (ref 8.9–12.9)
POTASSIUM SERPL-SCNC: 4.6 MMOL/L (ref 3.5–5.1)
RBC # BLD AUTO: 2.94 M/UL (ref 3.8–5.2)
SODIUM SERPL-SCNC: 139 MMOL/L (ref 136–145)
WBC # BLD AUTO: 15 K/UL (ref 3.6–11)

## 2025-07-21 PROCEDURE — 6370000000 HC RX 637 (ALT 250 FOR IP): Performed by: INTERNAL MEDICINE

## 2025-07-21 PROCEDURE — 94761 N-INVAS EAR/PLS OXIMETRY MLT: CPT

## 2025-07-21 PROCEDURE — 6360000002 HC RX W HCPCS: Performed by: STUDENT IN AN ORGANIZED HEALTH CARE EDUCATION/TRAINING PROGRAM

## 2025-07-21 PROCEDURE — 94010 BREATHING CAPACITY TEST: CPT

## 2025-07-21 PROCEDURE — 2500000003 HC RX 250 WO HCPCS: Performed by: STUDENT IN AN ORGANIZED HEALTH CARE EDUCATION/TRAINING PROGRAM

## 2025-07-21 PROCEDURE — 98960 EDU&TRN PT SELF-MGMT NQHP 1: CPT

## 2025-07-21 PROCEDURE — 2700000000 HC OXYGEN THERAPY PER DAY

## 2025-07-21 PROCEDURE — 80048 BASIC METABOLIC PNL TOTAL CA: CPT

## 2025-07-21 PROCEDURE — 94760 N-INVAS EAR/PLS OXIMETRY 1: CPT

## 2025-07-21 PROCEDURE — 85027 COMPLETE CBC AUTOMATED: CPT

## 2025-07-21 PROCEDURE — 6370000000 HC RX 637 (ALT 250 FOR IP): Performed by: STUDENT IN AN ORGANIZED HEALTH CARE EDUCATION/TRAINING PROGRAM

## 2025-07-21 PROCEDURE — 94664 DEMO&/EVAL PT USE INHALER: CPT

## 2025-07-21 PROCEDURE — 36415 COLL VENOUS BLD VENIPUNCTURE: CPT

## 2025-07-21 PROCEDURE — 94640 AIRWAY INHALATION TREATMENT: CPT

## 2025-07-21 RX ORDER — PREDNISONE 20 MG/1
40 TABLET ORAL DAILY
Qty: 4 TABLET | Refills: 0 | Status: SHIPPED | OUTPATIENT
Start: 2025-07-22 | End: 2025-07-24

## 2025-07-21 RX ORDER — HYDRALAZINE HYDROCHLORIDE 20 MG/ML
10 INJECTION INTRAMUSCULAR; INTRAVENOUS EVERY 6 HOURS PRN
Status: DISCONTINUED | OUTPATIENT
Start: 2025-07-21 | End: 2025-07-21 | Stop reason: HOSPADM

## 2025-07-21 RX ADMIN — PREDNISONE 40 MG: 20 TABLET ORAL at 09:18

## 2025-07-21 RX ADMIN — PANTOPRAZOLE SODIUM 40 MG: 40 TABLET, DELAYED RELEASE ORAL at 06:52

## 2025-07-21 RX ADMIN — BUDESONIDE 1000 MCG: 0.5 INHALANT RESPIRATORY (INHALATION) at 08:56

## 2025-07-21 RX ADMIN — SODIUM CHLORIDE, PRESERVATIVE FREE 10 ML: 5 INJECTION INTRAVENOUS at 09:18

## 2025-07-21 RX ADMIN — IPRATROPIUM BROMIDE AND ALBUTEROL SULFATE 1 DOSE: .5; 3 SOLUTION RESPIRATORY (INHALATION) at 09:00

## 2025-07-21 RX ADMIN — ARFORMOTEROL TARTRATE 15 MCG: 15 SOLUTION RESPIRATORY (INHALATION) at 08:57

## 2025-07-21 RX ADMIN — AZITHROMYCIN 250 MG: 250 TABLET, FILM COATED ORAL at 09:18

## 2025-07-21 RX ADMIN — AMLODIPINE BESYLATE 10 MG: 5 TABLET ORAL at 09:18

## 2025-07-21 RX ADMIN — GUAIFENESIN 600 MG: 600 TABLET, EXTENDED RELEASE ORAL at 09:18

## 2025-07-21 ASSESSMENT — COPD QUESTIONNAIRES
QUESTION6_LEAVINGHOUSE: 2
TOTAL_EXACERBATIONS_PASTYEAR: 2
QUESTION5_HOMEACTIVITIES: 0
QUESTION2_CHESTPHLEGM: 0
QUESTION3_CHESTTIGHTNESS: 0
CAT_TOTALSCORE: 10
QUESTION1_COUGHFREQUENCY: 5
QUESTION7_SLEEPQUALITY: 0
QUESTION8_ENERGYLEVEL: 0
QUESTION4_WALKINCLINE: 3

## 2025-07-21 ASSESSMENT — PAIN SCALES - GENERAL: PAINLEVEL_OUTOF10: 0

## 2025-07-21 NOTE — PLAN OF CARE
Problem: Discharge Planning  Goal: Discharge to home or other facility with appropriate resources  Outcome: Progressing  Flowsheets (Taken 7/21/2025 0214)  Discharge to home or other facility with appropriate resources:   Identify barriers to discharge with patient and caregiver   Identify discharge learning needs (meds, wound care, etc)     Problem: Pain  Goal: Verbalizes/displays adequate comfort level or baseline comfort level  Outcome: Progressing  Flowsheets (Taken 7/20/2025 0526)  Verbalizes/displays adequate comfort level or baseline comfort level:   Encourage patient to monitor pain and request assistance   Assess pain using appropriate pain scale   Administer analgesics based on type and severity of pain and evaluate response   Implement non-pharmacological measures as appropriate and evaluate response   Consider cultural and social influences on pain and pain management   Notify Licensed Independent Practitioner if interventions unsuccessful or patient reports new pain     Problem: Respiratory - Adult  Goal: Achieves optimal ventilation and oxygenation  Outcome: Progressing  Flowsheets (Taken 7/20/2025 0526)  Achieves optimal ventilation and oxygenation:   Assess for changes in respiratory status   Encourage broncho-pulmonary hygiene including cough, deep breathe, incentive spirometry   Assess for changes in mentation and behavior   Position to facilitate oxygenation and minimize respiratory effort   Respiratory therapy support as indicated     Problem: Skin/Tissue Integrity - Adult  Goal: Skin integrity remains intact  Outcome: Progressing  Flowsheets (Taken 7/20/2025 0526)  Skin Integrity Remains Intact:   Monitor for areas of redness and/or skin breakdown   Positioning devices  Goal: Oral mucous membranes remain intact  Outcome: Progressing  Flowsheets (Taken 7/20/2025 0526)  Oral Mucous Membranes Remain Intact:   Assess oral mucosa and hygiene practices   Implement preventative oral hygiene regimen

## 2025-07-21 NOTE — CARE COORDINATION
Pt is clear from CM standpoint for d/c.    Pt's family will transport.    Transition of Care Plan:    RUR: 15%  Prior Level of Functioning: Independent   Disposition: Home with home health-[facility]   FRANCISCO: 7/21/25  If SNF or IPR: Date FOC offered:   Date FOC received:   Accepting facility:   Date authorization started with reference number:   Date authorization received and expires:   Follow up appointments: PCP  DME needed: Home oxygen-[facility]   Transportation at discharge: Pt's family   IM/IMM Medicare/ letter given: 7/19/25  Is patient a [place] and connected with [facility]? No   If yes, was  transfer form completed and VA notified? No  Caregiver Contact: Pt's dtr   Discharge Caregiver contacted prior to discharge? Pt was contacted   Care Conference needed? No  Barriers to discharge: None        07/21/25 1208   Services At/After Discharge   Transition of Care Consult (CM Consult) Home Health;DME/Supply Assistance   Services At/After Discharge DME;Home Health   [place] Resource Information Provided? No   Mode of Transport at Discharge Self   Confirm Follow Up Transport Self   Condition of Participation: Discharge Planning   The Plan for Transition of Care is related to the following treatment goals: Home with follow up appointments and home health and DME   The Patient and/or Patient Representative was provided with a Choice of Provider? Patient   The Patient and/Or Patient Representative agree with the Discharge Plan? Yes   Freedom of Choice list was provided with basic dialogue that supports the patient's individualized plan of care/goals, treatment preferences, and shares the quality data associated with the providers?  Yes     Lizzeth Velez    [phone]

## 2025-07-21 NOTE — CARE COORDINATION
Care Management Initial Assessment       RUR: 15%  Readmission? No  1st IM letter given? Yes - 7/19/25  1st  letter given: No      CM introduce self, explain role and confirmed demographics with pt.    Pt lives alone in an one story home with 5 steps to enter using the front door or 1 steps to enter using the back door.    No hx of home health, SNF or inpatient rehab.    Pt uses [facility] on [address].    At the time of d/c pt's family will transport.    CM will follow and assist with d/c planning.       07/21/25 0834   Service Assessment   Patient Orientation Alert and Oriented   Cognition Alert   History Provided By Patient;Medical Record   Primary Caregiver Self   Support Systems Children   Patient's Healthcare Decision Maker is: Legal Next of Kin  (Her children)   PCP Verified by CM Yes   Last Visit to PCP Within last 6 months   Prior Functional Level Independent in ADLs/IADLs   Current Functional Level Independent in ADLs/IADLs   Can patient return to prior living arrangement Yes   Family able to assist with home care needs: Yes   Would you like for me to discuss the discharge plan with any other family members/significant others, and if so, who? Yes  (pt's dtr Radha Wu)   Financial Resources Medicare   Community Resources None   Social/Functional History   Lives With Alone   Type of Home House   Home Equipment Grab bars   Active  Yes   Discharge Planning   Type of Residence House   Current Services Prior To Admission None   Potential Assistance Needed Durable Medical Equipment   Potential DME Needed Oxygen Therapy (Comment)   Patient expects to be discharged to: House     Advance Care Planning     General Advance Care Planning (ACP) Conversation    Date of Conversation: 7/21/2025  Conducted with: Patient with Decision Making Capacity  Other persons present: None    Healthcare Decision Maker:   Primary Decision Maker: BryceRadha - Child - [phone]    Secondary Decision Maker:

## 2025-07-21 NOTE — PLAN OF CARE
Problem: Discharge Planning  Goal: Discharge to home or other facility with appropriate resources  7/21/2025 1119 by Love Rodriguez RN  Outcome: Progressing  7/21/2025 0214 by Yohana Webster RN  Outcome: Progressing  Flowsheets (Taken 7/21/2025 0214)  Discharge to home or other facility with appropriate resources:   Identify barriers to discharge with patient and caregiver   Identify discharge learning needs (meds, wound care, etc)     Problem: Pain  Goal: Verbalizes/displays adequate comfort level or baseline comfort level  7/21/2025 1119 by Love Rodriguez RN  Outcome: Progressing  7/21/2025 0214 by Yohana Webster RN  Outcome: Progressing  Flowsheets (Taken 7/20/2025 0526)  Verbalizes/displays adequate comfort level or baseline comfort level:   Encourage patient to monitor pain and request assistance   Assess pain using appropriate pain scale   Administer analgesics based on type and severity of pain and evaluate response   Implement non-pharmacological measures as appropriate and evaluate response   Consider cultural and social influences on pain and pain management   Notify Licensed Independent Practitioner if interventions unsuccessful or patient reports new pain     Problem: Respiratory - Adult  Goal: Achieves optimal ventilation and oxygenation  7/21/2025 1119 by Love Rodriguez RN  Outcome: Progressing  7/21/2025 0903 by Deysi Conway RCP  Outcome: Progressing  7/21/2025 0214 by Yohana Webster RN  Outcome: Progressing  Flowsheets (Taken 7/20/2025 0526)  Achieves optimal ventilation and oxygenation:   Assess for changes in respiratory status   Encourage broncho-pulmonary hygiene including cough, deep breathe, incentive spirometry   Assess for changes in mentation and behavior   Position to facilitate oxygenation and minimize respiratory effort   Respiratory therapy support as indicated     Problem: Skin/Tissue Integrity - Adult  Goal: Skin integrity remains intact  7/21/2025 1119 by

## 2025-07-21 NOTE — PROGRESS NOTES
Hospitalist Progress Note        Demographics    Patient Name  Katie Wu   Date of Birth 1950   Medical Record Number  [id number]      Age  75 y.o.   PCP Henry Amaro MD   Admit date:  7/18/2025  9:16 PM     Room Number  1118/01  @ [facility]           Admission Diagnoses:  COPD exacerbation (HCC)   Admission Summary:  \" Katie Wu is a 75 y.o.  female with PMHx as listed below presenting to the emergency department with complaints of 1 day progressively worsening shortness of breath associated with nonproductive cough and wheeze similar to prior COPD exacerbations.  Found to be febrile on presentation-reports no prior fevers.  Denies known sick contacts.  Reports no other associated symptoms or complaints.  Former smoker.  Denies alcohol or illicit drugs.  Does not wear oxygen at baseline.     In the ED, patient afebrile and hemodynamically stable saturating mid 80s on room air improved to mid 90s on 2 L supplemental oxygen.  CTA chest demonstrates no acute process.  CXR negative for acute process.  COVID and flu negative.  Labs demonstrate: High-sensitivity troponin 64 => 73, sodium 139, Tessman 4.4, glucose 125, BUN 16, creatinine 1.50 (baseline 1.2), LFTs grossly unremarkable, magnesium 2.3, WBC 14.5, hemoglobin 8.6 (previously 10.9), platelets 283.  Patient given ceftriaxone, azithromycin, Solu-Medrol, DuoNeb, Tylenol by ED provider. \"  -Dr. Monroe 7/19/25      Assessment and plan:      Acute hypoxemic respiratory failure POA  COPD with acute exacerbation  Former smoker  NSTEMI type II, POA  CTA chest - no PE or pneumonia. Emphysema noted  Trop 60-70. Flat. Pt chest pain-free  Admit to telemetry monitoring  Continuous pulse oximetry and supplemental oxygen as needed maintain saturations greater than 90%- now on RA at rest. Failed walking p ox test 7/20- desat to 83%)  5-day prednisone course  5-day azithromycin course  DuoNebs 4 times daily with every 4 hours as 
  Patient determined to be stable for discharge by attending provider. I have reviewed the discharge instructions and follow-up appointments with the patient. They verbalized understanding and all questions were answered to their satisfaction. No complaints or further questions were expressed.       New medications: Appropriate educational materials and medication side effect teaching were provided.       PIV were removed prior to discharge.     All personal items collected during admission were returned to the patient prior to discharge.    Love Rodriguez RN   
Anticoagulation Dosing    Indication:  DVT ppx    Estimated Creatinine Clearance: 27 mL/min (A) (based on SCr of 1.41 mg/dL (H)).    Recent Labs     07/18/25  2145 07/19/25  0308   HGB 8.6* 8.9*   HCT 26.6* 27.4*    274   ALBUMIN 3.1*  --        Wt Readings from Last 1 Encounters:   07/20/25 48.9 kg (107 lb 12.9 oz)     Ht Readings from Last 1 Encounters:   07/19/25 1.6 m (5' 3\")     Body mass index is 19.1 kg/m².    Plan:  Lovenox changed to heparin 5000un sq BID due to patient renal function and weight.       Thank you,  ÁNGEL WADDELL [facility]          
Comprehensive Nutrition Assessment    Type and Reason for Visit:  Initial, Positive nutrition screen    Nutrition Recommendations/Plan:   Continue diet as tolerated  RD to add Glucerna BID  Please document % meals and supplements consumed in flowsheet I/O's under intake      Malnutrition Assessment:  Malnutrition Status:  Insufficient data (07/19/25 1455)    Context:  Acute Illness     Findings of the 6 clinical characteristics of malnutrition:  Energy Intake:  Unable to assess  Weight Loss:  Unable to assess     Body Fat Loss:  Unable to assess     Muscle Mass Loss:  Unable to assess    Fluid Accumulation:  No fluid accumulation     Strength:  Not Performed    Nutrition Assessment:  Pt admitted with COPD exacerbation.  PMH: COPD, GERD, HTN, CKD stage 3.  MST referral received for poor appetite and wt loss.  Chart reviewed, pastoral care in the room at time of attempted visit.  Pt is COVID R/O.  Noted 5.3% wt loss over the past 4 months per EMR however current wt is stated/estimated so question accuracy.  , likely 2' steroids.  Will add PO supplements BID and monitor acceptance.  BMI low for age, suspect with COPD pt will likely meet criteria for malnutrition but will determine this upon F/U when pt can be interviewed and examined.  In the mean time please encourage supplements and PO intake.   Wt Readings from Last 10 Encounters:   07/18/25 48.1 kg (106 lb)   03/13/25 50.9 kg (112 lb 3.2 oz)   02/21/25 49.7 kg (109 lb 9.6 oz)   02/07/25 52.8 kg (116 lb 6.4 oz)   01/31/25 49 kg (108 lb)   01/30/25 48.8 kg (107 lb 9.6 oz)   10/16/24 48.1 kg (106 lb)   03/11/24 49.8 kg (109 lb 12.8 oz)   03/06/24 52.6 kg (116 lb)   02/15/24 51.4 kg (113 lb 4.8 oz)            Nutrition Related Findings:    Meds: azithromycin, protonix, prednisone.    BM: 7/19   Wound Type: None       Current Nutrition Intake & Therapies:    Average Meal Intake: Unable to assess  Average Supplements Intake: None Ordered  ADULT DIET; Regular; 
End of Shift Note    Bedside shift change report given to DEON Aleman (oncoming nurse) by Yohana Webster RN (offgoing nurse).  Report included the following information SBAR and MAR    Shift worked:  2853-4439     Shift summary and any significant changes:     PT slept well and did all task independently. 02 was on all night, she said SOB only when ambulating, but its manageable.      Concerns for physician to address:  None     Zone phone for oncoming shift:   [phone]       Activity:  Level of Assistance: Independent  Number times ambulated in hallways past shift: 0  Number of times OOB to chair past shift: 0    Cardiac:   Cardiac Monitoring: No           Access:  Current line(s): PIV     Genitourinary:        Respiratory:   O2 Device: Nasal cannula  Chronic home O2 use?: NO  Incentive spirometer at bedside: YES    GI:  Last BM (including prior to admit): 07/19/25  Current diet:  ADULT DIET; Regular; Low Fat/Low Chol/High Fiber/ABHISHEK  ADULT ORAL NUTRITION SUPPLEMENT; Breakfast, Dinner; Diabetic Oral Supplement  Passing flatus: YES    Pain Management:   Patient states pain is manageable on current regimen: N/A    Skin:  Ventura Scale Score: 21  Interventions:      Patient Safety:  Fall Risk: Nursing Judgement-Fall Risk High(Add Comments): Yes  Fall Risk Interventions  Nursing Judgement-Fall Risk High(Add Comments): Yes  Toilet Every 2 Hours-In Advance of Need: Yes  Hourly Visual Checks: Awake, In bed  Fall Visual Posted: Fall sign posted, Socks, Armband  Room Door Open: Deferred to promote rest  Alarm On: Bed  Patient Moved Closer to Nursing Station: No    Active Consults:   None    Length of Stay:  Expected LOS: 3  Actual LOS: 2    Yohana Webster RN                           
End of Shift Note    Bedside shift change report given to Yohana CHAVARRIA (oncoming nurse) by Love Rodriguez RN (offgoing nurse).  Report included the following information SBAR, Kardex, and MAR    Shift worked:  6028-6943     Shift summary and any significant changes:     Seen by PA, walking test done, stool occult sent, blood works sent, not for discharge today, explained to the patient and agreed to stay for another day, all needs attended     Concerns for physician to address:  none           Activity:  Level of Assistance: Independent  Number times ambulated in hallways past shift: 2  Number of times OOB to chair past shift: 5    Cardiac:   Cardiac Monitoring: No           Access:  Current line(s): PIV     Genitourinary:        Respiratory:   O2 Device: Nasal cannula  Chronic home O2 use?: NO  Incentive spirometer at bedside: YES    GI:  Last BM (including prior to admit): 07/19/25  Current diet:  ADULT DIET; Regular; Low Fat/Low Chol/High Fiber/ABHISHEK  ADULT ORAL NUTRITION SUPPLEMENT; Breakfast, Dinner; Diabetic Oral Supplement  Passing flatus: YES    Pain Management:   Patient states pain is manageable on current regimen: YES    Skin:  Ventura Scale Score: 20  Interventions:      Patient Safety:  Fall Risk: Nursing Judgement-Fall Risk High(Add Comments): Yes  Fall Risk Interventions  Nursing Judgement-Fall Risk High(Add Comments): Yes  Toilet Every 2 Hours-In Advance of Need: Yes  Hourly Visual Checks: In bed, Awake  Fall Visual Posted: Socks  Room Door Open: Yes  Alarm On: Bed  Patient Moved Closer to Nursing Station: No    Active Consults:   None    Length of Stay:  Expected LOS: 2  Actual LOS: 1    Love Rodriguez RN                           
End of Shift Note    Bedside shift change report given to Yohana CHAVARRIA (oncoming nurse) by Love Rodriguez RN (offgoing nurse).  Report included the following information SBAR, Kardex, and MAR    Shift worked:  7095-3791     Shift summary and any significant changes:     Respiratory panel sent, seen by PA,transferred room due to room availability in the front hallway as ordered by bed placement, all needs attended     Concerns for physician to address:  none           Activity:  Level of Assistance: Independent  Number times ambulated in hallways past shift: 1  Number of times OOB to chair past shift: 5    Cardiac:   Cardiac Monitoring: No           Access:  Current line(s): PIV     Genitourinary:        Respiratory:   O2 Device: Nasal cannula  Chronic home O2 use?: YES  Incentive spirometer at bedside: NO    GI:  Last BM (including prior to admit): 07/19/25  Current diet:  ADULT DIET; Regular; Low Fat/Low Chol/High Fiber/ABHISHEK  ADULT ORAL NUTRITION SUPPLEMENT; Breakfast, Dinner; Diabetic Oral Supplement  Passing flatus: YES    Pain Management:   Patient states pain is manageable on current regimen: YES    Skin:  Ventura Scale Score: 22  Interventions:      Patient Safety:  Fall Risk: Nursing Judgement-Fall Risk High(Add Comments): Yes  Fall Risk Interventions  Nursing Judgement-Fall Risk High(Add Comments): Yes  Toilet Every 2 Hours-In Advance of Need: Yes  Hourly Visual Checks: In bed  Fall Visual Posted: Socks  Room Door Open: Yes  Alarm On: Bed  Patient Moved Closer to Nursing Station: No    Active Consults:   None    Length of Stay:  Expected LOS: 2  Actual LOS: 0    Love Rodriguez RN                           
Hospital follow-up PCP transitional care appointment has been scheduled with Dr. Henry Amaro on 8/1/2025 at 1315. This is the first available appt due to limited provider availability. PCP office does not offer alternate provider option for hospital follow up. Pending patient discharge.     
Pulmonary Disease Navigator Note  [facility]    Current GOLD classification for Katie Wu                                         Review of Patient's Chart by Pulmonary Disease Navigator for Compliance with GOLD Guidelines         Please review the following recommendations for both pharmacological and non-pharmacological treatment for a patient with obstructive lung disease, in accordance with the Global Initiative for Chronic Obstructive Lung Disease (GOLD) standards.    Current Pharmacological Treatment:     albuterol sulfate HFA (VENTOLIN HFA) 108 (90 Base) MCG/ACT inhaler   TRELEGY ELLIPTA 100-62.5-25 MCG/ACT AEPB inhaler         Spirometry Assessment     GOLD Stage:  Requires PFT documentation  Group:    Current eosinophil count: 0.01  Recorded domestic exacerbations past 12 months: 2                Observed PIF      (Incheck Dial:  MDI 120lpm; DPI 70lpm)      Combination  ICS-LABA Inhaler Acceptable   Therapy  Device For Use   Salmeterol/fluticasone Advair  Diskus Yes   Vilanterol/fluticasone  Breo  Ellipta Yes   Formoterol/mometasone  Dulera MDI Yes   Formoterol/budesonide  Symbicort MDI Yes   Triple Therapy Recommended (For Group E) [id number]     Fluticasone/umeclidinium/vilanterol  Trelegy  Ellipta Yes   Budesonide/glycopyrrolate/formoterol fumarate  Breztri  MDI Yes   Recommended (For Group A & B) LAMA/LABA     Vilanterol/umeclidinium  Anoro  Ellipta Yes   Olodaterol/tiotropium  Stiolto  Respimat Yes   Formoterol/glycopyrronium  Bevespi  MDI Yes   Aclidinium/formoterol Duaklir  Pressair      *Nebulizer Options    LAMA LABA ICS   Adela Guevara Budesonide    Performist        The CAT provides a reliable measure of the impact of COPD on a patient's health status. Range of CAT scores from 0-40. Higher scores denote a more severe impact of COPD on a patient’s life. Scores <10 have a low impact, 10-20 medium, 21-30 high and >30 very high impact, requiring gradually 
Pulse oximetry assessment   92% at rest on room air (if 88% or less, skip next steps)  83% while ambulating on room air    
Pulse oximetry assessment   92% at rest on room air (if 88% or less, skip next steps)  83% while ambulating on room air  93% at rest on 2LPM  89% while ambulating on 2LPM     
Spiritual Health History and Assessment/Progress Note  [facility]    Loneliness/Social Isolation,  ,  ,      Name: Katie Wu MRN: [id number]    Age: 75 y.o.     Sex: female   Language: English   Jew: Baptism   COPD exacerbation (HCC)     Date: 2025            Total Time Calculated: 10 min              Spiritual Assessment began in [facility] MULTI-SPECIALTY TELEMETRY        Referral/Consult From: Nurse   Encounter Overview/Reason: Loneliness/Social Isolation  Service Provided For: Family    Aleida, Belief, Meaning:   Patient unable to assess at this time  Family/Friends identify as spiritual, are connected with a aleida tradition or spiritual practice, and have beliefs or practices that help with coping during difficult times      Importance and Influence:  Patient unable to assess at this time  Family/Friends Other: Not discussed at this time    Community:  Patient feels well-supported. Support system includes: Children and Extended family  Family/Friends feel well-supported. Support system includes: Children and Extended family    Assessment and Plan of Care:     Patient Interventions include: Other: Did not speak with pt. Many family members visiting patient after they found out of patient's hospitalization while at a fellow family members   Family/Friends Interventions include: Affirmed coping skills/support systems    Patient Plan of Care: Spiritual Care available upon further referral  Family/Friends Plan of Care: Spiritual Care available upon further referral    Electronically signed by Chaplain Naseem on 2025 at 2:45 PM  
Daily   • sodium chloride flush  5-40 mL IntraVENous 2 times per day   • enoxaparin  30 mg SubCUTAneous Daily   • ipratropium 0.5 mg-albuterol 2.5 mg  1 Dose Inhalation TID RT     Continuous Infusions:  • sodium chloride       PRN Meds:.ipratropium 0.5 mg-albuterol 2.5 mg, sodium chloride flush, sodium chloride, ondansetron **OR** ondansetron, polyethylene glycol, acetaminophen **OR** acetaminophen, melatonin       Relevant other information:   Results       Procedure Component Value Units Date/Time    Respiratory Panel, Molecular, with COVID-19 (Restricted: peds pts or suitable admitted adults) [[id number]] Collected: 07/19/25 0919    Order Status: Sent Specimen: Nasopharyngeal Updated: 07/19/25 0955    Blood Culture 2 [[id number]] Collected: 07/18/25 2156    Order Status: Completed Specimen: Blood Updated: 07/19/25 0940     Special Requests --        RIGHT  Antecubital       Culture NO GROWTH AFTER 11 HOURS       COVID-19 & Influenza Combo [[id number]] Collected: 07/18/25 2145    Order Status: Completed Specimen: Nasopharyngeal Updated: 07/18/25 2235     Source Nasopharyngeal        SARS-CoV-2, PCR Not detected        Comment: Not Detected results do not preclude SARS-CoV-2 infection and should not be used as the sole basis for patient management decisions.Results must be combined with clinical observations, patient history, and epidemiological information.        Rapid Influenza A By PCR Not detected        Rapid Influenza B By PCR Not detected        Comment:    Testing was performed using joana Jyothi SARS-CoV-2 and Influenza A/B nucleic acid assay.  This test is a multiplex Real-Time Reverse Transcriptase Polymerase Chain Reaction (RT-PCR) based in vitro diagnostic test intended for the qualitative detection of nucleic acids from SARS-CoV-2, Influenza A, and Influenza B in nasopharyngeal specimens.         Blood Culture 1 [[id number]] Collected: 07/18/25 2142    Order Status: Completed Specimen: Blood Updated:

## 2025-07-21 NOTE — DISCHARGE SUMMARY
Discharge Summary    Name: Katie Wu  [id number]  YOB: 1950 (Age: 75 y.o.)   Date of Admission: 7/18/2025  Date of Discharge: 7/21/2025  Attending Physician: Karina May MD    Discharge Diagnosis:     Acute hypoxemic respiratory failure POA  COPD with acute exacerbation  Former smoker  NSTEMI type II, POA  Acute on chronic normocytic anemia       Consultations:  IP CONSULT TO CASE MANAGEMENT  IP CONSULT TO CASE MANAGEMENT      Brief Admission History/Reason for Admission Per Rishi Monroe, DO:     Katie Wu is a 75 y.o.  female with PMHx as listed below presenting to the emergency department with complaints of 1 day progressively worsening shortness of breath associated with nonproductive cough and wheeze similar to prior COPD exacerbations.  Found to be febrile on presentation-reports no prior fevers.  Denies known sick contacts.  Reports no other associated symptoms or complaints.  Former smoker.  Denies alcohol or illicit drugs.  Does not wear oxygen at baseline.     In the ED, patient afebrile and hemodynamically stable saturating mid 80s on room air improved to mid 90s on 2 L supplemental oxygen.  CTA chest demonstrates no acute process.  CXR negative for acute process.  COVID and flu negative.  Labs demonstrate: High-sensitivity troponin 64 => 73, sodium 139, Tessman 4.4, glucose 125, BUN 16, creatinine 1.50 (baseline 1.2), LFTs grossly unremarkable, magnesium 2.3, WBC 14.5, hemoglobin 8.6 (previously 10.9), platelets 283.  Patient given ceftriaxone, azithromycin, Solu-Medrol, DuoNeb, Tylenol by ED provider.     We were asked to admit for work up and evaluation of the below  problems.         Brief Hospital Course by Main Problems:     Acute hypoxemic respiratory failure POA  COPD with acute exacerbation  Former smoker  NSTEMI type II, POA  CTA chest - no PE or pneumonia. Emphysema noted  Trop 60-70. Flat. Pt chest pain-free  Trop 67/73

## 2025-07-21 NOTE — PLAN OF CARE
Problem: Discharge Planning  Goal: Discharge to home or other facility with appropriate resources  7/21/2025 1310 by Love Rodriguez RN  Outcome: Adequate for Discharge  7/21/2025 1119 by Love Rodriguez RN  Outcome: Progressing  7/21/2025 0214 by Yohana Webster RN  Outcome: Progressing  Flowsheets (Taken 7/21/2025 0214)  Discharge to home or other facility with appropriate resources:   Identify barriers to discharge with patient and caregiver   Identify discharge learning needs (meds, wound care, etc)     Problem: Pain  Goal: Verbalizes/displays adequate comfort level or baseline comfort level  7/21/2025 1310 by Love Rodriguez RN  Outcome: Adequate for Discharge  7/21/2025 1119 by Love Rodriguez RN  Outcome: Progressing  7/21/2025 0214 by Yohana Webster RN  Outcome: Progressing  Flowsheets (Taken 7/20/2025 0526)  Verbalizes/displays adequate comfort level or baseline comfort level:   Encourage patient to monitor pain and request assistance   Assess pain using appropriate pain scale   Administer analgesics based on type and severity of pain and evaluate response   Implement non-pharmacological measures as appropriate and evaluate response   Consider cultural and social influences on pain and pain management   Notify Licensed Independent Practitioner if interventions unsuccessful or patient reports new pain     Problem: Respiratory - Adult  Goal: Achieves optimal ventilation and oxygenation  7/21/2025 1310 by Love Rodriguez RN  Outcome: Adequate for Discharge  7/21/2025 1119 by Love Rodriguez RN  Outcome: Progressing  7/21/2025 0903 by Deysi Conway RCP  Outcome: Progressing  7/21/2025 0214 by Yohana Webster RN  Outcome: Progressing  Flowsheets (Taken 7/20/2025 0526)  Achieves optimal ventilation and oxygenation:   Assess for changes in respiratory status   Encourage broncho-pulmonary hygiene including cough, deep breathe, incentive spirometry   Assess for changes in

## 2025-07-23 ENCOUNTER — TELEPHONE (OUTPATIENT)
Facility: CLINIC | Age: 75
End: 2025-07-23

## 2025-07-23 NOTE — TELEPHONE ENCOUNTER
Care Transitions Initial Follow Up Call    Outreach made within 2 business days of discharge: Yes    Patient: Katie Wu Patient : 1950   MRN: [id number]  Reason for Admission: COPD  Discharge Date: 25       Spoke with: Patient     Discharge department/facility: [facility] Interactive Patient Contact:  Was patient able to fill all prescriptions: Yes  Was patient instructed to bring all medications to the follow-up visit: Yes  Is patient taking all medications as directed in the discharge summary? Yes  Does patient understand their discharge instructions: Yes  Does patient have questions or concerns that need addressed prior to 7-14 day follow up office visit: no    Additional needs identified to be addressed with provider  No needs identified             Scheduled appointment with PCP within 7-14 days    Follow Up  Future Appointments   Date Time Provider Department Center   2025  1:15 PM Henry Amaro MD [facility] DEP       Penny Garrison LPN

## 2025-07-27 LAB — VIT B12 SERPL-MCNC: 306 PG/ML (ref 232–1245)

## 2025-07-28 LAB — VIT B12 SERPL-MCNC: 306 PG/ML (ref 232–1245)

## 2025-07-29 ENCOUNTER — OFFICE VISIT (OUTPATIENT)
Facility: CLINIC | Age: 75
End: 2025-07-29

## 2025-07-29 VITALS
BODY MASS INDEX: 19.56 KG/M2 | DIASTOLIC BLOOD PRESSURE: 71 MMHG | HEART RATE: 81 BPM | RESPIRATION RATE: 16 BRPM | SYSTOLIC BLOOD PRESSURE: 137 MMHG | OXYGEN SATURATION: 95 % | HEIGHT: 63 IN | TEMPERATURE: 97.8 F | WEIGHT: 110.4 LBS

## 2025-07-29 DIAGNOSIS — J96.11 CHRONIC RESPIRATORY FAILURE WITH HYPOXIA (HCC): ICD-10-CM

## 2025-07-29 DIAGNOSIS — E78.5 DYSLIPIDEMIA: ICD-10-CM

## 2025-07-29 DIAGNOSIS — J45.20 MILD INTERMITTENT REACTIVE AIRWAY DISEASE WITHOUT COMPLICATION: ICD-10-CM

## 2025-07-29 DIAGNOSIS — J43.1 PANLOBULAR EMPHYSEMA (HCC): Primary | ICD-10-CM

## 2025-07-29 DIAGNOSIS — I10 ESSENTIAL HYPERTENSION: ICD-10-CM

## 2025-07-29 DIAGNOSIS — R79.89 PRERENAL AZOTEMIA: ICD-10-CM

## 2025-07-29 DIAGNOSIS — Z09 HOSPITAL DISCHARGE FOLLOW-UP: ICD-10-CM

## 2025-07-29 SDOH — ECONOMIC STABILITY: FOOD INSECURITY: WITHIN THE PAST 12 MONTHS, THE FOOD YOU BOUGHT JUST DIDN'T LAST AND YOU DIDN'T HAVE MONEY TO GET MORE.: NEVER TRUE

## 2025-07-29 SDOH — ECONOMIC STABILITY: FOOD INSECURITY: WITHIN THE PAST 12 MONTHS, YOU WORRIED THAT YOUR FOOD WOULD RUN OUT BEFORE YOU GOT MONEY TO BUY MORE.: NEVER TRUE

## 2025-07-29 ASSESSMENT — PATIENT HEALTH QUESTIONNAIRE - PHQ9
SUM OF ALL RESPONSES TO PHQ QUESTIONS 1-9: 0
SUM OF ALL RESPONSES TO PHQ QUESTIONS 1-9: 0
2. FEELING DOWN, DEPRESSED OR HOPELESS: NOT AT ALL
1. LITTLE INTEREST OR PLEASURE IN DOING THINGS: NOT AT ALL
SUM OF ALL RESPONSES TO PHQ QUESTIONS 1-9: 0
SUM OF ALL RESPONSES TO PHQ QUESTIONS 1-9: 0

## 2025-07-31 LAB
EKG ATRIAL RATE: 86 BPM
EKG ATRIAL RATE: 87 BPM
EKG DIAGNOSIS: NORMAL
EKG DIAGNOSIS: NORMAL
EKG P AXIS: 87 DEGREES
EKG P AXIS: 88 DEGREES
EKG P-R INTERVAL: 144 MS
EKG P-R INTERVAL: 146 MS
EKG Q-T INTERVAL: 330 MS
EKG Q-T INTERVAL: 334 MS
EKG QRS DURATION: 60 MS
EKG QRS DURATION: 64 MS
EKG QTC CALCULATION (BAZETT): 397 MS
EKG QTC CALCULATION (BAZETT): 399 MS
EKG R AXIS: 91 DEGREES
EKG R AXIS: 92 DEGREES
EKG T AXIS: 40 DEGREES
EKG T AXIS: 60 DEGREES
EKG VENTRICULAR RATE: 86 BPM
EKG VENTRICULAR RATE: 87 BPM

## 2025-08-04 RX ORDER — ALBUTEROL SULFATE 90 UG/1
2 INHALANT RESPIRATORY (INHALATION) 4 TIMES DAILY PRN
Qty: 54 G | Refills: 3 | Status: SHIPPED | OUTPATIENT
Start: 2025-08-04

## 2025-08-10 PROBLEM — J96.11 CHRONIC RESPIRATORY FAILURE WITH HYPOXIA (HCC): Status: ACTIVE | Noted: 2025-08-10

## 2025-08-10 PROBLEM — E78.5 DYSLIPIDEMIA: Status: ACTIVE | Noted: 2025-08-10

## 2025-08-10 RX ORDER — ROSUVASTATIN CALCIUM 10 MG/1
10 TABLET, COATED ORAL DAILY
Qty: 30 TABLET | Refills: 11 | Status: SHIPPED | OUTPATIENT
Start: 2025-08-10

## 2025-08-28 RX ORDER — AMLODIPINE BESYLATE 10 MG/1
10 TABLET ORAL DAILY
Qty: 90 TABLET | Refills: 3 | Status: SHIPPED | OUTPATIENT
Start: 2025-08-28

## 2025-08-28 RX ORDER — FLUTICASONE FUROATE, UMECLIDINIUM BROMIDE AND VILANTEROL TRIFENATATE 100; 62.5; 25 UG/1; UG/1; UG/1
POWDER RESPIRATORY (INHALATION)
Qty: 3 EACH | Refills: 3 | Status: SHIPPED | OUTPATIENT
Start: 2025-08-28